# Patient Record
Sex: MALE | URBAN - METROPOLITAN AREA
[De-identification: names, ages, dates, MRNs, and addresses within clinical notes are randomized per-mention and may not be internally consistent; named-entity substitution may affect disease eponyms.]

---

## 2017-09-14 ENCOUNTER — HOSPITAL ENCOUNTER (OUTPATIENT)
Dept: SURGERY | Age: 72
Discharge: HOME | End: 2017-09-14
Attending: OPHTHALMOLOGY | Admitting: OPHTHALMOLOGY

## 2017-09-14 VITALS
WEIGHT: 225 LBS | RESPIRATION RATE: 12 BRPM | DIASTOLIC BLOOD PRESSURE: 69 MMHG | SYSTOLIC BLOOD PRESSURE: 147 MMHG | TEMPERATURE: 97.4 F | HEIGHT: 73 IN | HEART RATE: 63 BPM | OXYGEN SATURATION: 94 % | BODY MASS INDEX: 29.82 KG/M2

## 2017-09-14 RX ORDER — SODIUM CHLORIDE 0.9 % (FLUSH) 0.9 %
10 SYRINGE (ML) INJECTION EVERY 12 HOURS SCHEDULED
Status: DISCONTINUED | OUTPATIENT
Start: 2017-09-14 | End: 2017-09-15 | Stop reason: HOSPADM

## 2017-09-14 RX ORDER — SODIUM CHLORIDE 0.9 % (FLUSH) 0.9 %
10 SYRINGE (ML) INJECTION PRN
Status: DISCONTINUED | OUTPATIENT
Start: 2017-09-14 | End: 2017-09-14 | Stop reason: SDUPTHER

## 2017-09-14 RX ORDER — SODIUM CHLORIDE 0.9 % (FLUSH) 0.9 %
10 SYRINGE (ML) INJECTION PRN
Status: DISCONTINUED | OUTPATIENT
Start: 2017-09-14 | End: 2017-09-15 | Stop reason: HOSPADM

## 2017-09-14 RX ORDER — FENTANYL CITRATE 50 UG/ML
25 INJECTION, SOLUTION INTRAMUSCULAR; INTRAVENOUS EVERY 5 MIN PRN
Status: DISCONTINUED | OUTPATIENT
Start: 2017-09-14 | End: 2017-09-15 | Stop reason: HOSPADM

## 2017-09-14 RX ORDER — SODIUM CHLORIDE 9 MG/ML
INJECTION, SOLUTION INTRAVENOUS CONTINUOUS
Status: DISCONTINUED | OUTPATIENT
Start: 2017-09-14 | End: 2017-09-15 | Stop reason: HOSPADM

## 2017-09-14 RX ORDER — ONDANSETRON 2 MG/ML
4 INJECTION INTRAMUSCULAR; INTRAVENOUS
Status: ACTIVE | OUTPATIENT
Start: 2017-09-14 | End: 2017-09-14

## 2017-09-14 RX ORDER — SODIUM CHLORIDE 0.9 % (FLUSH) 0.9 %
10 SYRINGE (ML) INJECTION EVERY 12 HOURS SCHEDULED
Status: DISCONTINUED | OUTPATIENT
Start: 2017-09-14 | End: 2017-09-14 | Stop reason: SDUPTHER

## 2017-09-14 RX ADMIN — SODIUM CHLORIDE: 9 INJECTION, SOLUTION INTRAVENOUS at 08:01

## 2017-09-14 ASSESSMENT — PAIN - FUNCTIONAL ASSESSMENT: PAIN_FUNCTIONAL_ASSESSMENT: 0-10

## 2017-09-14 ASSESSMENT — PAIN SCALES - GENERAL
PAINLEVEL_OUTOF10: 0
PAINLEVEL_OUTOF10: 0

## 2017-09-14 ASSESSMENT — ENCOUNTER SYMPTOMS: SHORTNESS OF BREATH: 0

## 2024-01-06 ENCOUNTER — APPOINTMENT (OUTPATIENT)
Dept: GENERAL RADIOLOGY | Age: 79
End: 2024-01-06
Payer: MEDICARE

## 2024-01-06 ENCOUNTER — APPOINTMENT (OUTPATIENT)
Dept: CT IMAGING | Age: 79
End: 2024-01-06
Payer: MEDICARE

## 2024-01-06 ENCOUNTER — HOSPITAL ENCOUNTER (EMERGENCY)
Age: 79
Discharge: ANOTHER HOSPITAL | End: 2024-01-06
Attending: EMERGENCY MEDICINE
Payer: MEDICARE

## 2024-01-06 VITALS
RESPIRATION RATE: 16 BRPM | TEMPERATURE: 97.8 F | HEART RATE: 71 BPM | OXYGEN SATURATION: 94 % | HEIGHT: 73 IN | SYSTOLIC BLOOD PRESSURE: 152 MMHG | WEIGHT: 192 LBS | DIASTOLIC BLOOD PRESSURE: 72 MMHG | BODY MASS INDEX: 25.45 KG/M2

## 2024-01-06 DIAGNOSIS — S06.5XAA SUBDURAL HEMATOMA (HCC): ICD-10-CM

## 2024-01-06 DIAGNOSIS — I60.9 SAH (SUBARACHNOID HEMORRHAGE) (HCC): Primary | ICD-10-CM

## 2024-01-06 LAB
ALBUMIN SERPL-MCNC: 3.7 G/DL (ref 3.4–5)
ALBUMIN/GLOB SERPL: 1.3 {RATIO} (ref 1.1–2.2)
ALP SERPL-CCNC: 175 U/L (ref 40–129)
ALT SERPL-CCNC: 25 U/L (ref 10–40)
ANION GAP SERPL CALCULATED.3IONS-SCNC: 11 MMOL/L (ref 3–16)
AST SERPL-CCNC: 26 U/L (ref 15–37)
BASOPHILS # BLD: 0.1 K/UL (ref 0–0.2)
BASOPHILS NFR BLD: 0.6 %
BILIRUB SERPL-MCNC: 0.4 MG/DL (ref 0–1)
BUN SERPL-MCNC: 23 MG/DL (ref 7–20)
CALCIUM SERPL-MCNC: 9 MG/DL (ref 8.3–10.6)
CHLORIDE SERPL-SCNC: 95 MMOL/L (ref 99–110)
CO2 SERPL-SCNC: 28 MMOL/L (ref 21–32)
CREAT SERPL-MCNC: 0.9 MG/DL (ref 0.8–1.3)
DEPRECATED RDW RBC AUTO: 13.7 % (ref 12.4–15.4)
EOSINOPHIL # BLD: 0.1 K/UL (ref 0–0.6)
EOSINOPHIL NFR BLD: 1.4 %
GFR SERPLBLD CREATININE-BSD FMLA CKD-EPI: >60 ML/MIN/{1.73_M2}
GLUCOSE SERPL-MCNC: 115 MG/DL (ref 70–99)
HCT VFR BLD AUTO: 37 % (ref 40.5–52.5)
HGB BLD-MCNC: 12.7 G/DL (ref 13.5–17.5)
INR PPP: 0.99 (ref 0.84–1.16)
LYMPHOCYTES # BLD: 1.3 K/UL (ref 1–5.1)
LYMPHOCYTES NFR BLD: 16.3 %
MCH RBC QN AUTO: 31.7 PG (ref 26–34)
MCHC RBC AUTO-ENTMCNC: 34.2 G/DL (ref 31–36)
MCV RBC AUTO: 92.6 FL (ref 80–100)
MONOCYTES # BLD: 0.7 K/UL (ref 0–1.3)
MONOCYTES NFR BLD: 8.4 %
NEUTROPHILS # BLD: 6 K/UL (ref 1.7–7.7)
NEUTROPHILS NFR BLD: 73.3 %
PLATELET # BLD AUTO: 197 K/UL (ref 135–450)
PMV BLD AUTO: 8.1 FL (ref 5–10.5)
POTASSIUM SERPL-SCNC: 3.7 MMOL/L (ref 3.5–5.1)
PROT SERPL-MCNC: 6.6 G/DL (ref 6.4–8.2)
PROTHROMBIN TIME: 13.1 SEC (ref 11.5–14.8)
RBC # BLD AUTO: 4 M/UL (ref 4.2–5.9)
SODIUM SERPL-SCNC: 134 MMOL/L (ref 136–145)
WBC # BLD AUTO: 8.1 K/UL (ref 4–11)

## 2024-01-06 PROCEDURE — 70450 CT HEAD/BRAIN W/O DYE: CPT

## 2024-01-06 PROCEDURE — 80053 COMPREHEN METABOLIC PANEL: CPT

## 2024-01-06 PROCEDURE — 73090 X-RAY EXAM OF FOREARM: CPT

## 2024-01-06 PROCEDURE — 85610 PROTHROMBIN TIME: CPT

## 2024-01-06 PROCEDURE — 73502 X-RAY EXAM HIP UNI 2-3 VIEWS: CPT

## 2024-01-06 PROCEDURE — 73060 X-RAY EXAM OF HUMERUS: CPT

## 2024-01-06 PROCEDURE — 85025 COMPLETE CBC W/AUTO DIFF WBC: CPT

## 2024-01-06 PROCEDURE — 72125 CT NECK SPINE W/O DYE: CPT

## 2024-01-06 PROCEDURE — 99285 EMERGENCY DEPT VISIT HI MDM: CPT

## 2024-01-06 RX ORDER — ACETAMINOPHEN 325 MG/1
650 TABLET ORAL ONCE
Status: DISCONTINUED | OUTPATIENT
Start: 2024-01-06 | End: 2024-01-07 | Stop reason: HOSPADM

## 2024-01-06 ASSESSMENT — PAIN SCALES - GENERAL: PAINLEVEL_OUTOF10: 3

## 2024-01-06 ASSESSMENT — PAIN - FUNCTIONAL ASSESSMENT
PAIN_FUNCTIONAL_ASSESSMENT: PREVENTS OR INTERFERES WITH ALL ACTIVE AND SOME PASSIVE ACTIVITIES
PAIN_FUNCTIONAL_ASSESSMENT: 0-10

## 2024-01-06 ASSESSMENT — PAIN DESCRIPTION - LOCATION: LOCATION: SHOULDER

## 2024-01-06 ASSESSMENT — LIFESTYLE VARIABLES
HOW MANY STANDARD DRINKS CONTAINING ALCOHOL DO YOU HAVE ON A TYPICAL DAY: PATIENT DOES NOT DRINK
HOW OFTEN DO YOU HAVE A DRINK CONTAINING ALCOHOL: NEVER

## 2024-01-06 ASSESSMENT — PAIN DESCRIPTION - ORIENTATION: ORIENTATION: RIGHT;LEFT

## 2024-01-06 ASSESSMENT — PAIN DESCRIPTION - PAIN TYPE: TYPE: ACUTE PAIN
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
## 2024-01-07 NOTE — ED NOTES
ED SBAR report called to [facility] for ED to ED transport for neurosurgery. All questions answered at this time.

## 2024-01-07 NOTE — ED PROVIDER NOTES
rebound.  Pelvis stable and nontender.  Musculoskeletal: Flaccid right upper extremity hemiparesis.  No tenderness in the right upper extremity.  He does have full passive range of motion.  There is mild discomfort with range of motion in both shoulders but states that he does have some chronic pain in the shoulders.  No associated chest pain.  All other extremities non-tender with full range of motion.  Radial and dorsalis pedis pulses were equal laterally.  No calf tenderness erythema or edema.  Neurological: Alert and oriented x 3.  GCS 15.  No dysarthria.  No aphasia.  Left arm reveals no drift.  Able lift both lower extremities off the bed without difficulty.  Flaccid right upper extremity hemiparesis.  Patient speech clear. No acute focal motor or sensory deficits.  Skin: Skin is warm and dry. No rash.   Psychiatric: Normal mood and affect. Behavior is normal.     DIAGNOSTIC RESULTS     EKG: All EKG's are interpreted by the Emergency Department Physician who either signs or Co-signs this chart in the absence of a cardiologist.        RADIOLOGY:   Non-plain film images such as CT, Ultrasound and MRI are read by the radiologist. Plain radiographic images are visualized and preliminarily interpreted by the emergency physician with the below findings:        Interpretation per the Radiologist below, if available at the time of this note:    XR RADIUS ULNA RIGHT (2 VIEWS)   Final Result   1. No acute fracture or dislocation of the bilateral humeri or forearms are   identified.   2. Chronic fracture deformity of the proximal right humeral diaphysis.   3. 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         XR RADIUS ULNA LEFT (2 VIEWS)   Final Result   1. No acute fracture or dislocation of the bilateral humeri or forearms are   identified.   2. Chronic fracture deformity of the proximal right humeral diaphysis.   3. 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         XR HUMERUS RIGHT (MIN 2 VIEWS)   Final

## 2024-01-07 NOTE — ED NOTES
Patient has had trending SaO2 around 90% on RA.  Patient placed on O2 via NC at 2 lpm at this time.

## 2024-01-07 NOTE — ED NOTES
Patient transferred to  for Neurosurgery services not provided at this hospital.  Report to EMS crew who accepts care of patient for transport.  All questions answered and no concerns at this time.

## 2024-01-07 NOTE — ED NOTES
Shift handoff report given to  Adrian HERNANDEZ . VSS and call light within reach. All questions answered at this time.

## 2024-01-26 ENCOUNTER — HOSPITAL ENCOUNTER (INPATIENT)
Age: 79
DRG: 949 | End: 2024-01-26
Attending: PHYSICAL MEDICINE & REHABILITATION | Admitting: PHYSICAL MEDICINE & REHABILITATION
Payer: MEDICARE

## 2024-01-26 PROBLEM — I60.9 SUBARACHNOID HEMORRHAGE (HCC): Status: ACTIVE | Noted: 2024-01-26

## 2024-01-26 PROCEDURE — 94760 N-INVAS EAR/PLS OXIMETRY 1: CPT

## 2024-01-26 PROCEDURE — 51798 US URINE CAPACITY MEASURE: CPT

## 2024-01-26 PROCEDURE — 6370000000 HC RX 637 (ALT 250 FOR IP): Performed by: PHYSICAL MEDICINE & REHABILITATION

## 2024-01-26 PROCEDURE — 6360000002 HC RX W HCPCS: Performed by: PHYSICAL MEDICINE & REHABILITATION

## 2024-01-26 PROCEDURE — 1280000000 HC REHAB R&B

## 2024-01-26 PROCEDURE — 51701 INSERT BLADDER CATHETER: CPT

## 2024-01-26 RX ORDER — ATORVASTATIN CALCIUM 10 MG/1
10 TABLET, FILM COATED ORAL NIGHTLY
Status: DISPENSED | OUTPATIENT
Start: 2024-01-27

## 2024-01-26 RX ORDER — PHENYTOIN SODIUM 100 MG/1
100 CAPSULE, EXTENDED RELEASE ORAL 3 TIMES DAILY
Status: DISPENSED | OUTPATIENT
Start: 2024-01-26

## 2024-01-26 RX ORDER — ENOXAPARIN SODIUM 100 MG/ML
80 INJECTION SUBCUTANEOUS 2 TIMES DAILY
Status: DISPENSED | OUTPATIENT
Start: 2024-01-26

## 2024-01-26 RX ORDER — CARVEDILOL 6.25 MG/1
6.25 TABLET ORAL 2 TIMES DAILY WITH MEALS
Status: DISCONTINUED | OUTPATIENT
Start: 2024-01-26 | End: 2024-01-31

## 2024-01-26 RX ORDER — PANTOPRAZOLE SODIUM 40 MG/1
40 TABLET, DELAYED RELEASE ORAL
Status: DISCONTINUED | OUTPATIENT
Start: 2024-01-26 | End: 2024-02-01

## 2024-01-26 RX ORDER — OXYCODONE HCL 10 MG/1
10 TABLET, FILM COATED, EXTENDED RELEASE ORAL EVERY 12 HOURS SCHEDULED
Status: DISPENSED | OUTPATIENT
Start: 2024-01-26

## 2024-01-26 RX ORDER — BACLOFEN 10 MG/1
10 TABLET ORAL 3 TIMES DAILY
Status: DISCONTINUED | OUTPATIENT
Start: 2024-01-26 | End: 2024-01-28

## 2024-01-26 RX ORDER — OXYCODONE HYDROCHLORIDE 5 MG/1
5 TABLET ORAL EVERY 8 HOURS PRN
Status: DISCONTINUED | OUTPATIENT
Start: 2024-01-26 | End: 2024-01-30

## 2024-01-26 RX ORDER — BISACODYL 10 MG
10 SUPPOSITORY, RECTAL RECTAL DAILY PRN
Status: DISPENSED | OUTPATIENT
Start: 2024-01-26

## 2024-01-26 RX ORDER — ONDANSETRON 4 MG/1
4 TABLET, FILM COATED ORAL EVERY 8 HOURS PRN
Status: DISPENSED | OUTPATIENT
Start: 2024-01-26

## 2024-01-26 RX ORDER — LAMOTRIGINE 100 MG/1
200 TABLET ORAL 2 TIMES DAILY
Status: DISPENSED | OUTPATIENT
Start: 2024-01-26

## 2024-01-26 RX ORDER — HYDRALAZINE HYDROCHLORIDE 10 MG/1
10 TABLET, FILM COATED ORAL EVERY 6 HOURS PRN
Status: ACTIVE | OUTPATIENT
Start: 2024-01-26

## 2024-01-26 RX ORDER — LANOLIN ALCOHOL/MO/W.PET/CERES
3 CREAM (GRAM) TOPICAL NIGHTLY PRN
Status: DISPENSED | OUTPATIENT
Start: 2024-01-26

## 2024-01-26 RX ORDER — ACETAMINOPHEN 325 MG/1
650 TABLET ORAL EVERY 6 HOURS PRN
Status: DISPENSED | OUTPATIENT
Start: 2024-01-26

## 2024-01-26 RX ORDER — TAMSULOSIN HYDROCHLORIDE 0.4 MG/1
0.4 CAPSULE ORAL NIGHTLY
Status: DISPENSED | OUTPATIENT
Start: 2024-01-26

## 2024-01-26 RX ORDER — SENNA AND DOCUSATE SODIUM 50; 8.6 MG/1; MG/1
1 TABLET, FILM COATED ORAL 2 TIMES DAILY
Status: DISPENSED | OUTPATIENT
Start: 2024-01-26

## 2024-01-26 RX ORDER — POLYETHYLENE GLYCOL 3350 17 G/17G
17 POWDER, FOR SOLUTION ORAL DAILY PRN
Status: DISPENSED | OUTPATIENT
Start: 2024-01-26

## 2024-01-26 RX ORDER — CARVEDILOL 6.25 MG/1
6.25 TABLET ORAL 2 TIMES DAILY WITH MEALS
Status: ON HOLD | COMMUNITY

## 2024-01-26 RX ADMIN — PHENYTOIN SODIUM 100 MG: 100 CAPSULE ORAL at 21:12

## 2024-01-26 RX ADMIN — CARVEDILOL 6.25 MG: 6.25 TABLET, FILM COATED ORAL at 17:04

## 2024-01-26 RX ADMIN — LAMOTRIGINE 200 MG: 100 TABLET ORAL at 21:11

## 2024-01-26 RX ADMIN — BACLOFEN 10 MG: 10 TABLET ORAL at 15:17

## 2024-01-26 RX ADMIN — SENNOSIDES AND DOCUSATE SODIUM 1 TABLET: 8.6; 5 TABLET ORAL at 21:17

## 2024-01-26 RX ADMIN — SENNOSIDES AND DOCUSATE SODIUM 1 TABLET: 8.6; 5 TABLET ORAL at 17:04

## 2024-01-26 RX ADMIN — OXYCODONE HYDROCHLORIDE 10 MG: 10 TABLET, FILM COATED, EXTENDED RELEASE ORAL at 21:13

## 2024-01-26 RX ADMIN — TAMSULOSIN HYDROCHLORIDE 0.4 MG: 0.4 CAPSULE ORAL at 21:16

## 2024-01-26 RX ADMIN — BACLOFEN 10 MG: 10 TABLET ORAL at 21:11

## 2024-01-26 RX ADMIN — PANTOPRAZOLE SODIUM 40 MG: 40 TABLET, DELAYED RELEASE ORAL at 15:18

## 2024-01-26 RX ADMIN — ENOXAPARIN SODIUM 80 MG: 100 INJECTION SUBCUTANEOUS at 21:12

## 2024-01-26 RX ADMIN — PHENYTOIN SODIUM 100 MG: 100 CAPSULE ORAL at 15:18

## 2024-01-26 ASSESSMENT — PAIN DESCRIPTION - DESCRIPTORS
DESCRIPTORS: DISCOMFORT
DESCRIPTORS: DISCOMFORT
DESCRIPTORS: ACHING

## 2024-01-26 ASSESSMENT — PAIN DESCRIPTION - LOCATION
LOCATION: GENERALIZED

## 2024-01-26 ASSESSMENT — PAIN DESCRIPTION - FREQUENCY
FREQUENCY: CONTINUOUS
FREQUENCY: CONTINUOUS

## 2024-01-26 ASSESSMENT — PAIN DESCRIPTION - ONSET
ONSET: ON-GOING
ONSET: ON-GOING

## 2024-01-26 ASSESSMENT — PAIN DESCRIPTION - ORIENTATION
ORIENTATION: RIGHT;LEFT
ORIENTATION: RIGHT;LEFT

## 2024-01-26 ASSESSMENT — PAIN SCALES - GENERAL
PAINLEVEL_OUTOF10: 3
PAINLEVEL_OUTOF10: 4
PAINLEVEL_OUTOF10: 3
PAINLEVEL_OUTOF10: 0
PAINLEVEL_OUTOF10: 3

## 2024-01-26 ASSESSMENT — PAIN DESCRIPTION - PAIN TYPE
TYPE: CHRONIC PAIN

## 2024-01-26 ASSESSMENT — PAIN - FUNCTIONAL ASSESSMENT
PAIN_FUNCTIONAL_ASSESSMENT: ACTIVITIES ARE NOT PREVENTED
PAIN_FUNCTIONAL_ASSESSMENT: ACTIVITIES ARE NOT PREVENTED

## 2024-01-26 NOTE — PROGRESS NOTES
A complete drug regimen review was completed for this patient.     [x]  No clinically significant medication issue was identified    []   Yes, a clinically significant medication issue was identified     []  Adverse Drug Event:      []  Allergy:      []  Side Effect:      []  Ineffective Therapy:      []  Drug Interaction:     []  Duplicated Therapy:     []  Untreated Indication:      []  Non-adherence:     []  Other:          Electronically signed by Linda Bird RN on 1/26/24 at 3:26 PM EST

## 2024-01-26 NOTE — CONSULTS
Comprehensive Nutrition Assessment    Type and Reason for Visit:  Consult    Nutrition Recommendations/Plan:   Continue current diet.  Continue ONS.     Malnutrition Assessment:  Malnutrition Status:  Severe malnutrition (01/26/24 1506)    Context:  Chronic Illness       Nutrition Assessment:    RD consult for IP rehab. Pt. admitted for subarachnoid hemorrhage. Currently receiving a regular diet. Meal intakes TBD. Ensure offered BID. Pt. reports not caring for ensure but is agreeable to try magic cup. Pt. was concerned with CBW as he last remembers comfortably weighing 195#. Today he presents at 176#. Discussed the importance of good nutrition including protein and water intake for success in therapy. All orders updated. Will track weight progression and diet acceptance.    Nutrition Related Findings:    Nutrition related labs reviewed. LBM PTA. Wound Type: None       Current Nutrition Intake & Therapies:    Average Meal Intake: Unable to assess  Average Supplements Intake: Unable to assess  ADULT DIET; Regular  ADULT ORAL NUTRITION SUPPLEMENT; Dinner, Lunch; Frozen Oral Supplement    Anthropometric Measures:  Height:    Ideal Body Weight (IBW):   ( )       Current Body Weight: 80.1 kg (176 lb 9.4 oz),   IBW.    Current BMI (kg/m2):                            BMI Categories: Overweight (BMI 25.0-29.9)    Estimated Daily Nutrient Needs:  Energy Requirements Based On: Kcal/kg  Weight Used for Energy Requirements: Current  Energy (kcal/day): 2000-2400kcal (25-30kcal/kg)  Weight Used for Protein Requirements: Current  Protein (g/day): 80-96g (1-1.2g/kg)  Method Used for Fluid Requirements: 1 ml/kcal  Fluid (ml/day): TBD    Nutrition Diagnosis:   Unintended weight loss related to inadequate protein-energy intake as evidenced by poor intake prior to admission, weight loss greater than or equal to 5% in 1 month    Nutrition Interventions:   Food and/or Nutrient Delivery: Continue Current Diet, Start Oral Nutrition  Supplement  Nutrition Education/Counseling:  (Monitor need)  Coordination of Nutrition Care: Continue to monitor while inpatient       Goals:     Goals: Meet at least 75% of estimated needs       Nutrition Monitoring and Evaluation:   Behavioral-Environmental Outcomes: None Identified  Food/Nutrient Intake Outcomes: Food and Nutrient Intake  Physical Signs/Symptoms Outcomes: Biochemical Data, GI Status    Discharge Planning:    Too soon to determine     Odalis Agarwal RD  Contact: [phone]

## 2024-01-26 NOTE — PROGRESS NOTES
ADMISSION ORIENTATION    Eric Ovalles  MEDICAL RECORD NUMBER:  [id number]  AGE: 78 y.o.   GENDER: male  : 1945  TODAYS DATE:  2024      Room Orientation     Patient admitted to room 3263 per [] Wheel Chair  [] Bed  [] Recliner  [x] Stretcher  [] Other: .     Transferred to:  [x] Bed  [] Recliner  [] Other: .     Patient Required moderate assistance with moving over to bed    Patient was oriented to:  [x] Call Light  [x] Room Phone  [x] TV  [x] Thermostat  [x] Bathroom  [x] Bed and Chair Alarms per Rehab Policy  [x] Closet  [x] White Board and it's Use on Rehab  [] Other:    Patient Verbalized Understanding: Yes  Family Present: Yes      Rehab Orientation     [x] 3 Hours of Therapy  through   [x] Focus and Specialty of Physical, Occupational, and Speech and Language Pathology  [x] Weekly Team Conference and Discharge Planning  [x] Roles of the Rehab Doctor, Consulting Doctors, Nursing, Dietary, and Social Work  [x] Everyday Clothing, including proper footwear, for Therapy  [x] Visiting Hours, Visitor Ages, and Visitors Sleeping Overnight in the Hospital  [x] Visitor Participation in Therapy  [x]  and Sundays on Rehab  [x] Introduction of Welcome Folder, including Rehab Expectations, Nurse Manager's Welcome Letter, Visitor's Guide, and Menu Service  [x] Planning Ahead and Ordering Meals  [x] Falls Contract [x] Signed, [] Copied, and [] Taped to Closet Door  [] Other:    Patient Verbalized Understanding: Yes  Family Present: Yes    Electronically signed by Linda Bird RN on 2024 at 3:24 PM

## 2024-01-26 NOTE — PLAN OF CARE
Problem: Discharge Planning  Goal: Discharge to home or other facility with appropriate resources  Outcome: Progressing  Flowsheets  Taken 1/26/2024 1718  Discharge to home or other facility with appropriate resources: Identify barriers to discharge with patient and caregiver  Taken 1/26/2024 1300  Discharge to home or other facility with appropriate resources:   Identify barriers to discharge with patient and caregiver   Identify discharge learning needs (meds, wound care, etc)     Problem: Safety - Adult  Goal: Free from fall injury  Outcome: Progressing  Flowsheets (Taken 1/26/2024 1718)  Free From Fall Injury: Instruct family/caregiver on patient safety     Problem: ABCDS Injury Assessment  Goal: Absence of physical injury  Outcome: Progressing  Flowsheets (Taken 1/26/2024 1718)  Absence of Physical Injury: Implement safety measures based on patient assessment     Problem: Pain  Goal: Verbalizes/displays adequate comfort level or baseline comfort level  Outcome: Progressing  Flowsheets (Taken 1/26/2024 1718)  Verbalizes/displays adequate comfort level or baseline comfort level:   Encourage patient to monitor pain and request assistance   Assess pain using appropriate pain scale   Administer analgesics based on type and severity of pain and evaluate response   Implement non-pharmacological measures as appropriate and evaluate response   Consider cultural and social influences on pain and pain management   Notify Licensed Independent Practitioner if interventions unsuccessful or patient reports new pain     Problem: Nutrition Deficit:  Goal: Optimize nutritional status  Outcome: Progressing  Flowsheets (Taken 1/26/2024 1718)  Nutrient intake appropriate for improving, restoring, or maintaining nutritional needs: Monitor oral intake, labs, and treatment plans
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
## 2024-01-26 NOTE — PROGRESS NOTES
Ethnicity  \"Are you of , /a, or Tanzanian origin?\"  Check all that apply:  [x] A.  No, not of , /a, or [place]anian Origin  [] B.  Yes, Comoran, Comoran American, Chicano/a  [] C.  Yes, Yemeni  [] D.  Yes, Twin  [] E.  Yes, another , , or Tanzanian origin  [] X.  Patient unable to respond    Race  \"What is your race?\"  Check all that apply:  [x] A.  White  [] B.  Black or   [] C.   or   [] D.     [] E.  Chinese  [] F.  Stateless  [] G. Algerian  [] H.  Ukrainian  [] I.  Togolese  [] J.  Other   [] K.    [] L.  Welsh or Shai  [] M.  Serbian  [] N.  Other   [] X.  Patient unable to respond    High Risk Drug Classes:  Use and Indication    Is taking: Check if the pt is taking any medications by pharmacological classification, not how it is used, in the following classes  Indication noted: If column 1 is checked, check if there is an indication noted for all meds in the drug class Is taking  (check all that apply) Indication noted (check all that apply)   Antipsychotic [] []   Anticoagulant [x] [x]   Antibiotic [] []   Opioid [x] [x]   Antiplatelet [] []   Hypoglycemic (including insulin) [] []   None of the above []     Special Treatments, Procedures, and Programs    Check all of the following treatments, procedures, and programs that apply on admission. On admission (check all that apply)   Cancer Treatments   A1. Chemotherapy []           A2. IV []           A3. Oral []           A10. Other []   B1. Radiation []   Respiratory Therapies   C1. Oxygen Therapy []           C2. Continuous [x]           C3. Intermittent []           C4. High-concentration []   D1. Suctioning []           D2. Scheduled []           D3. As needed []   E1. Tracheostomy Care []   F1. Invasive Mechanical Ventilator (ventilator or respirator) []   G1. Non-invasive Mechanical Ventilator []           G2. BiPAP []

## 2024-01-26 NOTE — PROGRESS NOTES
4 Eyes Skin Assessment     NAME:  Eric Ovalles  YOB: 1945  MEDICAL RECORD NUMBER:  [id number]    The patient is being assessed for  Admission    I agree that at least one RN has performed a thorough Head to Toe Skin Assessment on the patient. ALL assessment sites listed below have been assessed.      Areas assessed by both nurses:    Head, Face, Ears, Shoulders, Back, Chest, Arms, Elbows, Hands, Sacrum. Buttock, Coccyx, Ischium, Legs. Feet and Heels, and Under Medical Devices         Does the Patient have a Wound? No noted wound(s)       Jeremi Prevention initiated by RN: Yes  Wound Care Orders initiated by RN: No    Pressure Injury (Stage 3,4, Unstageable, DTI, NWPT, and Complex wounds) if present, place Wound referral order by RN under : No    New Ostomies, if present place, Ostomy referral order under : No     Nurse 1 eSignature: Electronically signed by Linda Bird RN on 1/26/24 at 5:56 PM EST    **SHARE this note so that the co-signing nurse can place an eSignature**    Nurse 2 eSignature: Electronically signed by Lidia Pederson RN on 1/26/24 at 6:21 PM EST

## 2024-01-27 LAB
ANION GAP SERPL CALCULATED.3IONS-SCNC: 8 MMOL/L (ref 3–16)
BASOPHILS # BLD: 0.1 K/UL (ref 0–0.2)
BASOPHILS NFR BLD: 1.3 %
BUN SERPL-MCNC: 8 MG/DL (ref 7–20)
CALCIUM SERPL-MCNC: 8 MG/DL (ref 8.3–10.6)
CHLORIDE SERPL-SCNC: 102 MMOL/L (ref 99–110)
CO2 SERPL-SCNC: 31 MMOL/L (ref 21–32)
CREAT SERPL-MCNC: 0.7 MG/DL (ref 0.8–1.3)
DEPRECATED RDW RBC AUTO: 13 % (ref 12.4–15.4)
EOSINOPHIL # BLD: 0.3 K/UL (ref 0–0.6)
EOSINOPHIL NFR BLD: 3.9 %
GFR SERPLBLD CREATININE-BSD FMLA CKD-EPI: >60 ML/MIN/{1.73_M2}
GLUCOSE SERPL-MCNC: 111 MG/DL (ref 70–99)
HCT VFR BLD AUTO: 28.2 % (ref 40.5–52.5)
HGB BLD-MCNC: 9.9 G/DL (ref 13.5–17.5)
LYMPHOCYTES # BLD: 1.3 K/UL (ref 1–5.1)
LYMPHOCYTES NFR BLD: 18.3 %
MAGNESIUM SERPL-MCNC: 1.7 MG/DL (ref 1.8–2.4)
MCH RBC QN AUTO: 31.8 PG (ref 26–34)
MCHC RBC AUTO-ENTMCNC: 35 G/DL (ref 31–36)
MCV RBC AUTO: 90.8 FL (ref 80–100)
MONOCYTES # BLD: 0.6 K/UL (ref 0–1.3)
MONOCYTES NFR BLD: 7.7 %
NEUTROPHILS # BLD: 5 K/UL (ref 1.7–7.7)
NEUTROPHILS NFR BLD: 68.8 %
PLATELET # BLD AUTO: 194 K/UL (ref 135–450)
PMV BLD AUTO: 7.4 FL (ref 5–10.5)
POTASSIUM SERPL-SCNC: 3.3 MMOL/L (ref 3.5–5.1)
PREALB SERPL-MCNC: 9.1 MG/DL (ref 20–40)
RBC # BLD AUTO: 3.11 M/UL (ref 4.2–5.9)
SODIUM SERPL-SCNC: 141 MMOL/L (ref 136–145)
WBC # BLD AUTO: 7.2 K/UL (ref 4–11)

## 2024-01-27 PROCEDURE — 2700000000 HC OXYGEN THERAPY PER DAY

## 2024-01-27 PROCEDURE — 97163 PT EVAL HIGH COMPLEX 45 MIN: CPT | Performed by: PHYSICAL THERAPIST

## 2024-01-27 PROCEDURE — 92523 SPEECH SOUND LANG COMPREHEN: CPT

## 2024-01-27 PROCEDURE — 97535 SELF CARE MNGMENT TRAINING: CPT

## 2024-01-27 PROCEDURE — 85025 COMPLETE CBC W/AUTO DIFF WBC: CPT

## 2024-01-27 PROCEDURE — 83735 ASSAY OF MAGNESIUM: CPT

## 2024-01-27 PROCEDURE — 51701 INSERT BLADDER CATHETER: CPT

## 2024-01-27 PROCEDURE — 97116 GAIT TRAINING THERAPY: CPT | Performed by: PHYSICAL THERAPIST

## 2024-01-27 PROCEDURE — 6360000002 HC RX W HCPCS: Performed by: PHYSICAL MEDICINE & REHABILITATION

## 2024-01-27 PROCEDURE — 80048 BASIC METABOLIC PNL TOTAL CA: CPT

## 2024-01-27 PROCEDURE — 51798 US URINE CAPACITY MEASURE: CPT

## 2024-01-27 PROCEDURE — 97530 THERAPEUTIC ACTIVITIES: CPT

## 2024-01-27 PROCEDURE — 84134 ASSAY OF PREALBUMIN: CPT

## 2024-01-27 PROCEDURE — 1280000000 HC REHAB R&B

## 2024-01-27 PROCEDURE — 94760 N-INVAS EAR/PLS OXIMETRY 1: CPT

## 2024-01-27 PROCEDURE — 97530 THERAPEUTIC ACTIVITIES: CPT | Performed by: PHYSICAL THERAPIST

## 2024-01-27 PROCEDURE — 6370000000 HC RX 637 (ALT 250 FOR IP): Performed by: PHYSICAL MEDICINE & REHABILITATION

## 2024-01-27 PROCEDURE — 97167 OT EVAL HIGH COMPLEX 60 MIN: CPT

## 2024-01-27 PROCEDURE — 36415 COLL VENOUS BLD VENIPUNCTURE: CPT

## 2024-01-27 PROCEDURE — 6370000000 HC RX 637 (ALT 250 FOR IP): Performed by: STUDENT IN AN ORGANIZED HEALTH CARE EDUCATION/TRAINING PROGRAM

## 2024-01-27 RX ORDER — LANOLIN ALCOHOL/MO/W.PET/CERES
400 CREAM (GRAM) TOPICAL
Status: DISPENSED | OUTPATIENT
Start: 2024-01-27

## 2024-01-27 RX ORDER — POTASSIUM CHLORIDE 20 MEQ/1
20 TABLET, EXTENDED RELEASE ORAL 2 TIMES DAILY
Status: DISCONTINUED | OUTPATIENT
Start: 2024-01-27 | End: 2024-01-29

## 2024-01-27 RX ADMIN — SENNOSIDES AND DOCUSATE SODIUM 1 TABLET: 8.6; 5 TABLET ORAL at 21:18

## 2024-01-27 RX ADMIN — OXYCODONE HYDROCHLORIDE 10 MG: 10 TABLET, FILM COATED, EXTENDED RELEASE ORAL at 21:17

## 2024-01-27 RX ADMIN — PANTOPRAZOLE SODIUM 40 MG: 40 TABLET, DELAYED RELEASE ORAL at 05:16

## 2024-01-27 RX ADMIN — TAMSULOSIN HYDROCHLORIDE 0.4 MG: 0.4 CAPSULE ORAL at 21:19

## 2024-01-27 RX ADMIN — ENOXAPARIN SODIUM 80 MG: 100 INJECTION SUBCUTANEOUS at 08:08

## 2024-01-27 RX ADMIN — POTASSIUM CHLORIDE 20 MEQ: 1500 TABLET, EXTENDED RELEASE ORAL at 21:18

## 2024-01-27 RX ADMIN — BACLOFEN 10 MG: 10 TABLET ORAL at 21:18

## 2024-01-27 RX ADMIN — PHENYTOIN SODIUM 100 MG: 100 CAPSULE ORAL at 08:07

## 2024-01-27 RX ADMIN — Medication 400 MG: at 15:50

## 2024-01-27 RX ADMIN — PANTOPRAZOLE SODIUM 40 MG: 40 TABLET, DELAYED RELEASE ORAL at 15:50

## 2024-01-27 RX ADMIN — LAMOTRIGINE 200 MG: 100 TABLET ORAL at 08:07

## 2024-01-27 RX ADMIN — BACLOFEN 10 MG: 10 TABLET ORAL at 13:32

## 2024-01-27 RX ADMIN — LAMOTRIGINE 200 MG: 100 TABLET ORAL at 21:18

## 2024-01-27 RX ADMIN — ATORVASTATIN CALCIUM 10 MG: 10 TABLET, FILM COATED ORAL at 21:18

## 2024-01-27 RX ADMIN — OXYCODONE HYDROCHLORIDE 5 MG: 5 TABLET ORAL at 13:09

## 2024-01-27 RX ADMIN — BACLOFEN 10 MG: 10 TABLET ORAL at 08:08

## 2024-01-27 RX ADMIN — CARVEDILOL 6.25 MG: 6.25 TABLET, FILM COATED ORAL at 08:07

## 2024-01-27 RX ADMIN — PHENYTOIN SODIUM 100 MG: 100 CAPSULE ORAL at 13:32

## 2024-01-27 RX ADMIN — SENNOSIDES AND DOCUSATE SODIUM 1 TABLET: 8.6; 5 TABLET ORAL at 08:08

## 2024-01-27 RX ADMIN — CARVEDILOL 6.25 MG: 6.25 TABLET, FILM COATED ORAL at 17:14

## 2024-01-27 RX ADMIN — POTASSIUM CHLORIDE 20 MEQ: 1500 TABLET, EXTENDED RELEASE ORAL at 15:50

## 2024-01-27 RX ADMIN — PHENYTOIN SODIUM 100 MG: 100 CAPSULE ORAL at 21:18

## 2024-01-27 RX ADMIN — OXYCODONE HYDROCHLORIDE 10 MG: 10 TABLET, FILM COATED, EXTENDED RELEASE ORAL at 08:07

## 2024-01-27 RX ADMIN — ENOXAPARIN SODIUM 80 MG: 100 INJECTION SUBCUTANEOUS at 21:19

## 2024-01-27 ASSESSMENT — PAIN SCALES - GENERAL
PAINLEVEL_OUTOF10: 5
PAINLEVEL_OUTOF10: 7
PAINLEVEL_OUTOF10: 8
PAINLEVEL_OUTOF10: 5

## 2024-01-27 ASSESSMENT — PAIN DESCRIPTION - LOCATION
LOCATION: HIP;GROIN
LOCATION: HIP
LOCATION: HIP;GROIN

## 2024-01-27 ASSESSMENT — PAIN DESCRIPTION - DESCRIPTORS
DESCRIPTORS: ACHING;SHARP

## 2024-01-27 ASSESSMENT — PAIN DESCRIPTION - FREQUENCY
FREQUENCY: INTERMITTENT
FREQUENCY: INTERMITTENT

## 2024-01-27 ASSESSMENT — PAIN DESCRIPTION - PAIN TYPE
TYPE: CHRONIC PAIN

## 2024-01-27 ASSESSMENT — PAIN DESCRIPTION - ORIENTATION
ORIENTATION: RIGHT

## 2024-01-27 ASSESSMENT — PAIN - FUNCTIONAL ASSESSMENT
PAIN_FUNCTIONAL_ASSESSMENT: PREVENTS OR INTERFERES SOME ACTIVE ACTIVITIES AND ADLS
PAIN_FUNCTIONAL_ASSESSMENT: ACTIVITIES ARE NOT PREVENTED
PAIN_FUNCTIONAL_ASSESSMENT: PREVENTS OR INTERFERES SOME ACTIVE ACTIVITIES AND ADLS

## 2024-01-27 ASSESSMENT — PAIN DESCRIPTION - ONSET
ONSET: ON-GOING
ONSET: ON-GOING
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
## 2024-01-27 NOTE — ACP (ADVANCE CARE PLANNING)
Advance Care Planning     Advance Care Planning Inpatient Note  [facility]    Today's Date: 1/27/2024  Unit: [facility]    Received request from IDT Member.  Upon review of chart and communication with care team, request Health Care Provider's clarification of patient's decision making capacity.. Patient was/were present in the room during visit.    Goals of ACP Conversation:  Discuss advance care planning documents    Health Care Decision Makers:       Primary Decision Maker: Juliana Ovalles - Spouse - [phone]    Secondary Decision Maker: JosrAdrian - Child - [phone]  Summary:  Documented Next of Kin, per patient report    Advance Care Planning Documents (Patient Wishes):  None     Assessment:  Patient awake, lying in bed.  Patient shows some confusion and memory issues but is clear and consistent about his choices of his wife Juliana as primary healthcare decision maker and his son Tom as the alternate. Patient has only one child, so that decision fits with next of kin.    Interventions:  Provided education on documents for clarity and greater understanding  Discussed and provided education on state decision maker hierarchy  Reviewed but did not complete ACP document    Care Preferences Communicated:   No    Outcomes/Plan:  ACP Discussion: Completed    Electronically signed by CLAY Griffith on 1/27/2024 at 5:49 PM

## 2024-01-27 NOTE — PROGRESS NOTES
78 y.o. male patient admitted to rehab with subarachnoid hemorrhage.  A/Ox4. Transfers with stedy x1. Mobility restrictions: R side flaccid, stedy x1 until eval. On reg select diet, tolerating well. Medications taken whole w/ thins. On lovenox for DVT prophylaxis.  Skin: reddened blanchable coccyx. Oxygen: 2 L NC. LDA: none. Has been cont of bowel and intermittent straight cath Q 6hr. LBM 1/25. Chair/bed alarms in use and call light in reach.

## 2024-01-27 NOTE — PLAN OF CARE
Problem: Discharge Planning  Goal: Discharge to home or other facility with appropriate resources  1/27/2024 1106 by Madeline Smallwood RN  Outcome: Progressing  Flowsheets (Taken 1/27/2024 0820)  Discharge to home or other facility with appropriate resources: Identify barriers to discharge with patient and caregiver  1/27/2024 0154 by Shalini Mae RN  Outcome: Progressing  Flowsheets (Taken 1/26/2024 1900)  Discharge to home or other facility with appropriate resources: Identify barriers to discharge with patient and caregiver     Problem: Safety - Adult  Goal: Free from fall injury  1/27/2024 1106 by Madeline Smallwood RN  Outcome: Progressing  1/27/2024 0154 by Shalini Mae RN  Outcome: Progressing  Note: Fall risk band on patient. Orange light on outside of room. Non skid footwear in place. Alarms used appropriately. Patient instructed to call and wait for staff before getting up. Rounding done to anticipate needs. Appropriate safety devices used for transfers.     Problem: ABCDS Injury Assessment  Goal: Absence of physical injury  1/27/2024 1106 by Madeline Smallwood RN  Outcome: Progressing  Flowsheets (Taken 1/27/2024 1104)  Absence of Physical Injury: Implement safety measures based on patient assessment  1/27/2024 0154 by Shalini Mae RN  Outcome: Progressing     Problem: Pain  Goal: Verbalizes/displays adequate comfort level or baseline comfort level  1/27/2024 1106 by Madeline Smallwood RN  Outcome: Progressing  1/27/2024 0154 by Shalini Mae RN  Outcome: Progressing     Problem: Nutrition Deficit:  Goal: Optimize nutritional status  1/27/2024 1106 by Madeline Smallwood RN  Outcome: Progressing  1/27/2024 0154 by Shalini Mae RN  Outcome: Progressing     Problem: Skin/Tissue Integrity  Goal: Absence of new skin breakdown  Description: 1.  Monitor for areas of redness and/or skin breakdown  2.  Assess vascular access sites hourly  3.  Every 4-6 hours minimum:  Change oxygen saturation probe site  4.  Every 4-6

## 2024-01-27 NOTE — PLAN OF CARE
Problem: Safety - Adult  Goal: Free from fall injury  1/27/2024 0154 by Shalini Mae RN  Outcome: Progressing  Note: Fall risk band on patient. Orange light on outside of room. Non skid footwear in place. Alarms used appropriately. Patient instructed to call and wait for staff before getting up. Rounding done to anticipate needs. Appropriate safety devices used for transfers.

## 2024-01-27 NOTE — PROGRESS NOTES
Bladder scanned pt at 1815. Scan showed 106 ml. Verified w/ second bladder scanner which showed 109 ml. Pt states that he does not want to be straight cathed at this time d/t low volume of urine noted in bladder. Agrees to be scanned again in 6 hours.

## 2024-01-28 ENCOUNTER — APPOINTMENT (OUTPATIENT)
Dept: GENERAL RADIOLOGY | Age: 79
DRG: 949 | End: 2024-01-28
Attending: PHYSICAL MEDICINE & REHABILITATION
Payer: MEDICARE

## 2024-01-28 PROCEDURE — 1280000000 HC REHAB R&B

## 2024-01-28 PROCEDURE — 94760 N-INVAS EAR/PLS OXIMETRY 1: CPT

## 2024-01-28 PROCEDURE — 51798 US URINE CAPACITY MEASURE: CPT

## 2024-01-28 PROCEDURE — 6370000000 HC RX 637 (ALT 250 FOR IP): Performed by: PHYSICAL MEDICINE & REHABILITATION

## 2024-01-28 PROCEDURE — 51701 INSERT BLADDER CATHETER: CPT

## 2024-01-28 PROCEDURE — 2700000000 HC OXYGEN THERAPY PER DAY

## 2024-01-28 PROCEDURE — 6360000002 HC RX W HCPCS: Performed by: PHYSICAL MEDICINE & REHABILITATION

## 2024-01-28 PROCEDURE — 6370000000 HC RX 637 (ALT 250 FOR IP): Performed by: STUDENT IN AN ORGANIZED HEALTH CARE EDUCATION/TRAINING PROGRAM

## 2024-01-28 PROCEDURE — 73502 X-RAY EXAM HIP UNI 2-3 VIEWS: CPT

## 2024-01-28 RX ORDER — BACLOFEN 10 MG/1
15 TABLET ORAL 3 TIMES DAILY
Status: DISCONTINUED | OUTPATIENT
Start: 2024-01-28 | End: 2024-01-31

## 2024-01-28 RX ADMIN — ATORVASTATIN CALCIUM 10 MG: 10 TABLET, FILM COATED ORAL at 21:42

## 2024-01-28 RX ADMIN — BACLOFEN 15 MG: 10 TABLET ORAL at 13:22

## 2024-01-28 RX ADMIN — BACLOFEN 15 MG: 10 TABLET ORAL at 21:41

## 2024-01-28 RX ADMIN — LAMOTRIGINE 200 MG: 100 TABLET ORAL at 08:16

## 2024-01-28 RX ADMIN — POTASSIUM CHLORIDE 20 MEQ: 1500 TABLET, EXTENDED RELEASE ORAL at 08:15

## 2024-01-28 RX ADMIN — POTASSIUM CHLORIDE 20 MEQ: 1500 TABLET, EXTENDED RELEASE ORAL at 21:45

## 2024-01-28 RX ADMIN — LAMOTRIGINE 200 MG: 100 TABLET ORAL at 21:45

## 2024-01-28 RX ADMIN — OXYCODONE HYDROCHLORIDE 5 MG: 5 TABLET ORAL at 16:19

## 2024-01-28 RX ADMIN — CARVEDILOL 6.25 MG: 6.25 TABLET, FILM COATED ORAL at 16:19

## 2024-01-28 RX ADMIN — PHENYTOIN SODIUM 100 MG: 100 CAPSULE ORAL at 21:45

## 2024-01-28 RX ADMIN — ACETAMINOPHEN 325MG 650 MG: 325 TABLET ORAL at 04:43

## 2024-01-28 RX ADMIN — ENOXAPARIN SODIUM 80 MG: 100 INJECTION SUBCUTANEOUS at 08:15

## 2024-01-28 RX ADMIN — PHENYTOIN SODIUM 100 MG: 100 CAPSULE ORAL at 08:15

## 2024-01-28 RX ADMIN — SENNOSIDES AND DOCUSATE SODIUM 1 TABLET: 8.6; 5 TABLET ORAL at 08:15

## 2024-01-28 RX ADMIN — OXYCODONE HYDROCHLORIDE 10 MG: 10 TABLET, FILM COATED, EXTENDED RELEASE ORAL at 21:43

## 2024-01-28 RX ADMIN — PHENYTOIN SODIUM 100 MG: 100 CAPSULE ORAL at 13:22

## 2024-01-28 RX ADMIN — OXYCODONE HYDROCHLORIDE 5 MG: 5 TABLET ORAL at 08:38

## 2024-01-28 RX ADMIN — TAMSULOSIN HYDROCHLORIDE 0.4 MG: 0.4 CAPSULE ORAL at 21:45

## 2024-01-28 RX ADMIN — PANTOPRAZOLE SODIUM 40 MG: 40 TABLET, DELAYED RELEASE ORAL at 16:19

## 2024-01-28 RX ADMIN — Medication 400 MG: at 08:16

## 2024-01-28 RX ADMIN — SENNOSIDES AND DOCUSATE SODIUM 1 TABLET: 8.6; 5 TABLET ORAL at 21:41

## 2024-01-28 RX ADMIN — BACLOFEN 10 MG: 10 TABLET ORAL at 08:16

## 2024-01-28 RX ADMIN — CARVEDILOL 6.25 MG: 6.25 TABLET, FILM COATED ORAL at 08:15

## 2024-01-28 RX ADMIN — ENOXAPARIN SODIUM 80 MG: 100 INJECTION SUBCUTANEOUS at 21:46

## 2024-01-28 RX ADMIN — OXYCODONE HYDROCHLORIDE 5 MG: 5 TABLET ORAL at 00:13

## 2024-01-28 RX ADMIN — PANTOPRAZOLE SODIUM 40 MG: 40 TABLET, DELAYED RELEASE ORAL at 05:21

## 2024-01-28 RX ADMIN — OXYCODONE HYDROCHLORIDE 10 MG: 10 TABLET, FILM COATED, EXTENDED RELEASE ORAL at 08:15

## 2024-01-28 ASSESSMENT — PAIN - FUNCTIONAL ASSESSMENT
PAIN_FUNCTIONAL_ASSESSMENT: PREVENTS OR INTERFERES WITH MANY ACTIVE NOT PASSIVE ACTIVITIES
PAIN_FUNCTIONAL_ASSESSMENT: PREVENTS OR INTERFERES SOME ACTIVE ACTIVITIES AND ADLS
PAIN_FUNCTIONAL_ASSESSMENT: PREVENTS OR INTERFERES SOME ACTIVE ACTIVITIES AND ADLS

## 2024-01-28 ASSESSMENT — PAIN DESCRIPTION - LOCATION
LOCATION: HIP
LOCATION: HIP;GROIN
LOCATION: HIP;GROIN

## 2024-01-28 ASSESSMENT — PAIN DESCRIPTION - PAIN TYPE
TYPE: CHRONIC PAIN

## 2024-01-28 ASSESSMENT — PAIN SCALES - GENERAL
PAINLEVEL_OUTOF10: 9
PAINLEVEL_OUTOF10: 3
PAINLEVEL_OUTOF10: 2
PAINLEVEL_OUTOF10: 6
PAINLEVEL_OUTOF10: 7
PAINLEVEL_OUTOF10: 9
PAINLEVEL_OUTOF10: 9
PAINLEVEL_OUTOF10: 7
PAINLEVEL_OUTOF10: 7

## 2024-01-28 ASSESSMENT — PAIN DESCRIPTION - DESCRIPTORS
DESCRIPTORS: SHARP
DESCRIPTORS: DULL;ACHING
DESCRIPTORS: SHARP

## 2024-01-28 ASSESSMENT — PAIN DESCRIPTION - ONSET
ONSET: ON-GOING
ONSET: ON-GOING

## 2024-01-28 ASSESSMENT — PAIN DESCRIPTION - FREQUENCY
FREQUENCY: INTERMITTENT
FREQUENCY: INTERMITTENT

## 2024-01-28 ASSESSMENT — PAIN DESCRIPTION - ORIENTATION
ORIENTATION: RIGHT

## 2024-01-28 NOTE — PLAN OF CARE
Problem: Safety - Adult  Goal: Free from fall injury  Outcome: Progressing  Note: Fall risk band on patient. Orange light on outside of room. Non skid footwear in place. Alarms used appropriately. Patient instructed to call and wait for staff before getting up. Rounding done to anticipate needs. Appropriate safety devices used for transfers.
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
## 2024-01-28 NOTE — PROGRESS NOTES
Bladder scanned at 1100. Showed 245 ml. Pt does not want to be straight cathed at this time. Is requesting to move to chair. Will rescan  pt in 2-3 hours.

## 2024-01-28 NOTE — PROGRESS NOTES
78 y.o. male patient admitted to rehab with subarachnoid hemorrhage.  A/Ox4. Transfers with stedy x1. Mobility restrictions: R side flaccid, stedy x1. Severe pain in R hip/ groin. Dr. Alvarado aware. X-ray ordered. On reg select diet, tolerating well. Medications taken whole w/ thins. On lovenox for DVT prophylaxis.  Skin: reddened blanchable coccyx. Oxygen: 1 L NC. LDA: none. Has been cont of bowel and intermittent straight cath Q 6hr. LBM 1/25. Chair/bed alarms in use and call light in reach.

## 2024-01-28 NOTE — PLAN OF CARE
Problem: Discharge Planning  Goal: Discharge to home or other facility with appropriate resources  1/28/2024 1021 by Madeline Smallwood RN  Outcome: Progressing  Flowsheets (Taken 1/28/2024 0845)  Discharge to home or other facility with appropriate resources: Identify barriers to discharge with patient and caregiver  1/28/2024 0319 by Shalini Mae RN  Outcome: Progressing     Problem: Safety - Adult  Goal: Free from fall injury  1/28/2024 1021 by Madeline Smallwood RN  Outcome: Progressing  Flowsheets (Taken 1/28/2024 1020)  Free From Fall Injury: Instruct family/caregiver on patient safety  1/28/2024 0319 by Shalini Mae RN  Outcome: Progressing  Note: Fall risk band on patient. Orange light on outside of room. Non skid footwear in place. Alarms used appropriately. Patient instructed to call and wait for staff before getting up. Rounding done to anticipate needs. Appropriate safety devices used for transfers.     Problem: ABCDS Injury Assessment  Goal: Absence of physical injury  1/28/2024 1021 by Madeline Smallwood RN  Outcome: Progressing  Flowsheets (Taken 1/28/2024 1020)  Absence of Physical Injury: Implement safety measures based on patient assessment  1/28/2024 0319 by Shalini Mae RN  Outcome: Progressing     Problem: Pain  Goal: Verbalizes/displays adequate comfort level or baseline comfort level  1/28/2024 1021 by Madeline Smallwood RN  Outcome: Progressing  1/28/2024 0319 by Shalini Mae RN  Outcome: Progressing     Problem: Nutrition Deficit:  Goal: Optimize nutritional status  1/28/2024 1021 by Madeline Smallwood RN  Outcome: Progressing  1/28/2024 0319 by Shalini Mae RN  Outcome: Progressing     Problem: Skin/Tissue Integrity  Goal: Absence of new skin breakdown  Description: 1.  Monitor for areas of redness and/or skin breakdown  2.  Assess vascular access sites hourly  3.  Every 4-6 hours minimum:  Change oxygen saturation probe site  4.  Every 4-6 hours:  If on nasal continuous positive airway pressure,

## 2024-01-29 LAB
ANION GAP SERPL CALCULATED.3IONS-SCNC: 7 MMOL/L (ref 3–16)
BASOPHILS # BLD: 0.1 K/UL (ref 0–0.2)
BASOPHILS NFR BLD: 1.1 %
BUN SERPL-MCNC: 10 MG/DL (ref 7–20)
CALCIUM SERPL-MCNC: 8.4 MG/DL (ref 8.3–10.6)
CHLORIDE SERPL-SCNC: 97 MMOL/L (ref 99–110)
CO2 SERPL-SCNC: 30 MMOL/L (ref 21–32)
CREAT SERPL-MCNC: 0.7 MG/DL (ref 0.8–1.3)
DEPRECATED RDW RBC AUTO: 13.4 % (ref 12.4–15.4)
EOSINOPHIL # BLD: 0.2 K/UL (ref 0–0.6)
EOSINOPHIL NFR BLD: 1.7 %
GFR SERPLBLD CREATININE-BSD FMLA CKD-EPI: >60 ML/MIN/{1.73_M2}
GLUCOSE SERPL-MCNC: 100 MG/DL (ref 70–99)
HCT VFR BLD AUTO: 24.6 % (ref 40.5–52.5)
HGB BLD-MCNC: 8.7 G/DL (ref 13.5–17.5)
LYMPHOCYTES # BLD: 1.4 K/UL (ref 1–5.1)
LYMPHOCYTES NFR BLD: 14.9 %
MCH RBC QN AUTO: 31.9 PG (ref 26–34)
MCHC RBC AUTO-ENTMCNC: 35.1 G/DL (ref 31–36)
MCV RBC AUTO: 90.9 FL (ref 80–100)
MONOCYTES # BLD: 0.8 K/UL (ref 0–1.3)
MONOCYTES NFR BLD: 8.5 %
NEUTROPHILS # BLD: 6.7 K/UL (ref 1.7–7.7)
NEUTROPHILS NFR BLD: 73.8 %
PLATELET # BLD AUTO: 227 K/UL (ref 135–450)
PMV BLD AUTO: 8.1 FL (ref 5–10.5)
POTASSIUM SERPL-SCNC: 4.2 MMOL/L (ref 3.5–5.1)
RBC # BLD AUTO: 2.71 M/UL (ref 4.2–5.9)
SODIUM SERPL-SCNC: 134 MMOL/L (ref 136–145)
WBC # BLD AUTO: 9.1 K/UL (ref 4–11)

## 2024-01-29 PROCEDURE — 97129 THER IVNTJ 1ST 15 MIN: CPT

## 2024-01-29 PROCEDURE — 1280000000 HC REHAB R&B

## 2024-01-29 PROCEDURE — 80048 BASIC METABOLIC PNL TOTAL CA: CPT

## 2024-01-29 PROCEDURE — 6370000000 HC RX 637 (ALT 250 FOR IP): Performed by: STUDENT IN AN ORGANIZED HEALTH CARE EDUCATION/TRAINING PROGRAM

## 2024-01-29 PROCEDURE — 97535 SELF CARE MNGMENT TRAINING: CPT

## 2024-01-29 PROCEDURE — 36415 COLL VENOUS BLD VENIPUNCTURE: CPT

## 2024-01-29 PROCEDURE — 6360000002 HC RX W HCPCS: Performed by: PHYSICAL MEDICINE & REHABILITATION

## 2024-01-29 PROCEDURE — 94760 N-INVAS EAR/PLS OXIMETRY 1: CPT

## 2024-01-29 PROCEDURE — 2700000000 HC OXYGEN THERAPY PER DAY

## 2024-01-29 PROCEDURE — 97542 WHEELCHAIR MNGMENT TRAINING: CPT

## 2024-01-29 PROCEDURE — 97130 THER IVNTJ EA ADDL 15 MIN: CPT

## 2024-01-29 PROCEDURE — 97530 THERAPEUTIC ACTIVITIES: CPT

## 2024-01-29 PROCEDURE — 85025 COMPLETE CBC W/AUTO DIFF WBC: CPT

## 2024-01-29 PROCEDURE — 51701 INSERT BLADDER CATHETER: CPT

## 2024-01-29 PROCEDURE — 51798 US URINE CAPACITY MEASURE: CPT

## 2024-01-29 PROCEDURE — 97110 THERAPEUTIC EXERCISES: CPT

## 2024-01-29 PROCEDURE — 6370000000 HC RX 637 (ALT 250 FOR IP): Performed by: PHYSICAL MEDICINE & REHABILITATION

## 2024-01-29 RX ORDER — POTASSIUM CHLORIDE 20 MEQ/1
20 TABLET, EXTENDED RELEASE ORAL DAILY
Status: DISCONTINUED | OUTPATIENT
Start: 2024-01-30 | End: 2024-02-01

## 2024-01-29 RX ADMIN — OXYCODONE HYDROCHLORIDE 10 MG: 10 TABLET, FILM COATED, EXTENDED RELEASE ORAL at 08:43

## 2024-01-29 RX ADMIN — OXYCODONE HYDROCHLORIDE 5 MG: 5 TABLET ORAL at 05:54

## 2024-01-29 RX ADMIN — LAMOTRIGINE 200 MG: 100 TABLET ORAL at 08:47

## 2024-01-29 RX ADMIN — SENNOSIDES AND DOCUSATE SODIUM 1 TABLET: 8.6; 5 TABLET ORAL at 08:47

## 2024-01-29 RX ADMIN — ENOXAPARIN SODIUM 80 MG: 100 INJECTION SUBCUTANEOUS at 21:30

## 2024-01-29 RX ADMIN — ENOXAPARIN SODIUM 80 MG: 100 INJECTION SUBCUTANEOUS at 08:47

## 2024-01-29 RX ADMIN — BACLOFEN 15 MG: 10 TABLET ORAL at 13:12

## 2024-01-29 RX ADMIN — POTASSIUM CHLORIDE 20 MEQ: 1500 TABLET, EXTENDED RELEASE ORAL at 08:47

## 2024-01-29 RX ADMIN — TAMSULOSIN HYDROCHLORIDE 0.4 MG: 0.4 CAPSULE ORAL at 21:31

## 2024-01-29 RX ADMIN — CARVEDILOL 6.25 MG: 6.25 TABLET, FILM COATED ORAL at 16:21

## 2024-01-29 RX ADMIN — ATORVASTATIN CALCIUM 10 MG: 10 TABLET, FILM COATED ORAL at 21:31

## 2024-01-29 RX ADMIN — PANTOPRAZOLE SODIUM 40 MG: 40 TABLET, DELAYED RELEASE ORAL at 16:16

## 2024-01-29 RX ADMIN — PHENYTOIN SODIUM 100 MG: 100 CAPSULE ORAL at 21:30

## 2024-01-29 RX ADMIN — PHENYTOIN SODIUM 100 MG: 100 CAPSULE ORAL at 13:12

## 2024-01-29 RX ADMIN — BACLOFEN 15 MG: 10 TABLET ORAL at 08:45

## 2024-01-29 RX ADMIN — Medication 400 MG: at 08:47

## 2024-01-29 RX ADMIN — ACETAMINOPHEN 325MG 650 MG: 325 TABLET ORAL at 00:49

## 2024-01-29 RX ADMIN — PHENYTOIN SODIUM 100 MG: 100 CAPSULE ORAL at 08:47

## 2024-01-29 RX ADMIN — ACETAMINOPHEN 325MG 650 MG: 325 TABLET ORAL at 16:16

## 2024-01-29 RX ADMIN — OXYCODONE HYDROCHLORIDE 5 MG: 5 TABLET ORAL at 22:32

## 2024-01-29 RX ADMIN — LAMOTRIGINE 200 MG: 100 TABLET ORAL at 21:31

## 2024-01-29 RX ADMIN — SENNOSIDES AND DOCUSATE SODIUM 1 TABLET: 8.6; 5 TABLET ORAL at 21:31

## 2024-01-29 RX ADMIN — DICLOFENAC SODIUM 4 G: 10 GEL TOPICAL at 21:31

## 2024-01-29 RX ADMIN — PANTOPRAZOLE SODIUM 40 MG: 40 TABLET, DELAYED RELEASE ORAL at 05:54

## 2024-01-29 RX ADMIN — OXYCODONE HYDROCHLORIDE 5 MG: 5 TABLET ORAL at 13:11

## 2024-01-29 RX ADMIN — BACLOFEN 15 MG: 10 TABLET ORAL at 21:30

## 2024-01-29 RX ADMIN — OXYCODONE HYDROCHLORIDE 10 MG: 10 TABLET, FILM COATED, EXTENDED RELEASE ORAL at 21:31

## 2024-01-29 ASSESSMENT — PAIN DESCRIPTION - ONSET
ONSET: ON-GOING

## 2024-01-29 ASSESSMENT — PAIN DESCRIPTION - DESCRIPTORS
DESCRIPTORS: ACHING
DESCRIPTORS: SHARP;DULL;ACHING
DESCRIPTORS: ACHING;DISCOMFORT;DULL;NAGGING
DESCRIPTORS: ACHING
DESCRIPTORS: ACHING;DISCOMFORT

## 2024-01-29 ASSESSMENT — PAIN DESCRIPTION - LOCATION
LOCATION: HIP
LOCATION: HIP
LOCATION: GROIN
LOCATION: GROIN
LOCATION: HIP
LOCATION: HIP
LOCATION: HIP;BACK

## 2024-01-29 ASSESSMENT — PAIN DESCRIPTION - ORIENTATION
ORIENTATION: RIGHT;LOWER
ORIENTATION: RIGHT

## 2024-01-29 ASSESSMENT — PAIN SCALES - GENERAL
PAINLEVEL_OUTOF10: 3
PAINLEVEL_OUTOF10: 8
PAINLEVEL_OUTOF10: 7
PAINLEVEL_OUTOF10: 8
PAINLEVEL_OUTOF10: 9
PAINLEVEL_OUTOF10: 3
PAINLEVEL_OUTOF10: 8
PAINLEVEL_OUTOF10: 8
PAINLEVEL_OUTOF10: 9
PAINLEVEL_OUTOF10: 5
PAINLEVEL_OUTOF10: 8
PAINLEVEL_OUTOF10: 5
PAINLEVEL_OUTOF10: 6

## 2024-01-29 ASSESSMENT — PAIN DESCRIPTION - PAIN TYPE
TYPE: CHRONIC PAIN

## 2024-01-29 ASSESSMENT — PAIN DESCRIPTION - FREQUENCY
FREQUENCY: INTERMITTENT

## 2024-01-29 ASSESSMENT — PAIN - FUNCTIONAL ASSESSMENT
PAIN_FUNCTIONAL_ASSESSMENT: ACTIVITIES ARE NOT PREVENTED
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
## 2024-01-29 NOTE — PLAN OF CARE
Problem: Discharge Planning  Goal: Discharge to home or other facility with appropriate resources  1/29/2024 1403 by Juliana Montano RN  Outcome: Progressing     Problem: Safety - Adult  Goal: Free from fall injury  1/29/2024 1403 by Juliana Montano RN  Outcome: Progressing     Problem: ABCDS Injury Assessment  Goal: Absence of physical injury  1/29/2024 1403 by Juliana Montano RN  Outcome: Progressing     Problem: Pain  Goal: Verbalizes/displays adequate comfort level or baseline comfort level  1/29/2024 1403 by Juliana Montano RN  Outcome: Progressing     Problem: Nutrition Deficit:  Goal: Optimize nutritional status  1/29/2024 1403 by Juliana Montano RN  Outcome: Progressing     Problem: Skin/Tissue Integrity  Goal: Absence of new skin breakdown  Description: 1.  Monitor for areas of redness and/or skin breakdown  2.  Assess vascular access sites hourly  3.  Every 4-6 hours minimum:  Change oxygen saturation probe site  4.  Every 4-6 hours:  If on nasal continuous positive airway pressure, respiratory therapy assess nares and determine need for appliance change or resting period.  1/29/2024 1403 by Juliana Montano RN  Outcome: Progressing

## 2024-01-29 NOTE — PLAN OF CARE
Problem: Safety - Adult  Goal: Free from fall injury  1/29/2024 0135 by Shalini Mae RN  Outcome: Progressing  Note: Fall risk band on patient. Orange light on outside of room. Non skid footwear in place. Alarms used appropriately. Patient instructed to call and wait for staff before getting up. Rounding done to anticipate needs. Appropriate safety devices used for transfers.
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
## 2024-01-29 NOTE — PROGRESS NOTES
Pt nauseated after pills were administered, emesis was yellow in color, thin liquid, one capsule was found intact, Coreg was held, pt nausea was resolved, and wentr to therapy. Will continue to monitor. Electronically signed by Juliana Montano RN on 1/29/2024 at 9:14 AM
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
## 2024-01-29 NOTE — PROGRESS NOTES
Department of Physical Medicine & Rehabilitation  Progress Note    Patient Identification:  Eric Ovalles  [id number]  : 1945  Admit date: 2024    Chief Complaint: Subarachnoid hemorrhage (HCC)    Subjective:   No acute events overnight.   Patient seen this afternoon sitting up in the gym. Reports ongoing right hip/groin pain. Worse with weight bearing. Increased baclofen mildly helpful. Wife updated.   Labs reviewed.     ROS: No f/c, n/v, cp     Objective:  Patient Vitals for the past 24 hrs:   BP Temp Temp src Pulse Resp SpO2   24 0859 111/66 -- -- 94 18 95 %   24 0845 (!) 90/59 97.9 °F (36.6 °C) Oral 92 18 94 %   24 0814 -- -- -- 84 16 94 %   24 2141 130/64 98.8 °F (37.1 °C) Oral 87 16 94 %   24 1649 -- -- -- -- 16 --   24 1619 132/64 -- -- 86 -- --   24 1615 132/64 98.1 °F (36.7 °C) Oral 86 16 --   24 1330 -- -- -- -- -- 97 %   24 1045 -- -- -- -- -- 98 %   24 0908 -- -- -- -- 16 --     Const: Alert. No distress, pleasant.   HEENT: Normocephalic, atraumatic. Normal sclera/conjunctiva. MMM.   CV: Regular rate and rhythm.   Resp: No respiratory distress. Lungs distant/diminished at bases   Abd: Soft, nontender, nondistended, NABS+   Ext: +RUE/RLE edema  MSK: right hip ROM limited by pain, weakness, and spasticity. Pain elicited with Passive hip flexion but not IR or ER. +TTP hip flexors.   Neuro: Alert, oriented, appropriately interactive. Mildly impaired recall.   Psych: Cooperative, appropriate mood and affect    Laboratory data: Available via EMR.   Last 24 hour lab  Recent Results (from the past 24 hour(s))   CBC with Auto Differential    Collection Time: 24  6:30 AM   Result Value Ref Range    WBC 9.1 4.0 - 11.0 K/uL    RBC 2.71 (L) 4.20 - 5.90 M/uL    Hemoglobin 8.7 (L) 13.5 - 17.5 g/dL    Hematocrit 24.6 (L) 40.5 - 52.5 %    MCV 90.9 80.0 - 100.0 fL    MCH 31.9 26.0 - 34.0 pg    MCHC 35.1 31.0 - 36.0 g/dL    RDW 13.4 12.4 -  and Lamictal     Bilateral lobar/segmental PEs, RLE DVT  -Continue therapeutic lovenox per Nsgy     Acute hypoxic respiratory failure  -As evidenced by O2 saturation <90% on room air  -Supplemental O2, wean as tolerated  -- down to 1L  -Treating PEs as above     BOB  -Due to urinary retention, improved with catheterization  -Avoid nephrotoxins, renally dose meds  -Monitor renal function  -continue ISC program as below     Esophagitis  -s/p EGD at  (1/23): LA Grade D esophagitis, hematin in gastric fundus, duodenitis  -s/p manometry study: ineffective esophageal motility  -f/u GI for biopsy results  -Plan for repeat EGD 8 weeks  -continue ppi BID     CAD, HTN  -s/p stents in 1990s  -continue atorvastatin, carvedilol  -ASA on hold until cleared by Nsgy    Severe protein calorie malnutrition  -Dietitian consult  -Supplements and education    Hypokalemia  -Improved with replacement, decrease supplement    Hypomagnesemia  -continue supplement, repeat level pending    R groin pain  -Suspect hip flexor strain based on exam.   -Xray R hip negative for fracture/dislocation.  -continue pain control as below, diclofenc gel, ice  -PT/OT     Chronic Pain   -continue Oxycontin, prn oxycodone  -continue baclofen (dose increased)  -started diclofenac gel     Neurogenic Bladder: chronic urinary retention on ISC program at baseline  -Intermittent straight cath scheduled Q6 hours  -continue Flomax  -consider resuming home oxybutynin if having symptoms     Neurogenic Bowel: constipation  -senna+colace BID, PRN miralax, MoM, and bisacodyl supp.     Safety   -fall and aspiration precautions     PPx  -DVT: therapeutic lovenox for PEs as above  -GI: pantoprazole    Rehab Progress: Making progress. Overall min-modA for mobility. Up to maxA for ADLs. Barriers include: pain, baseline right hemiparesis and sensory deficit, endurance, cognition.   Anticipated Dispo: home with spouse  Services: TBD  DME: TBD  ELOS: 2 weeks      Padmini GARCIA  MD Cynthia 1/29/2024, 9:07 AM

## 2024-01-30 PROCEDURE — 6370000000 HC RX 637 (ALT 250 FOR IP): Performed by: STUDENT IN AN ORGANIZED HEALTH CARE EDUCATION/TRAINING PROGRAM

## 2024-01-30 PROCEDURE — 6360000002 HC RX W HCPCS: Performed by: PHYSICAL MEDICINE & REHABILITATION

## 2024-01-30 PROCEDURE — 1280000000 HC REHAB R&B

## 2024-01-30 PROCEDURE — 97110 THERAPEUTIC EXERCISES: CPT

## 2024-01-30 PROCEDURE — 97129 THER IVNTJ 1ST 15 MIN: CPT

## 2024-01-30 PROCEDURE — 97130 THER IVNTJ EA ADDL 15 MIN: CPT

## 2024-01-30 PROCEDURE — 6370000000 HC RX 637 (ALT 250 FOR IP): Performed by: PHYSICAL MEDICINE & REHABILITATION

## 2024-01-30 PROCEDURE — 97530 THERAPEUTIC ACTIVITIES: CPT

## 2024-01-30 PROCEDURE — 97535 SELF CARE MNGMENT TRAINING: CPT

## 2024-01-30 PROCEDURE — 97116 GAIT TRAINING THERAPY: CPT

## 2024-01-30 PROCEDURE — 94760 N-INVAS EAR/PLS OXIMETRY 1: CPT

## 2024-01-30 PROCEDURE — 51701 INSERT BLADDER CATHETER: CPT

## 2024-01-30 RX ORDER — OXYCODONE HYDROCHLORIDE 5 MG/1
5 TABLET ORAL EVERY 6 HOURS PRN
Status: DISCONTINUED | OUTPATIENT
Start: 2024-01-30 | End: 2024-01-30 | Stop reason: SDUPTHER

## 2024-01-30 RX ORDER — OXYCODONE HYDROCHLORIDE 5 MG/1
5 TABLET ORAL ONCE
Status: COMPLETED | OUTPATIENT
Start: 2024-01-30 | End: 2024-01-30

## 2024-01-30 RX ORDER — OXYCODONE HYDROCHLORIDE 10 MG/1
10 TABLET ORAL EVERY 6 HOURS PRN
Status: DISCONTINUED | OUTPATIENT
Start: 2024-01-30 | End: 2024-01-30 | Stop reason: SDUPTHER

## 2024-01-30 RX ORDER — OXYCODONE HYDROCHLORIDE 10 MG/1
10 TABLET ORAL EVERY 6 HOURS PRN
Status: DISPENSED | OUTPATIENT
Start: 2024-01-30

## 2024-01-30 RX ORDER — OXYCODONE HYDROCHLORIDE 5 MG/1
5 TABLET ORAL EVERY 6 HOURS PRN
Status: DISPENSED | OUTPATIENT
Start: 2024-01-30

## 2024-01-30 RX ORDER — ACETAMINOPHEN 500 MG
1000 TABLET ORAL EVERY 8 HOURS SCHEDULED
Status: DISPENSED | OUTPATIENT
Start: 2024-01-30

## 2024-01-30 RX ADMIN — TAMSULOSIN HYDROCHLORIDE 0.4 MG: 0.4 CAPSULE ORAL at 22:26

## 2024-01-30 RX ADMIN — OXYCODONE HYDROCHLORIDE 10 MG: 10 TABLET, FILM COATED, EXTENDED RELEASE ORAL at 08:13

## 2024-01-30 RX ADMIN — BACLOFEN 15 MG: 10 TABLET ORAL at 14:47

## 2024-01-30 RX ADMIN — ACETAMINOPHEN 1000 MG: 500 TABLET ORAL at 22:26

## 2024-01-30 RX ADMIN — OXYCODONE HYDROCHLORIDE 10 MG: 10 TABLET ORAL at 12:02

## 2024-01-30 RX ADMIN — BACLOFEN 15 MG: 10 TABLET ORAL at 08:12

## 2024-01-30 RX ADMIN — PHENYTOIN SODIUM 100 MG: 100 CAPSULE ORAL at 14:47

## 2024-01-30 RX ADMIN — POLYETHYLENE GLYCOL 3350 17 G: 17 POWDER, FOR SOLUTION ORAL at 12:02

## 2024-01-30 RX ADMIN — ACETAMINOPHEN 1000 MG: 500 TABLET ORAL at 14:47

## 2024-01-30 RX ADMIN — PANTOPRAZOLE SODIUM 40 MG: 40 TABLET, DELAYED RELEASE ORAL at 08:12

## 2024-01-30 RX ADMIN — OXYCODONE HYDROCHLORIDE 5 MG: 5 TABLET ORAL at 01:24

## 2024-01-30 RX ADMIN — CARVEDILOL 6.25 MG: 6.25 TABLET, FILM COATED ORAL at 08:13

## 2024-01-30 RX ADMIN — PHENYTOIN SODIUM 100 MG: 100 CAPSULE ORAL at 22:26

## 2024-01-30 RX ADMIN — ENOXAPARIN SODIUM 80 MG: 100 INJECTION SUBCUTANEOUS at 08:12

## 2024-01-30 RX ADMIN — LAMOTRIGINE 200 MG: 100 TABLET ORAL at 22:26

## 2024-01-30 RX ADMIN — ATORVASTATIN CALCIUM 10 MG: 10 TABLET, FILM COATED ORAL at 22:26

## 2024-01-30 RX ADMIN — Medication 400 MG: at 08:12

## 2024-01-30 RX ADMIN — DICLOFENAC SODIUM 4 G: 10 GEL TOPICAL at 22:31

## 2024-01-30 RX ADMIN — PHENYTOIN SODIUM 100 MG: 100 CAPSULE ORAL at 08:13

## 2024-01-30 RX ADMIN — CARVEDILOL 6.25 MG: 6.25 TABLET, FILM COATED ORAL at 17:24

## 2024-01-30 RX ADMIN — POTASSIUM CHLORIDE 20 MEQ: 20 TABLET, EXTENDED RELEASE ORAL at 08:18

## 2024-01-30 RX ADMIN — BACLOFEN 15 MG: 10 TABLET ORAL at 22:26

## 2024-01-30 RX ADMIN — PANTOPRAZOLE SODIUM 40 MG: 40 TABLET, DELAYED RELEASE ORAL at 17:25

## 2024-01-30 RX ADMIN — Medication 3 MG: at 00:32

## 2024-01-30 RX ADMIN — OXYCODONE HYDROCHLORIDE 10 MG: 10 TABLET ORAL at 19:15

## 2024-01-30 RX ADMIN — OXYCODONE HYDROCHLORIDE 10 MG: 10 TABLET, FILM COATED, EXTENDED RELEASE ORAL at 22:34

## 2024-01-30 RX ADMIN — DICLOFENAC SODIUM 4 G: 10 GEL TOPICAL at 08:19

## 2024-01-30 RX ADMIN — DICLOFENAC SODIUM 4 G: 10 GEL TOPICAL at 12:04

## 2024-01-30 RX ADMIN — LAMOTRIGINE 200 MG: 100 TABLET ORAL at 08:12

## 2024-01-30 RX ADMIN — DICLOFENAC SODIUM 4 G: 10 GEL TOPICAL at 17:25

## 2024-01-30 RX ADMIN — SENNOSIDES AND DOCUSATE SODIUM 1 TABLET: 8.6; 5 TABLET ORAL at 22:26

## 2024-01-30 RX ADMIN — SENNOSIDES AND DOCUSATE SODIUM 1 TABLET: 8.6; 5 TABLET ORAL at 08:12

## 2024-01-30 RX ADMIN — ENOXAPARIN SODIUM 80 MG: 100 INJECTION SUBCUTANEOUS at 22:26

## 2024-01-30 ASSESSMENT — PAIN DESCRIPTION - LOCATION
LOCATION: GROIN
LOCATION: GROIN;LEG
LOCATION: GROIN

## 2024-01-30 ASSESSMENT — PAIN - FUNCTIONAL ASSESSMENT
PAIN_FUNCTIONAL_ASSESSMENT: ACTIVITIES ARE NOT PREVENTED

## 2024-01-30 ASSESSMENT — PAIN SCALES - GENERAL
PAINLEVEL_OUTOF10: 7
PAINLEVEL_OUTOF10: 0
PAINLEVEL_OUTOF10: 7
PAINLEVEL_OUTOF10: 9
PAINLEVEL_OUTOF10: 4
PAINLEVEL_OUTOF10: 6
PAINLEVEL_OUTOF10: 0
PAINLEVEL_OUTOF10: 6
PAINLEVEL_OUTOF10: 0
PAINLEVEL_OUTOF10: 6
PAINLEVEL_OUTOF10: 8

## 2024-01-30 ASSESSMENT — PAIN DESCRIPTION - DESCRIPTORS
DESCRIPTORS: ACHING
DESCRIPTORS: ACHING;SHARP
DESCRIPTORS: ACHING;DISCOMFORT

## 2024-01-30 ASSESSMENT — PAIN DESCRIPTION - ORIENTATION
ORIENTATION: RIGHT

## 2024-01-30 NOTE — PROGRESS NOTES
Pt continues to moan and cry out in pain. Attempted to reposition to L side and pt could not tolerate that position. Pt back onto R side. Ice applied to R groin.
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
## 2024-01-30 NOTE — PROGRESS NOTES
Pt still awake. Medicated with Melatonin per prn orders. Str cathed for 250 cc nikko urine at this time. Pt tolerated fairly well.
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
## 2024-01-30 NOTE — PROGRESS NOTES
Pt continues to have uncontrolled R groin pain, 8/9 out of 10 on pain scale. Placed call to Dr. Marie regarding.  New order received for Roxicodone 5mg x1 dose. Pt updated.
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
## 2024-01-30 NOTE — PLAN OF CARE
Problem: Safety - Adult  Goal: Free from fall injury  Outcome: Progressing  Flowsheets (Taken 1/28/2024 1020 by Madeline Smallwood, RN)  Free From Fall Injury: Instruct family/caregiver on patient safety     Problem: ABCDS Injury Assessment  Goal: Absence of physical injury  Outcome: Progressing  Flowsheets (Taken 1/28/2024 1020 by Madeline Smallwood, RN)  Absence of Physical Injury: Implement safety measures based on patient assessment

## 2024-01-30 NOTE — PROGRESS NOTES
Pt returned to bed. Still c/o R groin pain. Denies having a hernia. No redness, warmth or swelling noted. Positive femoral and pedal pulses noted in RLE. Skin color and temp WDL on RLE.
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
## 2024-01-30 NOTE — PROGRESS NOTES
Pt requesting to sit up in chair. Pt up from lying to sitting with mod A. R arm is flaccid. Up from sit to stand with GB and stedy x2. Pt leans hunched over stedy. Pt to chair. Refused to have legs elevated. Medicated with Debbie 5 mg per prn orders.

## 2024-01-30 NOTE — PROGRESS NOTES
Patient admitted to rehab with subarachnoid hemorrhage.  A/Ox 4. Transfers with stedy x 1 - 2. Mobility restrictions: none. On regular diet, tolerating well. Medications taken whole. On Lovenox for DVT prophylaxis.  Skin: scattered bruising; blanchable redness to coccyx. Oxygen: none. LDA: none. Has been continent of bowel and straight catheter every six hours for bladder. LBM 1/26. Chair/bed alarms in use and call light in reach. Will monitor for safety.
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
## 2024-01-30 NOTE — PROGRESS NOTES
Patient admitted to rehab with Subarachnoid Hemorrhage.  A/Ox4. Transfers with stedy and gait belt x1. Mobility restrictions: right hip pain, right side flaccid. On Regular diet, tolerating well. Medications taken whole with thin liquids. On Lovenox for DVT prophylaxis.  Skin: Reddened coccyx, blanchable . Oxygen: RA, weaned off Oxygen during the day. LDA: none. Has been continent of bowel and bladder. LBM 1/25/2024, and refusing Miralax, taking senokot. Chair/bed alarms in use and call light in reach. Will monitor for safety. Electronically signed by Juliana Montano RN on 1/29/2024 at 7:26 PM

## 2024-01-31 ENCOUNTER — APPOINTMENT (OUTPATIENT)
Dept: GENERAL RADIOLOGY | Age: 79
DRG: 949 | End: 2024-01-31
Attending: PHYSICAL MEDICINE & REHABILITATION
Payer: MEDICARE

## 2024-01-31 PROCEDURE — 1280000000 HC REHAB R&B

## 2024-01-31 PROCEDURE — 74018 RADEX ABDOMEN 1 VIEW: CPT

## 2024-01-31 PROCEDURE — 6360000002 HC RX W HCPCS: Performed by: PHYSICAL MEDICINE & REHABILITATION

## 2024-01-31 PROCEDURE — 97129 THER IVNTJ 1ST 15 MIN: CPT

## 2024-01-31 PROCEDURE — 6370000000 HC RX 637 (ALT 250 FOR IP): Performed by: STUDENT IN AN ORGANIZED HEALTH CARE EDUCATION/TRAINING PROGRAM

## 2024-01-31 PROCEDURE — 51701 INSERT BLADDER CATHETER: CPT

## 2024-01-31 PROCEDURE — 94760 N-INVAS EAR/PLS OXIMETRY 1: CPT

## 2024-01-31 PROCEDURE — 97130 THER IVNTJ EA ADDL 15 MIN: CPT

## 2024-01-31 PROCEDURE — 6370000000 HC RX 637 (ALT 250 FOR IP): Performed by: PHYSICAL MEDICINE & REHABILITATION

## 2024-01-31 RX ORDER — BACLOFEN 10 MG/1
10 TABLET ORAL 3 TIMES DAILY
Status: DISPENSED | OUTPATIENT
Start: 2024-01-31

## 2024-01-31 RX ORDER — CARVEDILOL 3.12 MG/1
3.12 TABLET ORAL 2 TIMES DAILY WITH MEALS
Status: DISPENSED | OUTPATIENT
Start: 2024-01-31

## 2024-01-31 RX ADMIN — ENOXAPARIN SODIUM 80 MG: 100 INJECTION SUBCUTANEOUS at 09:31

## 2024-01-31 RX ADMIN — PANTOPRAZOLE SODIUM 40 MG: 40 TABLET, DELAYED RELEASE ORAL at 05:18

## 2024-01-31 RX ADMIN — OXYCODONE HYDROCHLORIDE 10 MG: 10 TABLET ORAL at 13:34

## 2024-01-31 RX ADMIN — ATORVASTATIN CALCIUM 10 MG: 10 TABLET, FILM COATED ORAL at 21:51

## 2024-01-31 RX ADMIN — DICLOFENAC SODIUM 4 G: 10 GEL TOPICAL at 07:53

## 2024-01-31 RX ADMIN — SENNOSIDES AND DOCUSATE SODIUM 1 TABLET: 8.6; 5 TABLET ORAL at 07:39

## 2024-01-31 RX ADMIN — ACETAMINOPHEN 1000 MG: 500 TABLET ORAL at 21:53

## 2024-01-31 RX ADMIN — TAMSULOSIN HYDROCHLORIDE 0.4 MG: 0.4 CAPSULE ORAL at 21:59

## 2024-01-31 RX ADMIN — OXYCODONE HYDROCHLORIDE 10 MG: 10 TABLET, FILM COATED, EXTENDED RELEASE ORAL at 07:41

## 2024-01-31 RX ADMIN — BACLOFEN 15 MG: 10 TABLET ORAL at 07:39

## 2024-01-31 RX ADMIN — LAMOTRIGINE 200 MG: 100 TABLET ORAL at 07:41

## 2024-01-31 RX ADMIN — DICLOFENAC SODIUM 4 G: 10 GEL TOPICAL at 21:55

## 2024-01-31 RX ADMIN — PHENYTOIN SODIUM 100 MG: 100 CAPSULE ORAL at 21:52

## 2024-01-31 RX ADMIN — OXYCODONE HYDROCHLORIDE 10 MG: 10 TABLET, FILM COATED, EXTENDED RELEASE ORAL at 21:52

## 2024-01-31 RX ADMIN — PHENYTOIN SODIUM 100 MG: 100 CAPSULE ORAL at 07:38

## 2024-01-31 RX ADMIN — DICLOFENAC SODIUM 4 G: 10 GEL TOPICAL at 13:36

## 2024-01-31 RX ADMIN — SENNOSIDES AND DOCUSATE SODIUM 1 TABLET: 8.6; 5 TABLET ORAL at 21:52

## 2024-01-31 RX ADMIN — BACLOFEN 10 MG: 10 TABLET ORAL at 21:51

## 2024-01-31 RX ADMIN — ENOXAPARIN SODIUM 80 MG: 100 INJECTION SUBCUTANEOUS at 21:52

## 2024-01-31 RX ADMIN — PHENYTOIN SODIUM 100 MG: 100 CAPSULE ORAL at 15:55

## 2024-01-31 RX ADMIN — LAMOTRIGINE 200 MG: 100 TABLET ORAL at 21:52

## 2024-01-31 RX ADMIN — ACETAMINOPHEN 1000 MG: 500 TABLET ORAL at 05:18

## 2024-01-31 RX ADMIN — PANTOPRAZOLE SODIUM 40 MG: 40 TABLET, DELAYED RELEASE ORAL at 15:53

## 2024-01-31 RX ADMIN — POLYETHYLENE GLYCOL 3350 17 G: 17 POWDER, FOR SOLUTION ORAL at 14:12

## 2024-01-31 RX ADMIN — BACLOFEN 10 MG: 10 TABLET ORAL at 15:53

## 2024-01-31 RX ADMIN — ACETAMINOPHEN 1000 MG: 500 TABLET ORAL at 15:55

## 2024-01-31 RX ADMIN — MAGNESIUM HYDROXIDE 30 ML: 400 SUSPENSION ORAL at 14:12

## 2024-01-31 ASSESSMENT — PAIN DESCRIPTION - ONSET
ONSET: ON-GOING

## 2024-01-31 ASSESSMENT — PAIN SCALES - GENERAL
PAINLEVEL_OUTOF10: 8
PAINLEVEL_OUTOF10: 6
PAINLEVEL_OUTOF10: 6
PAINLEVEL_OUTOF10: 0
PAINLEVEL_OUTOF10: 5
PAINLEVEL_OUTOF10: 5
PAINLEVEL_OUTOF10: 6
PAINLEVEL_OUTOF10: 7
PAINLEVEL_OUTOF10: 7
PAINLEVEL_OUTOF10: 5

## 2024-01-31 ASSESSMENT — PAIN DESCRIPTION - ORIENTATION
ORIENTATION: RIGHT

## 2024-01-31 ASSESSMENT — PAIN DESCRIPTION - DESCRIPTORS
DESCRIPTORS: ACHING;SHARP
DESCRIPTORS: ACHING
DESCRIPTORS: ACHING;DISCOMFORT;SHARP
DESCRIPTORS: ACHING;DISCOMFORT
DESCRIPTORS: ACHING

## 2024-01-31 ASSESSMENT — PAIN - FUNCTIONAL ASSESSMENT
PAIN_FUNCTIONAL_ASSESSMENT: ACTIVITIES ARE NOT PREVENTED

## 2024-01-31 ASSESSMENT — PAIN DESCRIPTION - FREQUENCY
FREQUENCY: INTERMITTENT

## 2024-01-31 ASSESSMENT — PAIN DESCRIPTION - LOCATION
LOCATION: ABDOMEN;HIP
LOCATION: LEG;GROIN
LOCATION: ABDOMEN;HIP
LOCATION: GROIN
LOCATION: RIB CAGE

## 2024-01-31 ASSESSMENT — PAIN DESCRIPTION - PAIN TYPE
TYPE: CHRONIC PAIN

## 2024-01-31 NOTE — PROGRESS NOTES
Pt awake and sitting up in recliner. Caitlyn x1-2. Pt appeared to sleep well last night, however this morning pt stated he didn't sleep at all. Pt rated pain this morning a 6 of 10, scheduled Tylenol given. Pt refused prn pain med as he wants to take it closer to therapy time. Chair alarm on and call light in reach
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
## 2024-01-31 NOTE — PROGRESS NOTES
Occupational Therapy  Eric Ovalles  Pt met at bedside, in bed with nursing providing medication.  Pt on hold today due to need for bowel medication, vomiting earlier.  Will cont tomorrow as medically able  Pamela Schwab, OTR/L  #[id number] 1/31/24 2:32 PM  Variance 45 min

## 2024-01-31 NOTE — PROGRESS NOTES
Patient admitted to rehab with Subarachnoid Hemorrhage.  A/Ox4. Transfers with stedy and gait belt x1. Mobility restrictions: right hip pain, right side flaccid. On Regular diet, barely tolerating. Medications taken whole with thin liquids. On Lovenox for DVT prophylaxis.  Skin: Reddened coccyx, blanchable . Oxygen: RA, weaned off Oxygen during the day. LDA: none. Has been continent of bowel and bladder. LBM 1/25/2024, and refusing Miralax, taking senokot. Chair/bed alarms in use and call light in reach. Will monitor for safety. Electronically signed by Juliana Montano RN on 1/31/2024 at 5:37 PM

## 2024-01-31 NOTE — PROGRESS NOTES
Physical Therapy  Eric Ovalles  PT notified that patient is actively vomiting. Will defer PT at this time. Will check in PM.     Update @ 1337 : Patient continues vomiting at this time. Due to patient's medical status, PT will remain on HOLD this date. Will re-attempt as patient is medically stable to participate with PT.     Thank you.  Harmony Evans PT, DPT, [id number]

## 2024-01-31 NOTE — ACP (ADVANCE CARE PLANNING)
Advance Care Planning     Advance Care Planning Inpatient Note  Spiritual Care Department    Today's Date: 1/31/2024  Unit: [facility]    Received request from patient and family.  Upon review of chart and communication with care team, patient's decision making abilities are not in question.. Patient was/were present in the room during visit.    Goals of ACP Conversation:  Discuss advance care planning documents  Facilitate a discussion related to patient's goals of care as they align with the patient's values and beliefs.    Health Care Decision Makers:       Primary Decision Maker: JosrJuliana - Spouse - [phone]    Secondary Decision Maker: Adrian Ovalles - Child - [phone]  Summary:  Completed New Documents    Advance Care Planning Documents (Patient Wishes):  Healthcare Power of /Advance Directive Appointment of Health Care Agent  Living Will/Advance Directive     Assessment:  Patient is alert & oriented. Patient expressed desire to complete HCPOA & LW documents.  facilitated completion of HCPOA & LW documents per patient wishes. Pt's wife also completed her HCPOA & LW documents. Verified that ACP/Healthcare POA documents are present, complete, & witnessed as form requires. Verified accuracy of Healthcare Decision Maker(s) contact information. Ensured Decision Makers are designated Primary, Secondary or Supplemental.  All patient's questioned answered. Patient encouraged to call Spiritual Care Services at [facility] if questions arise.     Interventions:  Provided education on documents for clarity and greater understanding  Discussed and provided education on state decision maker hierarchy  Assisted in the completion of documents according to patient's wishes at this time  Encouraged ongoing ACP conversation with future decision makers and loved ones    Care Preferences Communicated:   No    Outcomes/Plan:  New advance directive completed.  Returned original document(s) to  patient, as well as copies for distribution to appointed agents  Copy of advance directive given to staff to scan into medical record.  Teach Back Method used to verify the patient's and/or Healthcare Decision Maker's understanding of key information in the advance directive documents    Electronically signed by Chaplain Alfa on 1/31/2024 at 3:10 PM

## 2024-01-31 NOTE — PROGRESS NOTES
Pt vomited breakfast, pills not yet taken, will allow time to recover. Will continue to monitor. Electronically signed by Juliana Montano RN on 1/31/2024 at 7:57 AM

## 2024-01-31 NOTE — PLAN OF CARE
Problem: Discharge Planning  Goal: Discharge to home or other facility with appropriate resources  Outcome: Progressing     Problem: Safety - Adult  Goal: Free from fall injury  Outcome: Progressing     Problem: ABCDS Injury Assessment  Goal: Absence of physical injury  Outcome: Progressing     Problem: Pain  Goal: Verbalizes/displays adequate comfort level or baseline comfort level  Outcome: Progressing     Problem: Nutrition Deficit:  Goal: Optimize nutritional status  Outcome: Progressing     Problem: Skin/Tissue Integrity  Goal: Absence of new skin breakdown  Description: 1.  Monitor for areas of redness and/or skin breakdown  2.  Assess vascular access sites hourly  3.  Every 4-6 hours minimum:  Change oxygen saturation probe site  4.  Every 4-6 hours:  If on nasal continuous positive airway pressure, respiratory therapy assess nares and determine need for appliance change or resting period.  Outcome: Progressing

## 2024-01-31 NOTE — PROGRESS NOTES
Perfect Serve in to Dr. Marie:    pt vomiting x3 this morning, posibly in need of a KUB?  LBM 1/25/2024, taking senokot but vomiting after breakfast and after  pill administration. therapy on hold d/t actively vomiting now.    Awaiting reply. Electronically signed by Juliana Montano RN on 1/31/2024 at 9:42 AM

## 2024-01-31 NOTE — PROGRESS NOTES
Occupational Therapy      Eric IRVIN Josr  [id number]  [id number]    RN noting that patient is actively vomiting. Will defer OT at this time. Will check in pm.     Electronically signed by FRANCESCA De La Vega on 1/31/2024 at 9:51 AM    OTR was consulted with this patients treatment/intervention plan.     VARIANCE: 45 minutes

## 2024-02-01 LAB
ALBUMIN SERPL-MCNC: 2.7 G/DL (ref 3.4–5)
ALP SERPL-CCNC: 146 U/L (ref 40–129)
ALT SERPL-CCNC: 23 U/L (ref 10–40)
ANION GAP SERPL CALCULATED.3IONS-SCNC: 10 MMOL/L (ref 3–16)
AST SERPL-CCNC: 34 U/L (ref 15–37)
BASOPHILS # BLD: 0.1 K/UL (ref 0–0.2)
BASOPHILS NFR BLD: 0.9 %
BILIRUB DIRECT SERPL-MCNC: 0.3 MG/DL (ref 0–0.3)
BILIRUB INDIRECT SERPL-MCNC: 0.3 MG/DL (ref 0–1)
BILIRUB SERPL-MCNC: 0.6 MG/DL (ref 0–1)
BUN SERPL-MCNC: 20 MG/DL (ref 7–20)
CALCIUM SERPL-MCNC: 8.4 MG/DL (ref 8.3–10.6)
CHLORIDE SERPL-SCNC: 96 MMOL/L (ref 99–110)
CO2 SERPL-SCNC: 28 MMOL/L (ref 21–32)
CREAT SERPL-MCNC: 0.8 MG/DL (ref 0.8–1.3)
DEPRECATED RDW RBC AUTO: 13.7 % (ref 12.4–15.4)
EOSINOPHIL # BLD: 0.2 K/UL (ref 0–0.6)
EOSINOPHIL NFR BLD: 2.5 %
GFR SERPLBLD CREATININE-BSD FMLA CKD-EPI: >60 ML/MIN/{1.73_M2}
GLUCOSE BLD-MCNC: 99 MG/DL (ref 70–99)
GLUCOSE SERPL-MCNC: 92 MG/DL (ref 70–99)
HCT VFR BLD AUTO: 23.1 % (ref 40.5–52.5)
HGB BLD-MCNC: 8 G/DL (ref 13.5–17.5)
LIPASE SERPL-CCNC: 17 U/L (ref 13–60)
LYMPHOCYTES # BLD: 1.4 K/UL (ref 1–5.1)
LYMPHOCYTES NFR BLD: 15.6 %
MCH RBC QN AUTO: 31.6 PG (ref 26–34)
MCHC RBC AUTO-ENTMCNC: 34.8 G/DL (ref 31–36)
MCV RBC AUTO: 91 FL (ref 80–100)
MONOCYTES # BLD: 0.8 K/UL (ref 0–1.3)
MONOCYTES NFR BLD: 8.5 %
NEUTROPHILS # BLD: 6.7 K/UL (ref 1.7–7.7)
NEUTROPHILS NFR BLD: 72.5 %
PERFORMED ON: NORMAL
PLATELET # BLD AUTO: 312 K/UL (ref 135–450)
PMV BLD AUTO: 7.6 FL (ref 5–10.5)
POTASSIUM SERPL-SCNC: 4.7 MMOL/L (ref 3.5–5.1)
PROT SERPL-MCNC: 6.2 G/DL (ref 6.4–8.2)
RBC # BLD AUTO: 2.54 M/UL (ref 4.2–5.9)
SODIUM SERPL-SCNC: 134 MMOL/L (ref 136–145)
WBC # BLD AUTO: 9.2 K/UL (ref 4–11)

## 2024-02-01 PROCEDURE — 6370000000 HC RX 637 (ALT 250 FOR IP): Performed by: STUDENT IN AN ORGANIZED HEALTH CARE EDUCATION/TRAINING PROGRAM

## 2024-02-01 PROCEDURE — 97542 WHEELCHAIR MNGMENT TRAINING: CPT

## 2024-02-01 PROCEDURE — 6360000002 HC RX W HCPCS: Performed by: PHYSICAL MEDICINE & REHABILITATION

## 2024-02-01 PROCEDURE — 97110 THERAPEUTIC EXERCISES: CPT

## 2024-02-01 PROCEDURE — 97130 THER IVNTJ EA ADDL 15 MIN: CPT

## 2024-02-01 PROCEDURE — 80076 HEPATIC FUNCTION PANEL: CPT

## 2024-02-01 PROCEDURE — 80048 BASIC METABOLIC PNL TOTAL CA: CPT

## 2024-02-01 PROCEDURE — 97535 SELF CARE MNGMENT TRAINING: CPT

## 2024-02-01 PROCEDURE — 6370000000 HC RX 637 (ALT 250 FOR IP): Performed by: PHYSICAL MEDICINE & REHABILITATION

## 2024-02-01 PROCEDURE — 97530 THERAPEUTIC ACTIVITIES: CPT

## 2024-02-01 PROCEDURE — 51701 INSERT BLADDER CATHETER: CPT

## 2024-02-01 PROCEDURE — 85025 COMPLETE CBC W/AUTO DIFF WBC: CPT

## 2024-02-01 PROCEDURE — 94760 N-INVAS EAR/PLS OXIMETRY 1: CPT

## 2024-02-01 PROCEDURE — 51798 US URINE CAPACITY MEASURE: CPT

## 2024-02-01 PROCEDURE — 1280000000 HC REHAB R&B

## 2024-02-01 PROCEDURE — 97129 THER IVNTJ 1ST 15 MIN: CPT

## 2024-02-01 PROCEDURE — 83690 ASSAY OF LIPASE: CPT

## 2024-02-01 PROCEDURE — 97162 PT EVAL MOD COMPLEX 30 MIN: CPT

## 2024-02-01 RX ORDER — PANTOPRAZOLE SODIUM 40 MG/1
40 TABLET, DELAYED RELEASE ORAL
Status: DISPENSED | OUTPATIENT
Start: 2024-02-01

## 2024-02-01 RX ORDER — BISACODYL 5 MG/1
5 TABLET, DELAYED RELEASE ORAL ONCE
Status: COMPLETED | OUTPATIENT
Start: 2024-02-01 | End: 2024-02-01

## 2024-02-01 RX ADMIN — DICLOFENAC SODIUM 4 G: 10 GEL TOPICAL at 13:20

## 2024-02-01 RX ADMIN — ENOXAPARIN SODIUM 80 MG: 100 INJECTION SUBCUTANEOUS at 08:38

## 2024-02-01 RX ADMIN — SENNOSIDES AND DOCUSATE SODIUM 1 TABLET: 8.6; 5 TABLET ORAL at 20:48

## 2024-02-01 RX ADMIN — PHENYTOIN SODIUM 100 MG: 100 CAPSULE ORAL at 14:53

## 2024-02-01 RX ADMIN — BISACODYL 5 MG: 5 TABLET, COATED ORAL at 13:20

## 2024-02-01 RX ADMIN — PANTOPRAZOLE SODIUM 40 MG: 40 TABLET, DELAYED RELEASE ORAL at 17:58

## 2024-02-01 RX ADMIN — Medication 3 MG: at 23:50

## 2024-02-01 RX ADMIN — OXYCODONE 5 MG: 5 TABLET ORAL at 01:54

## 2024-02-01 RX ADMIN — PHENYTOIN SODIUM 100 MG: 100 CAPSULE ORAL at 08:36

## 2024-02-01 RX ADMIN — SENNOSIDES AND DOCUSATE SODIUM 1 TABLET: 8.6; 5 TABLET ORAL at 08:47

## 2024-02-01 RX ADMIN — ENOXAPARIN SODIUM 80 MG: 100 INJECTION SUBCUTANEOUS at 20:50

## 2024-02-01 RX ADMIN — DICLOFENAC SODIUM 4 G: 10 GEL TOPICAL at 08:38

## 2024-02-01 RX ADMIN — POLYETHYLENE GLYCOL 3350 17 G: 17 POWDER, FOR SOLUTION ORAL at 14:12

## 2024-02-01 RX ADMIN — ACETAMINOPHEN 1000 MG: 500 TABLET ORAL at 20:51

## 2024-02-01 RX ADMIN — LAMOTRIGINE 200 MG: 100 TABLET ORAL at 08:36

## 2024-02-01 RX ADMIN — BACLOFEN 10 MG: 10 TABLET ORAL at 14:52

## 2024-02-01 RX ADMIN — ACETAMINOPHEN 1000 MG: 500 TABLET ORAL at 14:52

## 2024-02-01 RX ADMIN — DICLOFENAC SODIUM 4 G: 10 GEL TOPICAL at 17:59

## 2024-02-01 RX ADMIN — ACETAMINOPHEN 1000 MG: 500 TABLET ORAL at 05:04

## 2024-02-01 RX ADMIN — OXYCODONE HYDROCHLORIDE 10 MG: 10 TABLET, FILM COATED, EXTENDED RELEASE ORAL at 21:24

## 2024-02-01 RX ADMIN — ONDANSETRON HYDROCHLORIDE 4 MG: 4 TABLET, FILM COATED ORAL at 07:56

## 2024-02-01 RX ADMIN — ATORVASTATIN CALCIUM 10 MG: 10 TABLET, FILM COATED ORAL at 20:47

## 2024-02-01 RX ADMIN — PHENYTOIN SODIUM 100 MG: 100 CAPSULE ORAL at 20:48

## 2024-02-01 RX ADMIN — OXYCODONE HYDROCHLORIDE 10 MG: 10 TABLET, FILM COATED, EXTENDED RELEASE ORAL at 08:36

## 2024-02-01 RX ADMIN — TAMSULOSIN HYDROCHLORIDE 0.4 MG: 0.4 CAPSULE ORAL at 20:47

## 2024-02-01 RX ADMIN — BACLOFEN 10 MG: 10 TABLET ORAL at 20:48

## 2024-02-01 RX ADMIN — PANTOPRAZOLE SODIUM 40 MG: 40 TABLET, DELAYED RELEASE ORAL at 05:04

## 2024-02-01 RX ADMIN — MAGNESIUM HYDROXIDE 30 ML: 400 SUSPENSION ORAL at 14:12

## 2024-02-01 RX ADMIN — Medication 3 MG: at 01:54

## 2024-02-01 RX ADMIN — DICLOFENAC SODIUM 4 G: 10 GEL TOPICAL at 20:40

## 2024-02-01 RX ADMIN — LAMOTRIGINE 200 MG: 100 TABLET ORAL at 20:46

## 2024-02-01 RX ADMIN — BACLOFEN 10 MG: 10 TABLET ORAL at 08:36

## 2024-02-01 RX ADMIN — Medication 400 MG: at 08:35

## 2024-02-01 ASSESSMENT — PAIN DESCRIPTION - DESCRIPTORS
DESCRIPTORS: ACHING;DISCOMFORT;SHARP
DESCRIPTORS: ACHING
DESCRIPTORS: ACHING;SHARP
DESCRIPTORS: ACHING
DESCRIPTORS: ACHING

## 2024-02-01 ASSESSMENT — PAIN DESCRIPTION - ORIENTATION
ORIENTATION: RIGHT
ORIENTATION: RIGHT;LOWER
ORIENTATION: RIGHT;LEFT
ORIENTATION: RIGHT

## 2024-02-01 ASSESSMENT — PAIN DESCRIPTION - LOCATION
LOCATION: HIP;ABDOMEN
LOCATION: LEG
LOCATION: LEG
LOCATION: HIP
LOCATION: HIP
LOCATION: HIP;ABDOMEN

## 2024-02-01 ASSESSMENT — PAIN - FUNCTIONAL ASSESSMENT
PAIN_FUNCTIONAL_ASSESSMENT: PREVENTS OR INTERFERES SOME ACTIVE ACTIVITIES AND ADLS
PAIN_FUNCTIONAL_ASSESSMENT: PREVENTS OR INTERFERES WITH MANY ACTIVE NOT PASSIVE ACTIVITIES
PAIN_FUNCTIONAL_ASSESSMENT: ACTIVITIES ARE NOT PREVENTED

## 2024-02-01 ASSESSMENT — PAIN DESCRIPTION - PAIN TYPE
TYPE: CHRONIC PAIN

## 2024-02-01 ASSESSMENT — PAIN SCALES - WONG BAKER
WONGBAKER_NUMERICALRESPONSE: 2
WONGBAKER_NUMERICALRESPONSE: 0

## 2024-02-01 ASSESSMENT — PAIN DESCRIPTION - FREQUENCY
FREQUENCY: INTERMITTENT

## 2024-02-01 ASSESSMENT — PAIN SCALES - GENERAL
PAINLEVEL_OUTOF10: 7
PAINLEVEL_OUTOF10: 8
PAINLEVEL_OUTOF10: 6
PAINLEVEL_OUTOF10: 4
PAINLEVEL_OUTOF10: 4
PAINLEVEL_OUTOF10: 9
PAINLEVEL_OUTOF10: 6

## 2024-02-01 ASSESSMENT — PAIN DESCRIPTION - ONSET
ONSET: ON-GOING

## 2024-02-01 NOTE — PLAN OF CARE
Problem: Discharge Planning  Goal: Discharge to home or other facility with appropriate resources  2/1/2024 1042 by Juliana Montano RN  Outcome: Progressing     Problem: Safety - Adult  Goal: Free from fall injury  2/1/2024 1042 by Juliana Montano RN  Outcome: Progressing     Problem: ABCDS Injury Assessment  Goal: Absence of physical injury  2/1/2024 1042 by Juliana Montano RN  Outcome: Progressing     Problem: Pain  Goal: Verbalizes/displays adequate comfort level or baseline comfort level  2/1/2024 1042 by Juliana Montano RN  Outcome: Progressing     Problem: Nutrition Deficit:  Goal: Optimize nutritional status  2/1/2024 1042 by uJliana Montano RN  Outcome: Progressing     Problem: Skin/Tissue Integrity  Goal: Absence of new skin breakdown  Description: 1.  Monitor for areas of redness and/or skin breakdown  2.  Assess vascular access sites hourly  3.  Every 4-6 hours minimum:  Change oxygen saturation probe site  4.  Every 4-6 hours:  If on nasal continuous positive airway pressure, respiratory therapy assess nares and determine need for appliance change or resting period.  2/1/2024 1042 by Juliana Montano RN  Outcome: Progressing

## 2024-02-01 NOTE — PROGRESS NOTES
Comprehensive Nutrition Assessment    Type and Reason for Visit:  Reassess    Nutrition Recommendations/Plan:   Continue current diet.      Malnutrition Assessment:  Malnutrition Status:  Severe malnutrition (02/01/24 6345)    Context:  Chronic Illness     Findings of the 6 clinical characteristics of malnutrition:  Energy Intake:  75% or less estimated energy requirements for 1 month or longer  Weight Loss:  Greater than 5% over 1 month (10%)     Body Fat Loss:  No significant body fat loss     Muscle Mass Loss:  Mild muscle mass loss Temples (temporalis)    Nutrition Assessment:    FU - Pt. continues on a regular diet. Meal intakes appear inadequate. Supplement modified at last assessment to Magic cup BID. Acceptance TBD. CBW is down 7# since admission. Still healthy weight per BMI. Will follow up tomorrow for diet/supplement modifications.    Nutrition Related Findings:    Na 134. LBM 1/25. Wound Type: None       Current Nutrition Intake & Therapies:    Average Meal Intake: 0%, 1-25%, 26-50%  Average Supplements Intake: Unable to assess  ADULT DIET; Regular  ADULT ORAL NUTRITION SUPPLEMENT; Dinner, Lunch; Frozen Oral Supplement    Anthropometric Measures:  Height: 185.9 cm (6' 1.2\")  Ideal Body Weight (IBW): 185 lbs (84 kg)       Current Body Weight: 75.8 kg (167 lb 1.7 oz),   IBW.    Current BMI (kg/m2): 21.9                          BMI Categories: Normal Weight (BMI 22.0 to 24.9) age over 65    Estimated Daily Nutrient Needs:  Energy Requirements Based On: Kcal/kg  Weight Used for Energy Requirements: Current  Energy (kcal/day): 2000-2400kcal (25-30kcal/kg)  Weight Used for Protein Requirements: Current  Protein (g/day): 80-96g (1-1.2g/kg)  Method Used for Fluid Requirements: 1 ml/kcal  Fluid (ml/day): 240-780mL    Nutrition Diagnosis:   Unintended weight loss related to inadequate protein-energy intake as evidenced by poor intake prior to admission, weight loss greater than or equal to 5% in 1  month    Nutrition Interventions:   Food and/or Nutrient Delivery: Continue Current Diet, Continue Oral Nutrition Supplement  Nutrition Education/Counseling:  (Monitor need)  Coordination of Nutrition Care: Continue to monitor while inpatient       Goals:  Previous Goal Met: Progress towards Goal(s) Declining  Goals: Meet at least 75% of estimated needs       Nutrition Monitoring and Evaluation:   Behavioral-Environmental Outcomes: None Identified  Food/Nutrient Intake Outcomes: Food and Nutrient Intake  Physical Signs/Symptoms Outcomes: Biochemical Data, GI Status    Discharge Planning:    Too soon to determine     Odalis Agarwal RD  Contact: [phone]
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
## 2024-02-01 NOTE — PROGRESS NOTES
Pt. Complains of pain to rt. Leg. Pt. Has been medicated with [id number] mg and Roxicodone 5 mg. Pt. Continues to complain of pain10/10. Pt. States it feels better when he sits up in chair. Pt. Assisted to chair with steady x's 2. Pt. States he felt immediate relief. Pt. Up in chair at this time. Chair alarm on. Pt. Educated on fall precautions and verbalized understanding.
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
## 2024-02-01 NOTE — PROGRESS NOTES
Department of Physical Medicine & Rehabilitation  Progress Note    Patient Identification:  Eric Ovalles  [id number]  : 1945  Admit date: 2024    Chief Complaint: Subarachnoid hemorrhage (HCC)    Subjective:   No acute events overnight.   Patient seen this afternoon resting in bed. Thus far constipation not resolved. He is willing to trial supp tonight. Has small emesis earlier today but states he is feeling better compared to yesterday. Right hip pain overall stable. I updated wife at the bedside. We discussed medical plan as well as therapy progress and dc planning.    Labs reviewed.     ROS: No f/c, n/v, cp     Objective:  Patient Vitals for the past 24 hrs:   BP Temp Temp src Pulse Resp SpO2 Weight   24 0952 116/61 -- -- 70 -- -- --   24 0906 -- -- -- -- 16 -- --   24 0848 -- -- -- -- -- 96 % --   24 0835 -- -- -- -- 18 -- --   24 0805 (!) 110/56 97.3 °F (36.3 °C) Oral 72 18 97 % --   24 0313 -- -- -- -- -- -- 75.8 kg (167 lb 1.7 oz)   24 0224 -- -- -- -- 18 -- --   24 2222 -- -- -- -- 18 -- --   24 1940 131/65 98.6 °F (37 °C) Oral 87 17 94 % --   24 1838 (!) 125/43 -- -- 89 -- -- --     Const: Alert. No distress, pleasant.   HEENT: Normocephalic, atraumatic. Normal sclera/conjunctiva. MMM.   CV: Regular rate and rhythm.   Resp: No respiratory distress. Lungs distant/diminished at bases   Abd: Soft, nontender, nondistended, NABS+   Ext: +RUE/RLE edema  MSK: right hip ROM limited   Neuro: Alert, oriented, appropriately interactive. Mildly impaired recall.   Psych: Cooperative, appropriate mood and affect    Laboratory data: Available via EMR.   Last 24 hour lab  Recent Results (from the past 24 hour(s))   CBC with Auto Differential    Collection Time: 24  5:03 AM   Result Value Ref Range    WBC 9.2 4.0 - 11.0 K/uL    RBC 2.54 (L) 4.20 - 5.90 M/uL    Hemoglobin 8.0 (L) 13.5 - 17.5 g/dL    Hematocrit 23.1 (L) 40.5 - 52.5 %    MCV  91.0 80.0 - 100.0 fL    MCH 31.6 26.0 - 34.0 pg    MCHC 34.8 31.0 - 36.0 g/dL    RDW 13.7 12.4 - 15.4 %    Platelets 312 135 - 450 K/uL    MPV 7.6 5.0 - 10.5 fL    Neutrophils % 72.5 %    Lymphocytes % 15.6 %    Monocytes % 8.5 %    Eosinophils % 2.5 %    Basophils % 0.9 %    Neutrophils Absolute 6.7 1.7 - 7.7 K/uL    Lymphocytes Absolute 1.4 1.0 - 5.1 K/uL    Monocytes Absolute 0.8 0.0 - 1.3 K/uL    Eosinophils Absolute 0.2 0.0 - 0.6 K/uL    Basophils Absolute 0.1 0.0 - 0.2 K/uL   Basic Metabolic Panel w/ Reflex to MG    Collection Time: 02/01/24  5:03 AM   Result Value Ref Range    Sodium 134 (L) 136 - 145 mmol/L    Potassium reflex Magnesium 4.7 3.5 - 5.1 mmol/L    Chloride 96 (L) 99 - 110 mmol/L    CO2 28 21 - 32 mmol/L    Anion Gap 10 3 - 16    Glucose 92 70 - 99 mg/dL    BUN 20 7 - 20 mg/dL    Creatinine 0.8 0.8 - 1.3 mg/dL    Est, Glom Filt Rate >60 >60    Calcium 8.4 8.3 - 10.6 mg/dL   Hepatic Function Panel    Collection Time: 02/01/24  5:03 AM   Result Value Ref Range    Total Protein 6.2 (L) 6.4 - 8.2 g/dL    Albumin 2.7 (L) 3.4 - 5.0 g/dL    Alkaline Phosphatase 146 (H) 40 - 129 U/L    ALT 23 10 - 40 U/L    AST 34 15 - 37 U/L    Total Bilirubin 0.6 0.0 - 1.0 mg/dL    Bilirubin, Direct 0.3 0.0 - 0.3 mg/dL    Bilirubin, Indirect 0.3 0.0 - 1.0 mg/dL   Lipase    Collection Time: 02/01/24  5:03 AM   Result Value Ref Range    Lipase 17.0 13.0 - 60.0 U/L   POCT Glucose    Collection Time: 02/01/24  7:13 AM   Result Value Ref Range    POC Glucose 99 70 - 99 mg/dl    Performed on ACCU-CHEK          Therapy progress:  Physical therapy:  Bed Mobility:  Overall Assistance Level: Moderate Assistance (with R LE and trunk)  Sit>supine:  Assistance Level: Moderate assistance  Skilled Clinical Factors: with R LE and trunk  Supine>sit:  Skilled Clinical Factors: unable to assess, left in bed for straight cath  Transfers:     Sit>stand:  Assistance Level: Moderate assistance  Skilled Clinical Factors: moderate assist in //  retention on ISC program at baseline  -Intermittent straight cath scheduled Q6 hours  -continue Flomax  -consider resuming home oxybutynin if having symptoms     Neurogenic Bowel: constipation  -senna+colace BID, PRN miralax, MoM, and bisacodyl supp.  -Nausea/vomiting (1/31).   ---Possible due to constipation. KUB + large amount of stool. Recommended supp to RN as last BM was 1/25.   ---Also potentially related to ineffective esophageal motility.   --LFTs, lipase wnl     Safety   -fall and aspiration precautions     PPx  -DVT: therapeutic lovenox for PEs as above  -GI: pantoprazole    Rehab Progress: Making gradual progress. Overall min-modA for mobility. Up to maxA for ADLs. Barriers include: pain, baseline right hemiparesis and sensory deficit, endurance, cognition.    Anticipated Dispo: home with spouse  Services:  PT, OT, RN, ? SLP  DME: [facility]  ELOS: 2 -3 weeks      Padmini Marie MD 2/1/2024, 5:26 PM

## 2024-02-01 NOTE — PROGRESS NOTES
Patient admitted to rehab with Subarachnoid Hemorrhage.  A/Ox4. Transfers with stedy and gait belt x1. Mobility restrictions: right hip pain, right side flaccid. On Regular diet, barely tolerating d/t episodes of vomiting. Medications taken whole with thin liquids. On Lovenox for DVT prophylaxis.  Skin: Reddened coccyx, blanchable . Oxygen: RA. LDA: none. Has been continent of bowel and bladder. LBM 1/25/2024, and took Miralax & MOM today and yesterday, taking senokot. Chair/bed alarms in use and call light in reach. Will monitor for safety. Electronically signed by Juliana Montano RN on 2/1/2024 at 5:02 PM

## 2024-02-01 NOTE — PROGRESS NOTES
Call in to [facility], confirmation that the appointment on 2/6/2024 has been cancelled and rescheduled for 3/19/2024 at 11:00 am. Wife aware. Electronically signed by Juliana Montano RN on 2/1/2024 at 3:48 PM

## 2024-02-01 NOTE — PLAN OF CARE
Problem: Discharge Planning  Goal: Discharge to home or other facility with appropriate resources  1/31/2024 2254 by Megna Johnson RN  Outcome: Progressing  Flowsheets (Taken 1/31/2024 2130)  Discharge to home or other facility with appropriate resources: Identify barriers to discharge with patient and caregiver  1/31/2024 1724 by Juliana Montano RN  Outcome: Progressing     Problem: Safety - Adult  Goal: Free from fall injury  1/31/2024 2254 by Megan Johnson RN  Outcome: Progressing  1/31/2024 1724 by Juliana Montano RN  Outcome: Progressing     Problem: ABCDS Injury Assessment  Goal: Absence of physical injury  1/31/2024 2254 by Megan Johnson RN  Outcome: Progressing  1/31/2024 1724 by Juliana Montano RN  Outcome: Progressing     Problem: Pain  Goal: Verbalizes/displays adequate comfort level or baseline comfort level  1/31/2024 2254 by Megan Johnson RN  Outcome: Progressing  1/31/2024 1724 by Juliana Montano RN  Outcome: Progressing     Problem: Nutrition Deficit:  Goal: Optimize nutritional status  1/31/2024 2254 by Megan Johnson RN  Outcome: Progressing  1/31/2024 1724 by Juliana Montano RN  Outcome: Progressing     Problem: Skin/Tissue Integrity  Goal: Absence of new skin breakdown  Description: 1.  Monitor for areas of redness and/or skin breakdown  2.  Assess vascular access sites hourly  3.  Every 4-6 hours minimum:  Change oxygen saturation probe site  4.  Every 4-6 hours:  If on nasal continuous positive airway pressure, respiratory therapy assess nares and determine need for appliance change or resting period.  1/31/2024 2254 by Megan Johnson RN  Outcome: Progressing  1/31/2024 1724 by Juliana Montano RN  Outcome: Progressing
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
## 2024-02-01 NOTE — PROGRESS NOTES
Perfect Serve in to Dr. Marie:    Pt states BP usually in the 120's to 130's, my BP is too low.  has not taken coreg today or yesterday, BP at 0750: 110/56  at 180:0 107/57.  Question: can we change the perimeter to hold for systolic <110  instead of <100?  Awaiting reply. Electronically signed by Juliana Montano RN on 2/1/2024 at 6:07 PM    Immediate reply: OK Electronically signed by Juliana Montano RN on 2/1/2024 at 6:10 PM

## 2024-02-02 PROCEDURE — 51701 INSERT BLADDER CATHETER: CPT

## 2024-02-02 PROCEDURE — 97116 GAIT TRAINING THERAPY: CPT

## 2024-02-02 PROCEDURE — 97130 THER IVNTJ EA ADDL 15 MIN: CPT

## 2024-02-02 PROCEDURE — 6370000000 HC RX 637 (ALT 250 FOR IP): Performed by: PHYSICAL MEDICINE & REHABILITATION

## 2024-02-02 PROCEDURE — 97110 THERAPEUTIC EXERCISES: CPT

## 2024-02-02 PROCEDURE — 51798 US URINE CAPACITY MEASURE: CPT

## 2024-02-02 PROCEDURE — 1280000000 HC REHAB R&B

## 2024-02-02 PROCEDURE — 6370000000 HC RX 637 (ALT 250 FOR IP): Performed by: STUDENT IN AN ORGANIZED HEALTH CARE EDUCATION/TRAINING PROGRAM

## 2024-02-02 PROCEDURE — 6360000002 HC RX W HCPCS: Performed by: PHYSICAL MEDICINE & REHABILITATION

## 2024-02-02 PROCEDURE — 94760 N-INVAS EAR/PLS OXIMETRY 1: CPT

## 2024-02-02 PROCEDURE — 97530 THERAPEUTIC ACTIVITIES: CPT

## 2024-02-02 PROCEDURE — 97542 WHEELCHAIR MNGMENT TRAINING: CPT

## 2024-02-02 PROCEDURE — 97129 THER IVNTJ 1ST 15 MIN: CPT

## 2024-02-02 RX ADMIN — ONDANSETRON HYDROCHLORIDE 4 MG: 4 TABLET, FILM COATED ORAL at 11:36

## 2024-02-02 RX ADMIN — ENOXAPARIN SODIUM 80 MG: 100 INJECTION SUBCUTANEOUS at 09:01

## 2024-02-02 RX ADMIN — ENOXAPARIN SODIUM 80 MG: 100 INJECTION SUBCUTANEOUS at 20:31

## 2024-02-02 RX ADMIN — BACLOFEN 10 MG: 10 TABLET ORAL at 09:01

## 2024-02-02 RX ADMIN — Medication 3 MG: at 22:32

## 2024-02-02 RX ADMIN — OXYCODONE HYDROCHLORIDE 10 MG: 10 TABLET, FILM COATED, EXTENDED RELEASE ORAL at 09:01

## 2024-02-02 RX ADMIN — PANTOPRAZOLE SODIUM 40 MG: 40 TABLET, DELAYED RELEASE ORAL at 05:02

## 2024-02-02 RX ADMIN — Medication 400 MG: at 09:01

## 2024-02-02 RX ADMIN — CARVEDILOL 3.12 MG: 3.12 TABLET, FILM COATED ORAL at 16:59

## 2024-02-02 RX ADMIN — SENNOSIDES AND DOCUSATE SODIUM 1 TABLET: 8.6; 5 TABLET ORAL at 20:30

## 2024-02-02 RX ADMIN — ACETAMINOPHEN 1000 MG: 500 TABLET ORAL at 20:28

## 2024-02-02 RX ADMIN — PHENYTOIN SODIUM 100 MG: 100 CAPSULE ORAL at 13:40

## 2024-02-02 RX ADMIN — DICLOFENAC SODIUM 4 G: 10 GEL TOPICAL at 08:59

## 2024-02-02 RX ADMIN — PANTOPRAZOLE SODIUM 40 MG: 40 TABLET, DELAYED RELEASE ORAL at 16:57

## 2024-02-02 RX ADMIN — PHENYTOIN SODIUM 100 MG: 100 CAPSULE ORAL at 09:01

## 2024-02-02 RX ADMIN — BACLOFEN 10 MG: 10 TABLET ORAL at 13:40

## 2024-02-02 RX ADMIN — ATORVASTATIN CALCIUM 10 MG: 10 TABLET, FILM COATED ORAL at 20:30

## 2024-02-02 RX ADMIN — OXYCODONE HYDROCHLORIDE 10 MG: 10 TABLET, FILM COATED, EXTENDED RELEASE ORAL at 20:31

## 2024-02-02 RX ADMIN — LAMOTRIGINE 200 MG: 100 TABLET ORAL at 09:01

## 2024-02-02 RX ADMIN — ACETAMINOPHEN 1000 MG: 500 TABLET ORAL at 04:26

## 2024-02-02 RX ADMIN — OXYCODONE HYDROCHLORIDE 10 MG: 10 TABLET ORAL at 10:21

## 2024-02-02 RX ADMIN — SENNOSIDES AND DOCUSATE SODIUM 1 TABLET: 8.6; 5 TABLET ORAL at 09:01

## 2024-02-02 RX ADMIN — OXYCODONE HYDROCHLORIDE 10 MG: 10 TABLET ORAL at 23:49

## 2024-02-02 RX ADMIN — LAMOTRIGINE 200 MG: 100 TABLET ORAL at 20:30

## 2024-02-02 RX ADMIN — BACLOFEN 10 MG: 10 TABLET ORAL at 20:29

## 2024-02-02 RX ADMIN — DICLOFENAC SODIUM 4 G: 10 GEL TOPICAL at 20:28

## 2024-02-02 RX ADMIN — PHENYTOIN SODIUM 100 MG: 100 CAPSULE ORAL at 20:30

## 2024-02-02 RX ADMIN — TAMSULOSIN HYDROCHLORIDE 0.4 MG: 0.4 CAPSULE ORAL at 20:30

## 2024-02-02 RX ADMIN — ACETAMINOPHEN 1000 MG: 500 TABLET ORAL at 13:39

## 2024-02-02 RX ADMIN — DICLOFENAC SODIUM 4 G: 10 GEL TOPICAL at 13:39

## 2024-02-02 RX ADMIN — DICLOFENAC SODIUM 4 G: 10 GEL TOPICAL at 16:57

## 2024-02-02 ASSESSMENT — PAIN SCALES - GENERAL
PAINLEVEL_OUTOF10: 0
PAINLEVEL_OUTOF10: 6
PAINLEVEL_OUTOF10: 5
PAINLEVEL_OUTOF10: 8
PAINLEVEL_OUTOF10: 10
PAINLEVEL_OUTOF10: 10
PAINLEVEL_OUTOF10: 8
PAINLEVEL_OUTOF10: 7

## 2024-02-02 ASSESSMENT — PAIN - FUNCTIONAL ASSESSMENT
PAIN_FUNCTIONAL_ASSESSMENT: PREVENTS OR INTERFERES WITH MANY ACTIVE NOT PASSIVE ACTIVITIES
PAIN_FUNCTIONAL_ASSESSMENT: ACTIVITIES ARE NOT PREVENTED
PAIN_FUNCTIONAL_ASSESSMENT: PREVENTS OR INTERFERES WITH MANY ACTIVE NOT PASSIVE ACTIVITIES
PAIN_FUNCTIONAL_ASSESSMENT: PREVENTS OR INTERFERES WITH ALL ACTIVE AND SOME PASSIVE ACTIVITIES

## 2024-02-02 ASSESSMENT — PAIN DESCRIPTION - DESCRIPTORS
DESCRIPTORS: ACHING
DESCRIPTORS: SHARP;ACHING;DISCOMFORT
DESCRIPTORS: ACHING
DESCRIPTORS: ACHING

## 2024-02-02 ASSESSMENT — PAIN DESCRIPTION - LOCATION
LOCATION: HIP
LOCATION: HIP;GROIN
LOCATION: ARM

## 2024-02-02 ASSESSMENT — PAIN SCALES - WONG BAKER
WONGBAKER_NUMERICALRESPONSE: 0
WONGBAKER_NUMERICALRESPONSE: 0
WONGBAKER_NUMERICALRESPONSE: 2
WONGBAKER_NUMERICALRESPONSE: 0
WONGBAKER_NUMERICALRESPONSE: 4
WONGBAKER_NUMERICALRESPONSE: 0

## 2024-02-02 ASSESSMENT — PAIN DESCRIPTION - ORIENTATION
ORIENTATION: RIGHT
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
## 2024-02-02 NOTE — PLAN OF CARE
Problem: Discharge Planning  Goal: Discharge to home or other facility with appropriate resources  2/2/2024 1035 by Martir Linares RN  Outcome: Progressing  Flowsheets (Taken 2/2/2024 0815)  Discharge to home or other facility with appropriate resources:   Identify barriers to discharge with patient and caregiver   Arrange for needed discharge resources and transportation as appropriate   Identify discharge learning needs (meds, wound care, etc)  2/1/2024 2357 by Silvana De Jesus RN  Outcome: Progressing     Problem: Safety - Adult  Goal: Free from fall injury  2/2/2024 1035 by Martir Linares RN  Outcome: Progressing  Flowsheets (Taken 2/2/2024 1032)  Free From Fall Injury:   Instruct family/caregiver on patient safety   Based on caregiver fall risk screen, instruct family/caregiver to ask for assistance with transferring infant if caregiver noted to have fall risk factors  2/1/2024 2357 by Silvana De Jesus RN  Outcome: Progressing     Problem: ABCDS Injury Assessment  Goal: Absence of physical injury  2/2/2024 1035 by Martir Linares RN  Outcome: Progressing  Flowsheets (Taken 2/2/2024 1032)  Absence of Physical Injury: Implement safety measures based on patient assessment  2/1/2024 2357 by Silvana De Jesus RN  Outcome: Progressing     Problem: Pain  Goal: Verbalizes/displays adequate comfort level or baseline comfort level  2/2/2024 1035 by Martir Linares RN  Outcome: Progressing  2/1/2024 2357 by Silvana De Jesus RN  Outcome: Progressing     Problem: Nutrition Deficit:  Goal: Optimize nutritional status  2/2/2024 1035 by Martir Linares RN  Outcome: Progressing  2/1/2024 2357 by Silvana De Jesus RN  Outcome: Progressing  Flowsheets (Taken 2/1/2024 1540 by Odalis Agarwal RD)  Nutrient intake appropriate for improving, restoring, or maintaining nutritional needs:   Assess nutritional status and recommend course of action   Monitor oral intake, labs, and treatment plans   Recommend appropriate diets, oral
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
## 2024-02-02 NOTE — PROGRESS NOTES
Comprehensive Nutrition Assessment    Type and Reason for Visit:  Reassess    Nutrition Recommendations/Plan:   Stop magic cup  Pt. To order ice cream with dinner.      Nutrition Assessment:    Pt. Endorses poor PO acceptance d/t temperature issues. He reports his food is not always as hot as he prefers it to be at meal times. He does have staff heat up items however some entrees reheat more easily than others. Pt. does not want to continue magic cup BID. We discussed other options and he agrees to order ice cream with at least dinner every day. Will closely monitor diet acceptance and nutritional adequacy.      Odalis Agarwal RD  Contact: [phone]

## 2024-02-02 NOTE — PROGRESS NOTES
Department of Physical Medicine & Rehabilitation  Progress Note    Patient Identification:  Eric Ovalles  [id number]  : 1945  Admit date: 2024    Chief Complaint: Subarachnoid hemorrhage (HCC)    Subjective:   No acute events overnight.   Patient seen this am sitting up in gym. Reports feeling much better today. He did move his bowels and denies any nausea/emesis. Right hip pain slightly improved.     Labs reviewed.     ROS: No f/c, n/v, cp     Objective:  Patient Vitals for the past 24 hrs:   BP Temp Temp src Pulse Resp SpO2 Weight   24 1659 131/68 -- -- 90 -- -- --   24 1051 -- -- -- -- 17 -- --   24 1021 -- -- -- -- 17 -- --   24 0931 -- -- -- -- 17 -- --   24 0930 -- -- -- -- -- 90 % --   24 0902 (!) 108/58 -- -- 76 -- -- --   24 0707 (!) 104/50 97.5 °F (36.4 °C) Oral 75 17 95 % --   24 0500 -- -- -- -- -- -- 76.8 kg (169 lb 5 oz)   24 2154 -- -- -- -- 16 -- --   24 2124 -- -- -- -- 16 -- --   24 2030 98/68 98.2 °F (36.8 °C) Oral 78 -- 96 % --   24 1756 (!) 107/57 -- -- 77 -- -- --     Const: Alert. No distress, pleasant.   HEENT: Normocephalic, atraumatic. Normal sclera/conjunctiva. MMM.   CV: Regular rate and rhythm.   Resp: No respiratory distress. Lungs distant/diminished at bases   Abd: Soft, nontender, nondistended, NABS+   Ext: +RUE/RLE edema  MSK: right hip ROM limited   Neuro: Alert, oriented, appropriately interactive. Mildly impaired recall.   Psych: Cooperative, appropriate mood and affect    Laboratory data: Available via EMR.   Last 24 hour lab  No results found for this or any previous visit (from the past 24 hour(s)).        Therapy progress:  Physical therapy:  Bed Mobility:  Overall Assistance Level: Moderate Assistance (with R LE and trunk)  Sit>supine:  Assistance Level: Moderate assistance  Skilled Clinical Factors: with R LE and trunk  Supine>sit:  Skilled Clinical Factors: unable to assess, left in  bed for straight cath  Transfers:     Sit>stand:  Assistance Level: Moderate assistance  Skilled Clinical Factors: moderate assist in // bars, more pina in R hip today and could leaned to the L, could not bear weight no R LE this morning, PT called RN who brought pain meds  Stand>sit:  Assistance Level: Moderate assistance  Bed<>chair  Skilled Clinical Factors: used tanisha stedy because pt threw up right before transfer and pt feeling weak  Stand Pivot:  Assistance Level: Moderate assistance  Skilled Clinical Factors: to his L  Lateral transfer:     Car transfer:     Ambulation:  Surface:  (unable, pt only able to stand at // bar)  Stairs:     Curb:     Wheelchair:  Surface: Level surface  Device: Standard wheelchair  Assistance Required to Manage Parts: Right leg rest, Left leg rest  Assistance Level for Propulsion: Stand by assist  Propulsion Method: Left upper extremity, Left lower extremity (encouraged pt to use L UE in combinatin with L LE but pt prefers to use mostly LE)  Propulsion Quality: Slow velocity, Short strokes  Propulsion Distance: 150' with 1 turn and 2 surface transitions, severel short rest breaks    Occupational therapy:   Feeding     Grooming/Oral Hygiene  Assistance Level: Modified independent  Skilled Clinical Factors: seated in wheelchair at sink to complete oral care, hair indep  UE Bathing  Assistance Level: Supervision  Skilled Clinical Factors: seated in w/c ,used long sponge with cues for axilla  LE Bathing  Assistance Level: Minimal assistance  Skilled Clinical Factors: seated in w/c long sponge for LE with cues, assisted wiht buttocks when stood for LB dressing  UE Dressing  Assistance Level: Moderate assistance  Skilled Clinical Factors: assist with RUE and down trunk, pt fatigued  LE Dressing  Assistance Level: Dependent, Maximum assistance  Skilled Clinical Factors: assisted to thread briefs and pants over feet, attempted stance at grab bar, but unable.  Requires assist of stedy to

## 2024-02-02 NOTE — PLAN OF CARE
Problem: Discharge Planning  Goal: Discharge to home or other facility with appropriate resources  2/1/2024 2357 by Silvana De Jesus RN  Outcome: Progressing  2/1/2024 1042 by Juliana Montano RN  Outcome: Progressing     Problem: Safety - Adult  Goal: Free from fall injury  2/1/2024 2357 by Silvana De Jesus RN  Outcome: Progressing  2/1/2024 1042 by Juliana Montano RN  Outcome: Progressing     Problem: ABCDS Injury Assessment  Goal: Absence of physical injury  2/1/2024 2357 by Silvana De Jesus RN  Outcome: Progressing  2/1/2024 1042 by Juliana Montano RN  Outcome: Progressing     Problem: Pain  Goal: Verbalizes/displays adequate comfort level or baseline comfort level  2/1/2024 2357 by Silvana De Jesus RN  Outcome: Progressing  2/1/2024 1042 by Juliana Montano RN  Outcome: Progressing     Problem: Nutrition Deficit:  Goal: Optimize nutritional status  2/1/2024 2357 by Silvana De Jesus RN  Outcome: Progressing  Flowsheets (Taken 2/1/2024 1540 by Odalis Agarwal RD)  Nutrient intake appropriate for improving, restoring, or maintaining nutritional needs:   Assess nutritional status and recommend course of action   Monitor oral intake, labs, and treatment plans   Recommend appropriate diets, oral nutritional supplements, and vitamin/mineral supplements   Order, calculate, and assess calorie counts as needed   Recommend, monitor, and adjust tube feedings and TPN/PPN based on assessed needs   Provide specific nutrition education to patient or family as appropriate  2/1/2024 1042 by Juliana Montano RN  Outcome: Progressing     Problem: Skin/Tissue Integrity  Goal: Absence of new skin breakdown  Description: 1.  Monitor for areas of redness and/or skin breakdown  2.  Assess vascular access sites hourly  3.  Every 4-6 hours minimum:  Change oxygen saturation probe site  4.  Every 4-6 hours:  If on nasal continuous positive airway pressure, respiratory therapy assess nares and determine  need for appliance change or resting period.  2/1/2024 2357 by Silvana De Jesus, RN  Outcome: Progressing  2/1/2024 1042 by Juliana Montano, RN  Outcome: Progressing

## 2024-02-02 NOTE — PROGRESS NOTES
Patient admitted to rehab with subarachnoid hemorrhage.  A/Ox4. Transfers with stedy X1-2. Mobility restrictions: right side flaccid. On regular diet, tolerating well. Medications taken whole with thin liquids. On Lovenox for DVT prophylaxis.  Skin: blanchable redness to coccyx, scattered Ecchymosis . Oxygen: RA. Has been incontinent of bowel and straight cath of bladder to void. LBM 2/2/24. No distress or discomfort noted. Pt able to voice needs. All needs met at this time. Chair/bed alarms in use and call light in reach.
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
## 2024-02-03 PROCEDURE — 6360000002 HC RX W HCPCS: Performed by: PHYSICAL MEDICINE & REHABILITATION

## 2024-02-03 PROCEDURE — 6370000000 HC RX 637 (ALT 250 FOR IP): Performed by: PHYSICAL MEDICINE & REHABILITATION

## 2024-02-03 PROCEDURE — 6370000000 HC RX 637 (ALT 250 FOR IP): Performed by: STUDENT IN AN ORGANIZED HEALTH CARE EDUCATION/TRAINING PROGRAM

## 2024-02-03 PROCEDURE — 51798 US URINE CAPACITY MEASURE: CPT

## 2024-02-03 PROCEDURE — 51701 INSERT BLADDER CATHETER: CPT

## 2024-02-03 PROCEDURE — 94760 N-INVAS EAR/PLS OXIMETRY 1: CPT

## 2024-02-03 PROCEDURE — 97535 SELF CARE MNGMENT TRAINING: CPT

## 2024-02-03 PROCEDURE — 97530 THERAPEUTIC ACTIVITIES: CPT

## 2024-02-03 PROCEDURE — 1280000000 HC REHAB R&B

## 2024-02-03 RX ORDER — ENOXAPARIN SODIUM 100 MG/ML
80 INJECTION SUBCUTANEOUS 2 TIMES DAILY
Status: ON HOLD | COMMUNITY

## 2024-02-03 RX ORDER — TAMSULOSIN HYDROCHLORIDE 0.4 MG/1
0.4 CAPSULE ORAL NIGHTLY
Status: ON HOLD | COMMUNITY

## 2024-02-03 RX ORDER — PANTOPRAZOLE SODIUM 40 MG/1
40 TABLET, DELAYED RELEASE ORAL 2 TIMES DAILY
Status: ON HOLD | COMMUNITY

## 2024-02-03 RX ORDER — OXYBUTYNIN CHLORIDE 5 MG/1
5 TABLET ORAL 4 TIMES DAILY
Status: ON HOLD | COMMUNITY

## 2024-02-03 RX ADMIN — ACETAMINOPHEN 1000 MG: 500 TABLET ORAL at 14:29

## 2024-02-03 RX ADMIN — BACLOFEN 10 MG: 10 TABLET ORAL at 14:30

## 2024-02-03 RX ADMIN — ENOXAPARIN SODIUM 80 MG: 100 INJECTION SUBCUTANEOUS at 07:34

## 2024-02-03 RX ADMIN — SENNOSIDES AND DOCUSATE SODIUM 1 TABLET: 8.6; 5 TABLET ORAL at 20:35

## 2024-02-03 RX ADMIN — BACLOFEN 10 MG: 10 TABLET ORAL at 07:37

## 2024-02-03 RX ADMIN — DICLOFENAC SODIUM 4 G: 10 GEL TOPICAL at 20:36

## 2024-02-03 RX ADMIN — OXYCODONE HYDROCHLORIDE 10 MG: 10 TABLET, FILM COATED, EXTENDED RELEASE ORAL at 07:36

## 2024-02-03 RX ADMIN — PHENYTOIN SODIUM 100 MG: 100 CAPSULE ORAL at 20:35

## 2024-02-03 RX ADMIN — DICLOFENAC SODIUM 4 G: 10 GEL TOPICAL at 07:37

## 2024-02-03 RX ADMIN — LAMOTRIGINE 200 MG: 100 TABLET ORAL at 20:36

## 2024-02-03 RX ADMIN — ACETAMINOPHEN 1000 MG: 500 TABLET ORAL at 04:57

## 2024-02-03 RX ADMIN — BACLOFEN 10 MG: 10 TABLET ORAL at 20:35

## 2024-02-03 RX ADMIN — OXYCODONE HYDROCHLORIDE 10 MG: 10 TABLET, FILM COATED, EXTENDED RELEASE ORAL at 20:36

## 2024-02-03 RX ADMIN — ATORVASTATIN CALCIUM 10 MG: 10 TABLET, FILM COATED ORAL at 20:35

## 2024-02-03 RX ADMIN — DICLOFENAC SODIUM 4 G: 10 GEL TOPICAL at 12:26

## 2024-02-03 RX ADMIN — CARVEDILOL 3.12 MG: 3.12 TABLET, FILM COATED ORAL at 16:45

## 2024-02-03 RX ADMIN — PHENYTOIN SODIUM 100 MG: 100 CAPSULE ORAL at 07:34

## 2024-02-03 RX ADMIN — DICLOFENAC SODIUM 4 G: 10 GEL TOPICAL at 17:53

## 2024-02-03 RX ADMIN — TAMSULOSIN HYDROCHLORIDE 0.4 MG: 0.4 CAPSULE ORAL at 20:36

## 2024-02-03 RX ADMIN — LAMOTRIGINE 200 MG: 100 TABLET ORAL at 07:34

## 2024-02-03 RX ADMIN — ACETAMINOPHEN 1000 MG: 500 TABLET ORAL at 20:35

## 2024-02-03 RX ADMIN — Medication 400 MG: at 07:37

## 2024-02-03 RX ADMIN — PANTOPRAZOLE SODIUM 40 MG: 40 TABLET, DELAYED RELEASE ORAL at 05:05

## 2024-02-03 RX ADMIN — OXYCODONE HYDROCHLORIDE 10 MG: 10 TABLET ORAL at 19:04

## 2024-02-03 RX ADMIN — OXYCODONE 5 MG: 5 TABLET ORAL at 12:25

## 2024-02-03 RX ADMIN — PHENYTOIN SODIUM 100 MG: 100 CAPSULE ORAL at 14:30

## 2024-02-03 RX ADMIN — ENOXAPARIN SODIUM 80 MG: 100 INJECTION SUBCUTANEOUS at 20:37

## 2024-02-03 RX ADMIN — SENNOSIDES AND DOCUSATE SODIUM 1 TABLET: 8.6; 5 TABLET ORAL at 07:35

## 2024-02-03 RX ADMIN — PANTOPRAZOLE SODIUM 40 MG: 40 TABLET, DELAYED RELEASE ORAL at 16:45

## 2024-02-03 ASSESSMENT — PAIN DESCRIPTION - LOCATION
LOCATION: GROIN
LOCATION: HIP
LOCATION: HIP
LOCATION: GROIN
LOCATION: HIP

## 2024-02-03 ASSESSMENT — PAIN DESCRIPTION - ORIENTATION
ORIENTATION: RIGHT

## 2024-02-03 ASSESSMENT — PAIN SCALES - WONG BAKER
WONGBAKER_NUMERICALRESPONSE: 0

## 2024-02-03 ASSESSMENT — PAIN SCALES - GENERAL
PAINLEVEL_OUTOF10: 0
PAINLEVEL_OUTOF10: 2
PAINLEVEL_OUTOF10: 5
PAINLEVEL_OUTOF10: 0
PAINLEVEL_OUTOF10: 0
PAINLEVEL_OUTOF10: 5
PAINLEVEL_OUTOF10: 7
PAINLEVEL_OUTOF10: 5
PAINLEVEL_OUTOF10: 7
PAINLEVEL_OUTOF10: 3
PAINLEVEL_OUTOF10: 3
PAINLEVEL_OUTOF10: 0
PAINLEVEL_OUTOF10: 6
PAINLEVEL_OUTOF10: 5

## 2024-02-03 ASSESSMENT — PAIN DESCRIPTION - DESCRIPTORS
DESCRIPTORS: DULL;ACHING
DESCRIPTORS: SORE;ACHING
DESCRIPTORS: ACHING
DESCRIPTORS: ACHING;DULL
DESCRIPTORS: ACHING;DISCOMFORT

## 2024-02-03 ASSESSMENT — PAIN - FUNCTIONAL ASSESSMENT
PAIN_FUNCTIONAL_ASSESSMENT: ACTIVITIES ARE NOT PREVENTED
PAIN_FUNCTIONAL_ASSESSMENT: PREVENTS OR INTERFERES WITH MANY ACTIVE NOT PASSIVE ACTIVITIES
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
## 2024-02-03 NOTE — PROGRESS NOTES
Patient admitted to rehab with subarachnoid hemmorrhage.  A/Ox4. Transfers with stedy x1. Mobility restrictions: WBATA R SIDE flaccid. On regular diet, tolerating well. Medications taken whole with thins. On lovenox for DVT prophylaxis.  Skin: redness to buttocks, scattered bruising. Oxygen: RA. LDA: NONE. Has been contient of bowel and q6 hrs straight cath of bladder. LBM 2/3. Chair/bed alarms in use and call light in reach. Will monitor for safety.

## 2024-02-03 NOTE — PLAN OF CARE
Problem: Discharge Planning  Goal: Discharge to home or other facility with appropriate resources  2/3/2024 1008 by Melvin Sandy RN  Outcome: Progressing  Flowsheets (Taken 2/3/2024 1008)  Discharge to home or other facility with appropriate resources:   Identify barriers to discharge with patient and caregiver   Identify discharge learning needs (meds, wound care, etc)   Arrange for needed discharge resources and transportation as appropriate  2/3/2024 0019 by Silvana De Jesus RN  Outcome: Progressing     Problem: Safety - Adult  Goal: Free from fall injury  2/3/2024 1008 by Melvin Sandy RN  Outcome: Progressing  Flowsheets (Taken 2/2/2024 1032 by Martir Linares, RN)  Free From Fall Injury:   Instruct family/caregiver on patient safety   Based on caregiver fall risk screen, instruct family/caregiver to ask for assistance with transferring infant if caregiver noted to have fall risk factors  2/3/2024 0019 by Silvana De Jesus RN  Outcome: Progressing     Problem: ABCDS Injury Assessment  Goal: Absence of physical injury  2/3/2024 1008 by Melvin Sandy RN  Outcome: Progressing  Flowsheets (Taken 2/2/2024 1032 by Martir Linares, RN)  Absence of Physical Injury: Implement safety measures based on patient assessment  2/3/2024 0019 by Silvana De Jesus RN  Outcome: Progressing     Problem: Pain  Goal: Verbalizes/displays adequate comfort level or baseline comfort level  2/3/2024 1008 by Melvin Sandy RN  Outcome: Progressing  Flowsheets (Taken 2/3/2024 1008)  Verbalizes/displays adequate comfort level or baseline comfort level:   Encourage patient to monitor pain and request assistance   Administer analgesics based on type and severity of pain and evaluate response   Assess pain using appropriate pain scale   Implement non-pharmacological measures as appropriate and evaluate response  2/3/2024 0019 by Silvana De Jesus RN  Outcome: Progressing     Problem: Nutrition Deficit:  Goal: Optimize nutritional  Administer medication as ordered   Assess activities of daily living deficits and provide assistive devices as needed   Assist and instruct patient to increase activity and self care as tolerated     Problem: Genitourinary - Adult  Goal: Absence of urinary retention  Outcome: Progressing  Flowsheets (Taken 2/3/2024 1009)  Absence of urinary retention: Monitor intake/output and perform bladder scan as needed

## 2024-02-03 NOTE — PLAN OF CARE
Problem: Discharge Planning  Goal: Discharge to home or other facility with appropriate resources  2/3/2024 0019 by Silvana De Jesus RN  Outcome: Progressing  2/2/2024 1035 by Martir Linares RN  Outcome: Progressing  Flowsheets (Taken 2/2/2024 0815)  Discharge to home or other facility with appropriate resources:   Identify barriers to discharge with patient and caregiver   Arrange for needed discharge resources and transportation as appropriate   Identify discharge learning needs (meds, wound care, etc)     Problem: Safety - Adult  Goal: Free from fall injury  2/3/2024 0019 by Silvana De Jesus RN  Outcome: Progressing  2/2/2024 1035 by Martir Linares RN  Outcome: Progressing  Flowsheets (Taken 2/2/2024 1032)  Free From Fall Injury:   Instruct family/caregiver on patient safety   Based on caregiver fall risk screen, instruct family/caregiver to ask for assistance with transferring infant if caregiver noted to have fall risk factors     Problem: ABCDS Injury Assessment  Goal: Absence of physical injury  2/3/2024 0019 by Silvana De Jesus RN  Outcome: Progressing  2/2/2024 1035 by Martir Linares RN  Outcome: Progressing  Flowsheets (Taken 2/2/2024 1032)  Absence of Physical Injury: Implement safety measures based on patient assessment     Problem: Pain  Goal: Verbalizes/displays adequate comfort level or baseline comfort level  2/3/2024 0019 by Silvana De Jesus RN  Outcome: Progressing  2/2/2024 1035 by Martir Linares RN  Outcome: Progressing     Problem: Nutrition Deficit:  Goal: Optimize nutritional status  2/3/2024 0019 by Silvana De Jesus RN  Outcome: Progressing  2/2/2024 1035 by Martir Linares RN  Outcome: Progressing     Problem: Skin/Tissue Integrity  Goal: Absence of new skin breakdown  Description: 1.  Monitor for areas of redness and/or skin breakdown  2.  Assess vascular access sites hourly  3.  Every 4-6 hours minimum:  Change oxygen saturation probe site  4.  Every 4-6 hours:  If on nasal continuous

## 2024-02-04 VITALS
BODY MASS INDEX: 22.79 KG/M2 | DIASTOLIC BLOOD PRESSURE: 63 MMHG | OXYGEN SATURATION: 94 % | SYSTOLIC BLOOD PRESSURE: 119 MMHG | RESPIRATION RATE: 18 BRPM | HEART RATE: 76 BPM | HEIGHT: 73 IN | TEMPERATURE: 98 F | WEIGHT: 171.96 LBS

## 2024-02-04 PROCEDURE — 51798 US URINE CAPACITY MEASURE: CPT

## 2024-02-04 PROCEDURE — 6370000000 HC RX 637 (ALT 250 FOR IP): Performed by: PHYSICAL MEDICINE & REHABILITATION

## 2024-02-04 PROCEDURE — 1280000000 HC REHAB R&B

## 2024-02-04 PROCEDURE — 6370000000 HC RX 637 (ALT 250 FOR IP): Performed by: STUDENT IN AN ORGANIZED HEALTH CARE EDUCATION/TRAINING PROGRAM

## 2024-02-04 PROCEDURE — 51701 INSERT BLADDER CATHETER: CPT

## 2024-02-04 PROCEDURE — 6360000002 HC RX W HCPCS: Performed by: PHYSICAL MEDICINE & REHABILITATION

## 2024-02-04 RX ADMIN — ATORVASTATIN CALCIUM 10 MG: 10 TABLET, FILM COATED ORAL at 21:39

## 2024-02-04 RX ADMIN — ACETAMINOPHEN 1000 MG: 500 TABLET ORAL at 13:39

## 2024-02-04 RX ADMIN — ENOXAPARIN SODIUM 80 MG: 100 INJECTION SUBCUTANEOUS at 21:38

## 2024-02-04 RX ADMIN — PHENYTOIN SODIUM 100 MG: 100 CAPSULE ORAL at 13:39

## 2024-02-04 RX ADMIN — LAMOTRIGINE 200 MG: 100 TABLET ORAL at 21:39

## 2024-02-04 RX ADMIN — DICLOFENAC SODIUM 4 G: 10 GEL TOPICAL at 07:43

## 2024-02-04 RX ADMIN — OXYCODONE HYDROCHLORIDE 10 MG: 10 TABLET, FILM COATED, EXTENDED RELEASE ORAL at 21:39

## 2024-02-04 RX ADMIN — DICLOFENAC SODIUM 4 G: 10 GEL TOPICAL at 21:39

## 2024-02-04 RX ADMIN — BACLOFEN 10 MG: 10 TABLET ORAL at 07:42

## 2024-02-04 RX ADMIN — OXYCODONE HYDROCHLORIDE 10 MG: 10 TABLET, FILM COATED, EXTENDED RELEASE ORAL at 07:41

## 2024-02-04 RX ADMIN — SENNOSIDES AND DOCUSATE SODIUM 1 TABLET: 8.6; 5 TABLET ORAL at 07:41

## 2024-02-04 RX ADMIN — OXYCODONE HYDROCHLORIDE 10 MG: 10 TABLET ORAL at 06:08

## 2024-02-04 RX ADMIN — CARVEDILOL 3.12 MG: 3.12 TABLET, FILM COATED ORAL at 16:18

## 2024-02-04 RX ADMIN — BACLOFEN 10 MG: 10 TABLET ORAL at 21:39

## 2024-02-04 RX ADMIN — ACETAMINOPHEN 1000 MG: 500 TABLET ORAL at 06:09

## 2024-02-04 RX ADMIN — Medication 400 MG: at 07:41

## 2024-02-04 RX ADMIN — CARVEDILOL 3.12 MG: 3.12 TABLET, FILM COATED ORAL at 07:41

## 2024-02-04 RX ADMIN — BACLOFEN 10 MG: 10 TABLET ORAL at 13:39

## 2024-02-04 RX ADMIN — DICLOFENAC SODIUM 4 G: 10 GEL TOPICAL at 13:40

## 2024-02-04 RX ADMIN — PANTOPRAZOLE SODIUM 40 MG: 40 TABLET, DELAYED RELEASE ORAL at 16:18

## 2024-02-04 RX ADMIN — PHENYTOIN SODIUM 100 MG: 100 CAPSULE ORAL at 07:40

## 2024-02-04 RX ADMIN — LAMOTRIGINE 200 MG: 100 TABLET ORAL at 07:40

## 2024-02-04 RX ADMIN — OXYCODONE 5 MG: 5 TABLET ORAL at 12:22

## 2024-02-04 RX ADMIN — PANTOPRAZOLE SODIUM 40 MG: 40 TABLET, DELAYED RELEASE ORAL at 06:09

## 2024-02-04 RX ADMIN — TAMSULOSIN HYDROCHLORIDE 0.4 MG: 0.4 CAPSULE ORAL at 21:39

## 2024-02-04 RX ADMIN — SENNOSIDES AND DOCUSATE SODIUM 1 TABLET: 8.6; 5 TABLET ORAL at 21:39

## 2024-02-04 RX ADMIN — PHENYTOIN SODIUM 100 MG: 100 CAPSULE ORAL at 21:39

## 2024-02-04 RX ADMIN — ACETAMINOPHEN 1000 MG: 500 TABLET ORAL at 21:38

## 2024-02-04 RX ADMIN — ENOXAPARIN SODIUM 80 MG: 100 INJECTION SUBCUTANEOUS at 07:40

## 2024-02-04 ASSESSMENT — PAIN SCALES - GENERAL
PAINLEVEL_OUTOF10: 0
PAINLEVEL_OUTOF10: 4
PAINLEVEL_OUTOF10: 7
PAINLEVEL_OUTOF10: 0
PAINLEVEL_OUTOF10: 6
PAINLEVEL_OUTOF10: 5
PAINLEVEL_OUTOF10: 6
PAINLEVEL_OUTOF10: 5
PAINLEVEL_OUTOF10: 7
PAINLEVEL_OUTOF10: 5
PAINLEVEL_OUTOF10: 5
PAINLEVEL_OUTOF10: 6
PAINLEVEL_OUTOF10: 5
PAINLEVEL_OUTOF10: 5

## 2024-02-04 ASSESSMENT — PAIN - FUNCTIONAL ASSESSMENT
PAIN_FUNCTIONAL_ASSESSMENT: ACTIVITIES ARE NOT PREVENTED

## 2024-02-04 ASSESSMENT — PAIN DESCRIPTION - DESCRIPTORS
DESCRIPTORS: ACHING;DISCOMFORT;SORE
DESCRIPTORS: ACHING;DULL

## 2024-02-04 ASSESSMENT — PAIN DESCRIPTION - ORIENTATION
ORIENTATION: RIGHT

## 2024-02-04 ASSESSMENT — PAIN DESCRIPTION - LOCATION
LOCATION: HIP
LOCATION: HIP
LOCATION: GROIN;HIP
LOCATION: HIP
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
## 2024-02-04 NOTE — PLAN OF CARE
Problem: Discharge Planning  Goal: Discharge to home or other facility with appropriate resources  Outcome: Progressing  Flowsheets (Taken 2/3/2024 1008)  Discharge to home or other facility with appropriate resources:   Identify barriers to discharge with patient and caregiver   Identify discharge learning needs (meds, wound care, etc)   Arrange for needed discharge resources and transportation as appropriate     Problem: Safety - Adult  Goal: Free from fall injury  Outcome: Progressing  Flowsheets (Taken 2/2/2024 1032 by Martir Linares, RN)  Free From Fall Injury:   Instruct family/caregiver on patient safety   Based on caregiver fall risk screen, instruct family/caregiver to ask for assistance with transferring infant if caregiver noted to have fall risk factors     Problem: ABCDS Injury Assessment  Goal: Absence of physical injury  Outcome: Progressing  Flowsheets (Taken 2/2/2024 1032 by Martir Linares, RN)  Absence of Physical Injury: Implement safety measures based on patient assessment     Problem: Pain  Goal: Verbalizes/displays adequate comfort level or baseline comfort level  Outcome: Progressing  Flowsheets (Taken 2/4/2024 0955)  Verbalizes/displays adequate comfort level or baseline comfort level:   Encourage patient to monitor pain and request assistance   Administer analgesics based on type and severity of pain and evaluate response   Assess pain using appropriate pain scale   Implement non-pharmacological measures as appropriate and evaluate response     Problem: Nutrition Deficit:  Goal: Optimize nutritional status  Outcome: Progressing  Flowsheets (Taken 2/4/2024 0955)  Nutrient intake appropriate for improving, restoring, or maintaining nutritional needs:   Assess nutritional status and recommend course of action   Monitor oral intake, labs, and treatment plans     Problem: Skin/Tissue Integrity  Goal: Absence of new skin breakdown  Description: 1.  Monitor for areas of redness and/or skin

## 2024-02-04 NOTE — PROGRESS NOTES
Patient admitted to rehab with subarachnoid hemorrhage .  A/Ox4. Transfers with stedy x1. Mobility restrictions: WBAT R SIDE flaccid. On regular diet, tolerating well. Medications taken whole with thins. On lovenox for DVT prophylaxis.  Skin: redness to butt, scattered bruising, swelling 3+ to R hand, 2+RLE, 1+ LLE. Oxygen: RA. LDA: NONE. Has been continent of bowel and SC q6hrs of bladder. LBM 2/3. Chair/bed alarms in use and call light in reach. Will monitor for safety.

## 2024-02-05 LAB
ANION GAP SERPL CALCULATED.3IONS-SCNC: 8 MMOL/L (ref 3–16)
BASOPHILS # BLD: 0.1 K/UL (ref 0–0.2)
BASOPHILS NFR BLD: 1.4 %
BUN SERPL-MCNC: 14 MG/DL (ref 7–20)
CALCIUM SERPL-MCNC: 8.3 MG/DL (ref 8.3–10.6)
CHLORIDE SERPL-SCNC: 100 MMOL/L (ref 99–110)
CO2 SERPL-SCNC: 28 MMOL/L (ref 21–32)
CREAT SERPL-MCNC: 0.7 MG/DL (ref 0.8–1.3)
DEPRECATED RDW RBC AUTO: 14.4 % (ref 12.4–15.4)
EOSINOPHIL # BLD: 0.3 K/UL (ref 0–0.6)
EOSINOPHIL NFR BLD: 3.5 %
GFR SERPLBLD CREATININE-BSD FMLA CKD-EPI: >60 ML/MIN/{1.73_M2}
GLUCOSE SERPL-MCNC: 98 MG/DL (ref 70–99)
HCT VFR BLD AUTO: 24.9 % (ref 40.5–52.5)
HGB BLD-MCNC: 8.5 G/DL (ref 13.5–17.5)
LYMPHOCYTES # BLD: 2.3 K/UL (ref 1–5.1)
LYMPHOCYTES NFR BLD: 27 %
MCH RBC QN AUTO: 32.3 PG (ref 26–34)
MCHC RBC AUTO-ENTMCNC: 34.3 G/DL (ref 31–36)
MCV RBC AUTO: 94.2 FL (ref 80–100)
MONOCYTES # BLD: 0.6 K/UL (ref 0–1.3)
MONOCYTES NFR BLD: 6.7 %
NEUTROPHILS # BLD: 5.3 K/UL (ref 1.7–7.7)
NEUTROPHILS NFR BLD: 61.4 %
PLATELET # BLD AUTO: 413 K/UL (ref 135–450)
PMV BLD AUTO: 7.5 FL (ref 5–10.5)
POTASSIUM SERPL-SCNC: 5.1 MMOL/L (ref 3.5–5.1)
RBC # BLD AUTO: 2.65 M/UL (ref 4.2–5.9)
SODIUM SERPL-SCNC: 136 MMOL/L (ref 136–145)
WBC # BLD AUTO: 8.6 K/UL (ref 4–11)

## 2024-02-05 PROCEDURE — 97129 THER IVNTJ 1ST 15 MIN: CPT

## 2024-02-05 PROCEDURE — 97110 THERAPEUTIC EXERCISES: CPT

## 2024-02-05 PROCEDURE — 97530 THERAPEUTIC ACTIVITIES: CPT | Performed by: PHYSICAL THERAPIST

## 2024-02-05 PROCEDURE — 36415 COLL VENOUS BLD VENIPUNCTURE: CPT

## 2024-02-05 PROCEDURE — 1280000000 HC REHAB R&B

## 2024-02-05 PROCEDURE — 85025 COMPLETE CBC W/AUTO DIFF WBC: CPT

## 2024-02-05 PROCEDURE — 94760 N-INVAS EAR/PLS OXIMETRY 1: CPT

## 2024-02-05 PROCEDURE — 51798 US URINE CAPACITY MEASURE: CPT

## 2024-02-05 PROCEDURE — 6370000000 HC RX 637 (ALT 250 FOR IP): Performed by: PHYSICAL MEDICINE & REHABILITATION

## 2024-02-05 PROCEDURE — 51701 INSERT BLADDER CATHETER: CPT

## 2024-02-05 PROCEDURE — 97530 THERAPEUTIC ACTIVITIES: CPT

## 2024-02-05 PROCEDURE — 97116 GAIT TRAINING THERAPY: CPT | Performed by: PHYSICAL THERAPIST

## 2024-02-05 PROCEDURE — 6360000002 HC RX W HCPCS: Performed by: PHYSICAL MEDICINE & REHABILITATION

## 2024-02-05 PROCEDURE — 97535 SELF CARE MNGMENT TRAINING: CPT

## 2024-02-05 PROCEDURE — 97116 GAIT TRAINING THERAPY: CPT

## 2024-02-05 PROCEDURE — 97130 THER IVNTJ EA ADDL 15 MIN: CPT

## 2024-02-05 PROCEDURE — 80048 BASIC METABOLIC PNL TOTAL CA: CPT

## 2024-02-05 PROCEDURE — 6370000000 HC RX 637 (ALT 250 FOR IP): Performed by: STUDENT IN AN ORGANIZED HEALTH CARE EDUCATION/TRAINING PROGRAM

## 2024-02-05 RX ORDER — TRIAMCINOLONE ACETONIDE 1 MG/G
1 CREAM TOPICAL DAILY PRN
COMMUNITY

## 2024-02-05 RX ORDER — NALOXONE HYDROCHLORIDE 4 MG/.1ML
1 SPRAY NASAL PRN
COMMUNITY

## 2024-02-05 RX ORDER — POLYVINYL ALCOHOL 14 MG/ML
1-2 SOLUTION/ DROPS OPHTHALMIC PRN
COMMUNITY

## 2024-02-05 RX ORDER — ATORVASTATIN CALCIUM 80 MG/1
80 TABLET, FILM COATED ORAL NIGHTLY
Status: DISCONTINUED | OUTPATIENT
Start: 2024-02-05 | End: 2024-02-23 | Stop reason: HOSPADM

## 2024-02-05 RX ORDER — UREA 200 MG/G
1 GEL TOPICAL DAILY PRN
COMMUNITY

## 2024-02-05 RX ADMIN — PANTOPRAZOLE SODIUM 40 MG: 40 TABLET, DELAYED RELEASE ORAL at 06:01

## 2024-02-05 RX ADMIN — OXYCODONE HYDROCHLORIDE 10 MG: 10 TABLET, FILM COATED, EXTENDED RELEASE ORAL at 08:25

## 2024-02-05 RX ADMIN — LAMOTRIGINE 200 MG: 100 TABLET ORAL at 21:19

## 2024-02-05 RX ADMIN — PHENYTOIN SODIUM 100 MG: 100 CAPSULE ORAL at 14:34

## 2024-02-05 RX ADMIN — LAMOTRIGINE 200 MG: 100 TABLET ORAL at 08:25

## 2024-02-05 RX ADMIN — ENOXAPARIN SODIUM 80 MG: 100 INJECTION SUBCUTANEOUS at 21:23

## 2024-02-05 RX ADMIN — ACETAMINOPHEN 1000 MG: 500 TABLET ORAL at 14:34

## 2024-02-05 RX ADMIN — ACETAMINOPHEN 1000 MG: 500 TABLET ORAL at 06:01

## 2024-02-05 RX ADMIN — ATORVASTATIN CALCIUM 80 MG: 80 TABLET, FILM COATED ORAL at 20:00

## 2024-02-05 RX ADMIN — ACETAMINOPHEN 1000 MG: 500 TABLET ORAL at 21:23

## 2024-02-05 RX ADMIN — BACLOFEN 10 MG: 10 TABLET ORAL at 20:00

## 2024-02-05 RX ADMIN — CARVEDILOL 3.12 MG: 3.12 TABLET, FILM COATED ORAL at 08:27

## 2024-02-05 RX ADMIN — OXYCODONE HYDROCHLORIDE 10 MG: 10 TABLET ORAL at 12:21

## 2024-02-05 RX ADMIN — SENNOSIDES AND DOCUSATE SODIUM 1 TABLET: 8.6; 5 TABLET ORAL at 08:25

## 2024-02-05 RX ADMIN — Medication 400 MG: at 08:24

## 2024-02-05 RX ADMIN — PANTOPRAZOLE SODIUM 40 MG: 40 TABLET, DELAYED RELEASE ORAL at 14:34

## 2024-02-05 RX ADMIN — ENOXAPARIN SODIUM 80 MG: 100 INJECTION SUBCUTANEOUS at 08:24

## 2024-02-05 RX ADMIN — SENNOSIDES AND DOCUSATE SODIUM 1 TABLET: 8.6; 5 TABLET ORAL at 20:00

## 2024-02-05 RX ADMIN — Medication 3 MG: at 21:23

## 2024-02-05 RX ADMIN — CARVEDILOL 3.12 MG: 3.12 TABLET, FILM COATED ORAL at 18:38

## 2024-02-05 RX ADMIN — OXYCODONE HYDROCHLORIDE 10 MG: 10 TABLET, FILM COATED, EXTENDED RELEASE ORAL at 21:22

## 2024-02-05 RX ADMIN — DICLOFENAC SODIUM 4 G: 10 GEL TOPICAL at 20:00

## 2024-02-05 RX ADMIN — PHENYTOIN SODIUM 100 MG: 100 CAPSULE ORAL at 08:25

## 2024-02-05 RX ADMIN — BACLOFEN 10 MG: 10 TABLET ORAL at 14:34

## 2024-02-05 RX ADMIN — TAMSULOSIN HYDROCHLORIDE 0.4 MG: 0.4 CAPSULE ORAL at 20:00

## 2024-02-05 RX ADMIN — BACLOFEN 10 MG: 10 TABLET ORAL at 08:25

## 2024-02-05 RX ADMIN — OXYCODONE HYDROCHLORIDE 10 MG: 10 TABLET ORAL at 06:02

## 2024-02-05 RX ADMIN — PHENYTOIN SODIUM 100 MG: 100 CAPSULE ORAL at 21:18

## 2024-02-05 ASSESSMENT — PAIN DESCRIPTION - DESCRIPTORS
DESCRIPTORS: ACHING
DESCRIPTORS: ACHING
DESCRIPTORS: ACHING;DISCOMFORT
DESCRIPTORS: ACHING;DISCOMFORT;SORE
DESCRIPTORS: ACHING;DISCOMFORT
DESCRIPTORS: ACHING
DESCRIPTORS: ACHING;DISCOMFORT;SORE

## 2024-02-05 ASSESSMENT — PAIN DESCRIPTION - ONSET
ONSET: ON-GOING

## 2024-02-05 ASSESSMENT — PAIN SCALES - GENERAL
PAINLEVEL_OUTOF10: 7
PAINLEVEL_OUTOF10: 7
PAINLEVEL_OUTOF10: 6
PAINLEVEL_OUTOF10: 0
PAINLEVEL_OUTOF10: 5
PAINLEVEL_OUTOF10: 6
PAINLEVEL_OUTOF10: 7
PAINLEVEL_OUTOF10: 4
PAINLEVEL_OUTOF10: 7
PAINLEVEL_OUTOF10: 6
PAINLEVEL_OUTOF10: 7
PAINLEVEL_OUTOF10: 0

## 2024-02-05 ASSESSMENT — PAIN SCALES - WONG BAKER
WONGBAKER_NUMERICALRESPONSE: 2
WONGBAKER_NUMERICALRESPONSE: 2

## 2024-02-05 ASSESSMENT — PAIN DESCRIPTION - ORIENTATION
ORIENTATION: RIGHT

## 2024-02-05 ASSESSMENT — PAIN - FUNCTIONAL ASSESSMENT
PAIN_FUNCTIONAL_ASSESSMENT: PREVENTS OR INTERFERES WITH ALL ACTIVE AND SOME PASSIVE ACTIVITIES
PAIN_FUNCTIONAL_ASSESSMENT: ACTIVITIES ARE NOT PREVENTED
PAIN_FUNCTIONAL_ASSESSMENT: ACTIVITIES ARE NOT PREVENTED
PAIN_FUNCTIONAL_ASSESSMENT: PREVENTS OR INTERFERES WITH MANY ACTIVE NOT PASSIVE ACTIVITIES
PAIN_FUNCTIONAL_ASSESSMENT: ACTIVITIES ARE NOT PREVENTED

## 2024-02-05 ASSESSMENT — PAIN DESCRIPTION - PAIN TYPE
TYPE: CHRONIC PAIN

## 2024-02-05 ASSESSMENT — PAIN DESCRIPTION - LOCATION
LOCATION: LEG;HIP;GROIN
LOCATION: HIP;GROIN
LOCATION: HIP
LOCATION: GROIN;HIP
LOCATION: GROIN;HIP
LOCATION: HIP;GROIN

## 2024-02-05 ASSESSMENT — PAIN DESCRIPTION - FREQUENCY
FREQUENCY: INTERMITTENT

## 2024-02-05 NOTE — PROGRESS NOTES
Physical Therapy  Facility/Department: [address] [facility]  Rehabilitation Physical Therapy Treatment Note    NAME: Eric Ovalles  : 1945 (78 y.o.)  MRN: [id number]  CODE STATUS: Full Code    Date of Service: 24       Restrictions:  Restrictions/Precautions: Aspiration Risk, Fall Risk  Position Activity Restriction  Other position/activity restrictions: intermittant catheterization as PTA     SUBJECTIVE  Subjective  Subjective: pt reports feeling well this morning  Pain: pt with P hip and groin pain 5/10 with standing this morning     OBJECTIVE  Cognition  Overall Cognitive Status: Exceptions  Following Commands: Follows multistep commands with increased time;Follows multistep commands with repitition  Attention Span: Appears intact  Safety Judgement: Decreased awareness of need for safety  Problem Solving: Assistance required to implement solutions;Assistance required to generate solutions  Cognition Comment: seems [place], slow processing at times  Orientation  Overall Orientation Status: Within Functional Limits    Functional Mobility  Bed Mobility  Overall Assistance Level: Moderate Assistance  Bridging  Assistance Level: Minimal assistance  Roll Left  Assistance Level:  (unable due to pain on R hip/groin)  Roll Right  Assistance Level: Stand by assist  Sit to Supine  Assistance Level: Minimal assistance  Skilled Clinical Factors: with R LE and trunk  Supine to Sit  Assistance Level: Moderate assistance  Skilled Clinical Factors: with trunk and R LE  Scooting  Assistance Level: Minimal assistance  Balance  Sitting Balance: Supervision  Standing Balance: Contact guard assistance  Sit to Stand  Assistance Level: Minimal assistance;Moderate assistance  Skilled Clinical Factors: stand to LBQC, initially min A, pt fatigued nad needed mod A with stand by the end of the session  Stand to Sit  Assistance Level: Minimal assistance  Stand Pivot  Assistance Level: Minimal assistance;Moderate assistance  Car   minutes of therapy at least 5 out of 7 days a week  Days Per Week: 5 Days  Hours Per Day: 1.5 hours  Therapy Duration: 2 Weeks  Current Treatment Recommendations: Strengthening;Balance training;Functional mobility training;Transfer training;Endurance training;Gait training;Stair training;Neuromuscular re-education;Home exercise program;Safety education & training;Patient/Caregiver education & training;Equipment evaluation, education, & procurement;Therapeutic activities  Safety Devices  Type of Devices: All fall risk precautions in place;Gait belt    EDUCATION           Therapy Time   Individual Concurrent Group Co-treatment   Time In 1000         Time Out 1045         Minutes 45           Timed Code Treatment Minutes: 45 Minutes       Angy Marquez PT, 02/05/24 at 12:04 PM
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
## 2024-02-05 NOTE — PROGRESS NOTES
Physical Therapy  Facility/Department: [address] [facility]  Rehabilitation Physical Therapy Treatment Note    NAME: Eric Ovalles  : 1945 (78 y.o.)  MRN: [id number]  CODE STATUS: Full Code    Date of Service: 24       Restrictions:  Restrictions/Precautions: Aspiration Risk, Fall Risk  Position Activity Restriction  Other position/activity restrictions: intermittant catheterization as PTA     SUBJECTIVE  Subjective  Subjective: Pt reports ready yo do stairs this PM but the curb was difficult for him.    OBJECTIVE  Cognition  Overall Cognitive Status: Exceptions  Following Commands: Follows multistep commands with increased time;Follows multistep commands with repitition  Attention Span: Appears intact  Safety Judgement: Decreased awareness of need for safety  Problem Solving: Assistance required to implement solutions;Assistance required to generate solutions  Cognition Comment: seems [place], slow processing at times  Orientation  Overall Orientation Status: Within Functional Limits    Functional Mobility  Balance  Standing Balance: Contact guard assistance  Transfers  Additional Factors: Increased time to complete  Device: Cane (Quad cane)  Sit to Stand  Assistance Level: Minimal assistance  Skilled Clinical Factors: stand to LBQC  Stand to Sit  Assistance Level: Minimal assistance  Skilled Clinical Factors: Uncontrolled descent      Environmental Mobility  Ambulation  Surface: Level surface (with one surface transition)  Device: Quad Cane (LBQC L)  Distance: 50'  Activity: Within Unit  Additional Factors: Right AFO;Set-up;Verbal cues;Increased time to complete  Assistance Level: Minimal assistance (w/c follow)  Gait Deviations: Slow butch;Decreased step length bilateral;Decreased weight shift right;Narrow base of support;Unsteady gait  Stairs  Stair Height: 6''  Device: Quad Cane  Number of Stairs: 4  Additional Factors: Set-up;Verbal cues;Right AFO;Non-reciprocal going up;Non-reciprocal going  up/down curb with min A  Long Term Goals  Time Frame for Long Term Goals : 2 weeks  Long Term Goal 1: Bed mobility with MI  Long Term Goal 2: Transfers with MI  Long Term Goal 3: Amb 100' with MI LBQC  Long Term Goal 4: Amb up/down 4 steps with CGA  Long Term Goal 5: Amb up/down curb with CGA    PLAN OF CARE/SAFETY  Physical Therapy Plan  General Plan:  minutes of therapy at least 5 out of 7 days a week  Days Per Week: 5 Days  Hours Per Day: 1.5 hours  Therapy Duration: 2 Weeks  Current Treatment Recommendations: Strengthening;Balance training;Functional mobility training;Transfer training;Endurance training;Gait training;Stair training;Neuromuscular re-education;Home exercise program;Safety education & training;Patient/Caregiver education & training;Equipment evaluation, education, & procurement;Therapeutic activities  Safety Devices  Type of Devices: All fall risk precautions in place;Gait belt;Patient at risk for falls;Left in chair (To room with wife)  Restraints  Restraints Initially in Place: No    EDUCATION  Education  Education Given To: Patient;Family  Education Provided: Plan of Care;Safety;Mobility Training;Transfer Training;Fall Prevention Strategies  Education Method: Demonstration;Verbal  Education Outcome: Verbalized understanding;Demonstrated understanding;Continued education needed        Therapy Time   Individual Concurrent Group Co-treatment   Time In 1530         Time Out 1600         Minutes 30           Timed Code Treatment Minutes: 30 Minutes       CELIA HARRISON PT, 02/05/24 at 4:24 PM     Electronically signed by CELIA HARRISON PT on 2/5/24 at 4:24 PM EST

## 2024-02-05 NOTE — PLAN OF CARE
Problem: Discharge Planning  Goal: Discharge to home or other facility with appropriate resources  Outcome: Progressing     Problem: Safety - Adult  Goal: Free from fall injury  Outcome: Progressing     Problem: ABCDS Injury Assessment  Goal: Absence of physical injury  Outcome: Progressing     Problem: Pain  Goal: Verbalizes/displays adequate comfort level or baseline comfort level  Outcome: Progressing     Problem: Nutrition Deficit:  Goal: Optimize nutritional status  Outcome: Progressing     Problem: Skin/Tissue Integrity  Goal: Absence of new skin breakdown  Description: 1.  Monitor for areas of redness and/or skin breakdown  2.  Assess vascular access sites hourly  3.  Every 4-6 hours minimum:  Change oxygen saturation probe site  4.  Every 4-6 hours:  If on nasal continuous positive airway pressure, respiratory therapy assess nares and determine need for appliance change or resting period.  Outcome: Progressing     Problem: Musculoskeletal - Adult  Goal: Return mobility to safest level of function  Outcome: Progressing  Goal: Maintain proper alignment of affected body part  Outcome: Progressing  Goal: Return ADL status to a safe level of function  Outcome: Progressing     Problem: Genitourinary - Adult  Goal: Absence of urinary retention  Outcome: Progressing
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
## 2024-02-05 NOTE — PATIENT CARE CONFERENCE
[facility]  Inpatient Rehabilitation  Weekly Team Conference Note      Date: 2024  Patient Name:  Eric Ovalles    MRN: [id number]  : 1945  Gender: Male  Physician: Dr. Marie   Diagnosis: Subarachnoid hemorrhage (HCC) [I60.9]    CASE MANAGEMENT  Assessment: Goal is home with wife, agreeable to home care services.       PHYSICAL THERAPY    Bed Mobility:  Overall Assistance Level: Moderate Assistance  Sit>supine:  Assistance Level: Minimal assistance  Skilled Clinical Factors: with R LE and trunk  Supine>sit:  Assistance Level: Moderate assistance  Skilled Clinical Factors: with trunk and R LE    Transfers:  Additional Factors: Increased time to complete  Device: Cane (Quad cane)  Sit>stand:  Assistance Level: Minimal assistance  Skilled Clinical Factors: stand to LBQC  Stand>sit:  Assistance Level: Minimal assistance  Skilled Clinical Factors: Uncontrolled descent  Bed<>chair  Skilled Clinical Factors: used tanisha stedy because pt threw up right before transfer and pt feeling weak  Stand Pivot:  Assistance Level: Minimal assistance, Moderate assistance  Skilled Clinical Factors: to his L  Lateral transfer:     Car transfer:  Assistance Level: Minimal assistance, Moderate assistance    Ambulation:  Surface: Level surface (with one surface transition)  Device: Quad Cane (LBQC L)  Distance: 50'  Activity: Within Unit  Additional Factors: Right AFO, Set-up, Verbal cues, Increased time to complete  Assistance Level: Minimal assistance (w/c follow)  Gait Deviations: Slow butch, Decreased step length bilateral, Decreased weight shift right, Narrow base of support, Unsteady gait    Stairs:  Stair Height: 6''  Device: Quad Cane  Number of Stairs: 4  Additional Factors: Set-up, Verbal cues, Right AFO, Non-reciprocal going up, Non-reciprocal going down, Increased time to complete  Assistance Level: Minimal assistance, Moderate assistance  Skilled Clinical Factors: Steps up with LLE and down

## 2024-02-05 NOTE — PROGRESS NOTES
Patient admitted to rehab with Subarachnoid Hemorrhage.  A/Ox4. Transfers with stedy and gait belt x1-2. Mobility restrictions: right hip pain, right side flaccid. On Regular diet, tolerating well. Medications taken whole with thin liquids. On Lovenox for DVT prophylaxis.  Skin: Reddened coccyx, resolving . Oxygen: RA. LDA: none. Has been incontinent of bowel and continent of bladder. LBM 2/5/2024, continuing to take senokot. Chair/bed alarms in use and call light in reach. Will monitor for safety. Electronically signed by Juliana Montano RN on 2/5/2024 at 1:48 PM
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
## 2024-02-05 NOTE — PROGRESS NOTES
Pharmacy Medication Reconciliation Note     List of medications patient is currently taking is complete.    Source of information:   1. Patient supplied med list  2. Discharge instructions from  Health    Notes regarding home medications:   1. Changed Atorvastatin to 80mg daily    2. UC started Carvedilol 6.25mg bid - looks like they did not know patient was on Metoprolol 100mg bid PTA  3. UC started Flomax 0.4mg QHS- looks like they did not know patient on Terazosin 5mg QHS PTA.   4. Removed Meloxicam & Diazepam from med list  5. Added Artificial Saliva & Tears, Voltaren gel,Naloxone nasal,Triamcinolone & Urea cream    Denies taking any other OTC or herbal medications    Edu Josue, BETY, RP 2/5/2024 2:01 PM
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
## 2024-02-06 PROCEDURE — 6370000000 HC RX 637 (ALT 250 FOR IP): Performed by: PHYSICAL MEDICINE & REHABILITATION

## 2024-02-06 PROCEDURE — 1280000000 HC REHAB R&B

## 2024-02-06 PROCEDURE — 6370000000 HC RX 637 (ALT 250 FOR IP): Performed by: STUDENT IN AN ORGANIZED HEALTH CARE EDUCATION/TRAINING PROGRAM

## 2024-02-06 PROCEDURE — 97110 THERAPEUTIC EXERCISES: CPT

## 2024-02-06 PROCEDURE — 97530 THERAPEUTIC ACTIVITIES: CPT

## 2024-02-06 PROCEDURE — 51701 INSERT BLADDER CATHETER: CPT

## 2024-02-06 PROCEDURE — 6360000002 HC RX W HCPCS: Performed by: PHYSICAL MEDICINE & REHABILITATION

## 2024-02-06 PROCEDURE — 51798 US URINE CAPACITY MEASURE: CPT

## 2024-02-06 PROCEDURE — 97116 GAIT TRAINING THERAPY: CPT

## 2024-02-06 PROCEDURE — 97542 WHEELCHAIR MNGMENT TRAINING: CPT

## 2024-02-06 PROCEDURE — 94760 N-INVAS EAR/PLS OXIMETRY 1: CPT

## 2024-02-06 PROCEDURE — 97535 SELF CARE MNGMENT TRAINING: CPT

## 2024-02-06 RX ADMIN — OXYCODONE HYDROCHLORIDE 10 MG: 10 TABLET ORAL at 10:42

## 2024-02-06 RX ADMIN — PHENYTOIN SODIUM 100 MG: 100 CAPSULE ORAL at 09:19

## 2024-02-06 RX ADMIN — DICLOFENAC SODIUM 4 G: 10 GEL TOPICAL at 19:17

## 2024-02-06 RX ADMIN — LAMOTRIGINE 200 MG: 100 TABLET ORAL at 21:07

## 2024-02-06 RX ADMIN — DICLOFENAC SODIUM 4 G: 10 GEL TOPICAL at 18:28

## 2024-02-06 RX ADMIN — PANTOPRAZOLE SODIUM 40 MG: 40 TABLET, DELAYED RELEASE ORAL at 18:28

## 2024-02-06 RX ADMIN — LAMOTRIGINE 200 MG: 100 TABLET ORAL at 09:19

## 2024-02-06 RX ADMIN — BACLOFEN 10 MG: 10 TABLET ORAL at 14:22

## 2024-02-06 RX ADMIN — OXYCODONE HYDROCHLORIDE 10 MG: 10 TABLET, FILM COATED, EXTENDED RELEASE ORAL at 20:00

## 2024-02-06 RX ADMIN — ACETAMINOPHEN 1000 MG: 500 TABLET ORAL at 14:22

## 2024-02-06 RX ADMIN — ATORVASTATIN CALCIUM 80 MG: 80 TABLET, FILM COATED ORAL at 19:58

## 2024-02-06 RX ADMIN — OXYCODONE HYDROCHLORIDE 10 MG: 10 TABLET, FILM COATED, EXTENDED RELEASE ORAL at 09:19

## 2024-02-06 RX ADMIN — TAMSULOSIN HYDROCHLORIDE 0.4 MG: 0.4 CAPSULE ORAL at 19:58

## 2024-02-06 RX ADMIN — ACETAMINOPHEN 1000 MG: 500 TABLET ORAL at 04:53

## 2024-02-06 RX ADMIN — PHENYTOIN SODIUM 100 MG: 100 CAPSULE ORAL at 14:22

## 2024-02-06 RX ADMIN — PHENYTOIN SODIUM 100 MG: 100 CAPSULE ORAL at 21:07

## 2024-02-06 RX ADMIN — CARVEDILOL 3.12 MG: 3.12 TABLET, FILM COATED ORAL at 18:28

## 2024-02-06 RX ADMIN — ENOXAPARIN SODIUM 80 MG: 100 INJECTION SUBCUTANEOUS at 21:06

## 2024-02-06 RX ADMIN — ACETAMINOPHEN 1000 MG: 500 TABLET ORAL at 21:06

## 2024-02-06 RX ADMIN — CARVEDILOL 3.12 MG: 3.12 TABLET, FILM COATED ORAL at 09:19

## 2024-02-06 RX ADMIN — SENNOSIDES AND DOCUSATE SODIUM 1 TABLET: 8.6; 5 TABLET ORAL at 09:19

## 2024-02-06 RX ADMIN — BACLOFEN 10 MG: 10 TABLET ORAL at 19:59

## 2024-02-06 RX ADMIN — ENOXAPARIN SODIUM 80 MG: 100 INJECTION SUBCUTANEOUS at 09:18

## 2024-02-06 RX ADMIN — PANTOPRAZOLE SODIUM 40 MG: 40 TABLET, DELAYED RELEASE ORAL at 05:00

## 2024-02-06 RX ADMIN — BACLOFEN 10 MG: 10 TABLET ORAL at 09:19

## 2024-02-06 RX ADMIN — Medication 400 MG: at 09:19

## 2024-02-06 RX ADMIN — SENNOSIDES AND DOCUSATE SODIUM 1 TABLET: 8.6; 5 TABLET ORAL at 19:58

## 2024-02-06 RX ADMIN — DICLOFENAC SODIUM 4 G: 10 GEL TOPICAL at 09:21

## 2024-02-06 RX ADMIN — DICLOFENAC SODIUM 4 G: 10 GEL TOPICAL at 14:08

## 2024-02-06 ASSESSMENT — PAIN - FUNCTIONAL ASSESSMENT
PAIN_FUNCTIONAL_ASSESSMENT: ACTIVITIES ARE NOT PREVENTED
PAIN_FUNCTIONAL_ASSESSMENT: ACTIVITIES ARE NOT PREVENTED
PAIN_FUNCTIONAL_ASSESSMENT: PREVENTS OR INTERFERES WITH MANY ACTIVE NOT PASSIVE ACTIVITIES
PAIN_FUNCTIONAL_ASSESSMENT: PREVENTS OR INTERFERES WITH ALL ACTIVE AND SOME PASSIVE ACTIVITIES
PAIN_FUNCTIONAL_ASSESSMENT: PREVENTS OR INTERFERES WITH MANY ACTIVE NOT PASSIVE ACTIVITIES
PAIN_FUNCTIONAL_ASSESSMENT: ACTIVITIES ARE NOT PREVENTED

## 2024-02-06 ASSESSMENT — PAIN SCALES - WONG BAKER
WONGBAKER_NUMERICALRESPONSE: 2
WONGBAKER_NUMERICALRESPONSE: 0
WONGBAKER_NUMERICALRESPONSE: 2
WONGBAKER_NUMERICALRESPONSE: 0
WONGBAKER_NUMERICALRESPONSE: 0

## 2024-02-06 ASSESSMENT — PAIN DESCRIPTION - DESCRIPTORS
DESCRIPTORS: ACHING;DISCOMFORT
DESCRIPTORS: ACHING
DESCRIPTORS: ACHING;DISCOMFORT
DESCRIPTORS: ACHING
DESCRIPTORS: ACHING
DESCRIPTORS: ACHING;DISCOMFORT

## 2024-02-06 ASSESSMENT — PAIN DESCRIPTION - PAIN TYPE
TYPE: CHRONIC PAIN

## 2024-02-06 ASSESSMENT — PAIN SCALES - GENERAL
PAINLEVEL_OUTOF10: 4
PAINLEVEL_OUTOF10: 6
PAINLEVEL_OUTOF10: 4
PAINLEVEL_OUTOF10: 6
PAINLEVEL_OUTOF10: 7
PAINLEVEL_OUTOF10: 6
PAINLEVEL_OUTOF10: 7
PAINLEVEL_OUTOF10: 0
PAINLEVEL_OUTOF10: 5

## 2024-02-06 ASSESSMENT — PAIN DESCRIPTION - LOCATION
LOCATION: GROIN;HIP;LEG
LOCATION: HIP;GROIN;LEG
LOCATION: LEG;ARM
LOCATION: HIP;LEG;ARM
LOCATION: LEG;GROIN;HIP
LOCATION: HIP;LEG
LOCATION: LEG;ARM

## 2024-02-06 ASSESSMENT — PAIN DESCRIPTION - FREQUENCY
FREQUENCY: INTERMITTENT

## 2024-02-06 ASSESSMENT — PAIN DESCRIPTION - ONSET
ONSET: ON-GOING

## 2024-02-06 ASSESSMENT — PAIN DESCRIPTION - ORIENTATION
ORIENTATION: RIGHT

## 2024-02-06 NOTE — FLOWSHEET NOTE
Patient admitted to ARU on 1/26 with dx of Subarachnoid Hemorrhage after a mechanical fall.  A/Ox4. Transfers with stedy and gait belt x1-2. Mobility restrictions: right hip pain, right side flaccid. On Regular diet, tolerating well. Medications taken whole with thin liquids. On Lovenox for DVT prophylaxis.  Skin: Reddened coccyx, resolving . Oxygen: RA. LDA: none. Has been incontinent of bowel. Has urinary retention, on routine straight cath which is his baseline at home . LBM 2/5/2024, continuing to take senokot. Chair/bed alarms in use and call light in reach.  Prefers to stay up in his w/c for comfort.

## 2024-02-06 NOTE — CARE COORDINATION
Called to  to look at his follow ups.    An appt for today was rescheduled and now has been discontinued on their end.  The appt was for Andrés Toledo CNP but they decided he needs to see Dr Jayson Bueno only.  This was scheduled for 3- at 11:00 am.    He does not need to see Andrés Toledo now that he is following up with Dr Bueno.    NICK Milner     Case Management   [phone]    2/6/2024  1:19 PM

## 2024-02-06 NOTE — PROGRESS NOTES
Department of Physical Medicine & Rehabilitation  Progress Note    Patient Identification:  Eric Ovalles  [id number]  : 1945  Admit date: 2024    Chief Complaint: Subarachnoid hemorrhage (HCC)    Subjective:   Seen in his room this morning. No new complaints overnight. He is going to need HH at home but needs to be mod I before discharge. Slept well last night but still needing help with transfers.   Team conference today.     ROS: No f/c, n/v, cp     Objective:  Patient Vitals for the past 24 hrs:   BP Temp Temp src Pulse Resp SpO2   24 1042 -- -- -- -- 18 --   24 0949 -- -- -- -- 18 --   24 0908 123/67 97.2 °F (36.2 °C) Oral 72 18 96 %   24 0826 -- -- -- -- 18 96 %   24 -- -- -- -- 18 --   24 (!) 146/69 97.9 °F (36.6 °C) Oral 83 18 96 %   24 (!) 144/68 98.3 °F (36.8 °C) Oral 86 18 96 %   24 1838 129/65 -- -- 89 -- --   24 1251 -- -- -- -- 18 --       Const: Alert. No distress, pleasant.   HEENT: Normocephalic, atraumatic. Normal sclera/conjunctiva. MMM.   CV: Regular rate and rhythm.   Resp: No respiratory distress. Lungs distant/diminished at bases   Abd: Soft, nontender, nondistended, NABS+   Ext: +RUE/RLE edema  MSK: right hip ROM limited   Neuro: Alert, oriented, appropriately interactive. Mildly impaired recall.   Psych: Cooperative, appropriate mood and affect    Laboratory data: Available via EMR.   Last 24 hour lab  No results found for this or any previous visit (from the past 24 hour(s)).          Therapy progress:  Physical therapy:  Bed Mobility:  Overall Assistance Level: Moderate Assistance  Sit>supine:  Assistance Level: Minimal assistance  Skilled Clinical Factors: with R LE and trunk  Supine>sit:  Assistance Level: Moderate assistance  Skilled Clinical Factors: with trunk and R LE  Transfers:  Additional Factors: Increased time to complete  Device: Cane (LBQC)  Sit>stand:  Assistance Level: Moderate  respiratory failure -- resolving  -As evidenced by O2 saturation <90% on room air  -Supplemental O2, wean as tolerated  -- weaned to room air (1/29)  -Treating PEs as above     BOB  -Due to urinary retention, improved with catheterization  -Avoid nephrotoxins, renally dose meds  -Monitor renal function  -continue ISC program as below     Esophagitis  -s/p EGD at  (1/23): LA Grade D esophagitis, hematin in gastric fundus, duodenitis  -s/p manometry study: ineffective esophageal motility  -f/u GI for biopsy results  -Plan for repeat EGD 8 weeks  -continue ppi BID     CAD, HTN  -s/p stents in 1990s  -continue atorvastatin, carvedilol (decreased dose due to soft BPs -- trend improved)  -ASA on hold until cleared by Ns[facility]    Severe protein calorie malnutrition  -Dietitian consult  -Supplements and education    Hypokalemia  -Improved with replacement, now holding supplement    Hypomagnesemia  -continue supplement    R groin pain  -Suspect hip flexor strain based on exam.   -Xray R hip negative for fracture/dislocation.  -continue pain control as below, diclofenc gel, ice  -PT/OT     Chronic Pain   -continue Oxycontin, prn oxycodone (increased dose/frequency temporarily in setting of acute pain)  -continue baclofen   -started diclofenac gel    Nausea/vomiting (1/31).   -Resolved with improvement in constipation. Continue bowel regimen as below.   -Ineffective esophageal motility likely contributing.   -LFTs, lipase wnl     Neurogenic Bladder: chronic urinary retention on ISC program at baseline  -Intermittent straight cath scheduled Q6 hours  -continue Flomax  -consider resuming home oxybutynin if having symptoms     Neurogenic Bowel: constipation  -senna+colace BID, PRN miralax, MoM, and bisacodyl supp.     Safety   -fall and aspiration precautions     PPx  -DVT: therapeutic lovenox for PEs as above  -GI: pantoprazole    Rehab Progress: Making gradual progress. Overall Karina for transfers and ambulation, SBA for  wheelchair propulsion, Up to maxA for ADLs. Barriers include: pain, baseline right hemiparesis and sensory deficit, endurance, cognition.    Anticipated Dispo: home with spouse  Services: HH PT, OT, RN, ? SLP  DME: JAZZMINE  ELOS: 2/16      Team conference was held today on the patient and discussed directly with the patient utilizing their entire treatment team. Please see separate team note for details. Total treatment time for today's care >50 min. >50% of time spent counseling with patient and coordinating care.     Carlos Payne D.O. M.P.H  PM&R  2/6/2024  12:45 PM
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
## 2024-02-06 NOTE — PROGRESS NOTES
Patient admitted to rehab with Subarachnoid Hemorrhage.  A/Ox4. Transfers with stedy and gait belt x1-2. Mobility restrictions: right hip pain, right side flaccid. On Regular diet, tolerating well. Medications taken whole with thin liquids. On Lovenox for DVT prophylaxis.  Skin: Reddened coccyx, resolving . Oxygen: RA. LDA: none. Has been continent of bowel and continent of bladder, straight cath every 6 hours. LBM 2/5/2024, continuing to take senokot. Chair/bed alarms in use and call light in reach. Will monitor for safety. Electronically signed by Juliana Montano RN on 2/6/2024 at 5:34 PM
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
## 2024-02-06 NOTE — PLAN OF CARE
Problem: Discharge Planning  Goal: Discharge to home or other facility with appropriate resources  2/5/2024 1933 by Silvana De Jesus RN  Outcome: Progressing  2/5/2024 1343 by Juliana Montano RN  Outcome: Progressing     Problem: Safety - Adult  Goal: Free from fall injury  2/5/2024 1933 by Silvana De Jesus RN  Outcome: Progressing  2/5/2024 1343 by Juliana Montano RN  Outcome: Progressing     Problem: ABCDS Injury Assessment  Goal: Absence of physical injury  2/5/2024 1933 by Silvana De Jesus RN  Outcome: Progressing  2/5/2024 1343 by Juliana Montano RN  Outcome: Progressing     Problem: Pain  Goal: Verbalizes/displays adequate comfort level or baseline comfort level  2/5/2024 1933 by Silvana De Jesus RN  Outcome: Progressing  2/5/2024 1343 by Juliana Montano RN  Outcome: Progressing     Problem: Nutrition Deficit:  Goal: Optimize nutritional status  2/5/2024 1933 by Silvana De Jesus RN  Outcome: Progressing  2/5/2024 1343 by Juliana Montano RN  Outcome: Progressing     Problem: Skin/Tissue Integrity  Goal: Absence of new skin breakdown  Description: 1.  Monitor for areas of redness and/or skin breakdown  2.  Assess vascular access sites hourly  3.  Every 4-6 hours minimum:  Change oxygen saturation probe site  4.  Every 4-6 hours:  If on nasal continuous positive airway pressure, respiratory therapy assess nares and determine need for appliance change or resting period.  2/5/2024 1933 by Silvana De Jesus RN  Outcome: Progressing  2/5/2024 1343 by Juliana Montano RN  Outcome: Progressing     Problem: Musculoskeletal - Adult  Goal: Return mobility to safest level of function  2/5/2024 1933 by Silvana De Jesus RN  Outcome: Progressing  2/5/2024 1343 by Juliana Montano RN  Outcome: Progressing  Goal: Maintain proper alignment of affected body part  2/5/2024 1933 by Silvana De Jesus RN  Outcome: Progressing  2/5/2024 1343 by Juliana Montano RN  Outcome:  Progressing  Goal: Return ADL status to a safe level of function  2/5/2024 1933 by Silvana De Jesus, RN  Outcome: Progressing  2/5/2024 1343 by Juliana Montano, RN  Outcome: Progressing     Problem: Genitourinary - Adult  Goal: Absence of urinary retention  2/5/2024 1933 by Silvana De Jesus, RN  Outcome: Progressing  2/5/2024 1343 by Juliana Montano, RN  Outcome: Progressing

## 2024-02-07 PROCEDURE — 51701 INSERT BLADDER CATHETER: CPT

## 2024-02-07 PROCEDURE — 97535 SELF CARE MNGMENT TRAINING: CPT

## 2024-02-07 PROCEDURE — 97542 WHEELCHAIR MNGMENT TRAINING: CPT

## 2024-02-07 PROCEDURE — 6370000000 HC RX 637 (ALT 250 FOR IP): Performed by: STUDENT IN AN ORGANIZED HEALTH CARE EDUCATION/TRAINING PROGRAM

## 2024-02-07 PROCEDURE — 97530 THERAPEUTIC ACTIVITIES: CPT

## 2024-02-07 PROCEDURE — 1280000000 HC REHAB R&B

## 2024-02-07 PROCEDURE — 51798 US URINE CAPACITY MEASURE: CPT

## 2024-02-07 PROCEDURE — 94760 N-INVAS EAR/PLS OXIMETRY 1: CPT

## 2024-02-07 PROCEDURE — 6360000002 HC RX W HCPCS: Performed by: PHYSICAL MEDICINE & REHABILITATION

## 2024-02-07 PROCEDURE — 97110 THERAPEUTIC EXERCISES: CPT

## 2024-02-07 PROCEDURE — 6370000000 HC RX 637 (ALT 250 FOR IP): Performed by: PHYSICAL MEDICINE & REHABILITATION

## 2024-02-07 PROCEDURE — 97116 GAIT TRAINING THERAPY: CPT

## 2024-02-07 RX ADMIN — DICLOFENAC SODIUM 4 G: 10 GEL TOPICAL at 08:15

## 2024-02-07 RX ADMIN — CARVEDILOL 3.12 MG: 3.12 TABLET, FILM COATED ORAL at 08:12

## 2024-02-07 RX ADMIN — ENOXAPARIN SODIUM 80 MG: 100 INJECTION SUBCUTANEOUS at 08:12

## 2024-02-07 RX ADMIN — DICLOFENAC SODIUM 4 G: 10 GEL TOPICAL at 20:53

## 2024-02-07 RX ADMIN — OXYCODONE HYDROCHLORIDE 10 MG: 10 TABLET, FILM COATED, EXTENDED RELEASE ORAL at 20:52

## 2024-02-07 RX ADMIN — ACETAMINOPHEN 1000 MG: 500 TABLET ORAL at 14:15

## 2024-02-07 RX ADMIN — ACETAMINOPHEN 1000 MG: 500 TABLET ORAL at 20:53

## 2024-02-07 RX ADMIN — BACLOFEN 10 MG: 10 TABLET ORAL at 14:15

## 2024-02-07 RX ADMIN — PHENYTOIN SODIUM 100 MG: 100 CAPSULE ORAL at 08:13

## 2024-02-07 RX ADMIN — SENNOSIDES AND DOCUSATE SODIUM 1 TABLET: 8.6; 5 TABLET ORAL at 08:12

## 2024-02-07 RX ADMIN — TAMSULOSIN HYDROCHLORIDE 0.4 MG: 0.4 CAPSULE ORAL at 20:52

## 2024-02-07 RX ADMIN — OXYCODONE HYDROCHLORIDE 10 MG: 10 TABLET, FILM COATED, EXTENDED RELEASE ORAL at 08:13

## 2024-02-07 RX ADMIN — PANTOPRAZOLE SODIUM 40 MG: 40 TABLET, DELAYED RELEASE ORAL at 08:14

## 2024-02-07 RX ADMIN — PANTOPRAZOLE SODIUM 40 MG: 40 TABLET, DELAYED RELEASE ORAL at 16:42

## 2024-02-07 RX ADMIN — ATORVASTATIN CALCIUM 80 MG: 80 TABLET, FILM COATED ORAL at 20:52

## 2024-02-07 RX ADMIN — Medication 400 MG: at 08:12

## 2024-02-07 RX ADMIN — LAMOTRIGINE 200 MG: 100 TABLET ORAL at 20:52

## 2024-02-07 RX ADMIN — BACLOFEN 10 MG: 10 TABLET ORAL at 08:13

## 2024-02-07 RX ADMIN — BACLOFEN 10 MG: 10 TABLET ORAL at 20:52

## 2024-02-07 RX ADMIN — ENOXAPARIN SODIUM 80 MG: 100 INJECTION SUBCUTANEOUS at 20:52

## 2024-02-07 RX ADMIN — LAMOTRIGINE 200 MG: 100 TABLET ORAL at 08:12

## 2024-02-07 RX ADMIN — Medication 3 MG: at 20:53

## 2024-02-07 RX ADMIN — CARVEDILOL 3.12 MG: 3.12 TABLET, FILM COATED ORAL at 16:41

## 2024-02-07 RX ADMIN — DICLOFENAC SODIUM 4 G: 10 GEL TOPICAL at 14:15

## 2024-02-07 RX ADMIN — PHENYTOIN SODIUM 100 MG: 100 CAPSULE ORAL at 14:15

## 2024-02-07 RX ADMIN — ACETAMINOPHEN 1000 MG: 500 TABLET ORAL at 04:16

## 2024-02-07 RX ADMIN — PHENYTOIN SODIUM 100 MG: 100 CAPSULE ORAL at 20:53

## 2024-02-07 ASSESSMENT — PAIN DESCRIPTION - FREQUENCY
FREQUENCY: INTERMITTENT
FREQUENCY: INTERMITTENT

## 2024-02-07 ASSESSMENT — PAIN DESCRIPTION - ORIENTATION
ORIENTATION: RIGHT

## 2024-02-07 ASSESSMENT — PAIN - FUNCTIONAL ASSESSMENT
PAIN_FUNCTIONAL_ASSESSMENT: PREVENTS OR INTERFERES WITH ALL ACTIVE AND SOME PASSIVE ACTIVITIES
PAIN_FUNCTIONAL_ASSESSMENT: PREVENTS OR INTERFERES SOME ACTIVE ACTIVITIES AND ADLS

## 2024-02-07 ASSESSMENT — PAIN DESCRIPTION - ONSET
ONSET: ON-GOING
ONSET: ON-GOING

## 2024-02-07 ASSESSMENT — PAIN DESCRIPTION - LOCATION
LOCATION: GROIN
LOCATION: GROIN;HIP

## 2024-02-07 ASSESSMENT — PAIN SCALES - WONG BAKER
WONGBAKER_NUMERICALRESPONSE: 0

## 2024-02-07 ASSESSMENT — PAIN SCALES - GENERAL
PAINLEVEL_OUTOF10: 7
PAINLEVEL_OUTOF10: 7
PAINLEVEL_OUTOF10: 10

## 2024-02-07 ASSESSMENT — PAIN DESCRIPTION - PAIN TYPE
TYPE: CHRONIC PAIN
TYPE: CHRONIC PAIN

## 2024-02-07 ASSESSMENT — PAIN DESCRIPTION - DESCRIPTORS
DESCRIPTORS: ACHING
DESCRIPTORS: ACHING

## 2024-02-07 NOTE — PLAN OF CARE
Problem: Discharge Planning  Goal: Discharge to home or other facility with appropriate resources  Outcome: Progressing  Flowsheets (Taken 2/7/2024 0830)  Discharge to home or other facility with appropriate resources: Identify barriers to discharge with patient and caregiver     Problem: Safety - Adult  Goal: Free from fall injury  Outcome: Progressing  Flowsheets (Taken 2/7/2024 1312)  Free From Fall Injury: Instruct family/caregiver on patient safety     Problem: ABCDS Injury Assessment  Goal: Absence of physical injury  Outcome: Progressing  Flowsheets (Taken 2/7/2024 1312)  Absence of Physical Injury: Implement safety measures based on patient assessment     Problem: Pain  Goal: Verbalizes/displays adequate comfort level or baseline comfort level  Outcome: Progressing     Problem: Nutrition Deficit:  Goal: Optimize nutritional status  Outcome: Progressing  Flowsheets (Taken 2/7/2024 0954 by Odalis Agarwal RD)  Nutrient intake appropriate for improving, restoring, or maintaining nutritional needs:   Assess nutritional status and recommend course of action   Monitor oral intake, labs, and treatment plans   Recommend appropriate diets, oral nutritional supplements, and vitamin/mineral supplements   Order, calculate, and assess calorie counts as needed   Recommend, monitor, and adjust tube feedings and TPN/PPN based on assessed needs   Provide specific nutrition education to patient or family as appropriate     Problem: Skin/Tissue Integrity  Goal: Absence of new skin breakdown  Description: 1.  Monitor for areas of redness and/or skin breakdown  2.  Assess vascular access sites hourly  3.  Every 4-6 hours minimum:  Change oxygen saturation probe site  4.  Every 4-6 hours:  If on nasal continuous positive airway pressure, respiratory therapy assess nares and determine need for appliance change or resting period.  Outcome: Progressing     Problem: Musculoskeletal - Adult  Goal: Return mobility to safest

## 2024-02-07 NOTE — FLOWSHEET NOTE
Patient admitted to ARU on 1/26 with dx of Subarachnoid Hemorrhage after a mechanical fall.  A/Ox4. Transfers with stedy and gait belt x1-2. Mobility restrictions: right hip pain, right side flaccid. On Regular diet, tolerating well. Medications taken whole with thin liquids. On Lovenox for DVT prophylaxis.  Skin: Reddened coccyx, resolving . Oxygen: RA. LDA: none. Has been incontinent of bowel. Has urinary retention, on routine straight cath which is his baseline at home . LBM 2/7/2024, continuing to take senokot. Chair/bed alarms in use and call light in reach.  Prefers to stay up in his recliner for comfort. Went to bed after having a BM before 4am. Sleeping at this time.
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
## 2024-02-07 NOTE — PROGRESS NOTES
Department of Physical Medicine & Rehabilitation  Progress Note    Patient Identification:  Eric Ovalles  [id number]  : 1945  Admit date: 2024    Chief Complaint: Subarachnoid hemorrhage (HCC)    Subjective:   No acute events overnight.   Patient seen this afternoon sitting up in gym. He reports ongoing gradual progress with therapies. Wife updated in the gym. She has noted functional progress as well. They both agree he is doing better with his mobility now compared to how he was doing at [facility], however still significantly declined from his baseline.   I d/w OT putting on MANAN as his RLE is worse without compression.   Labs reviewed.     ROS: No f/c, n/v, cp     Objective:  Patient Vitals for the past 24 hrs:   BP Temp Temp src Pulse Resp SpO2 Weight   24 0812 -- -- -- 92 -- -- --   24 0810 -- -- -- -- -- 97 % --   24 0800 129/64 98.1 °F (36.7 °C) Oral -- 16 97 % --   24 0600 -- -- -- -- -- -- 79.3 kg (174 lb 13.2 oz)   24 2332 (!) 142/66 98.6 °F (37 °C) Oral 82 16 -- --   24 -- -- -- -- 16 -- --   24 -- -- -- -- 16 -- --   24 1900 (!) 142/66 98 °F (36.7 °C) Oral 82 18 97 % --   24 1828 138/67 -- -- 84 18 95 % --     Const: Alert. No distress, pleasant.   HEENT: Normocephalic, atraumatic. Normal sclera/conjunctiva. MMM.   CV: Regular rate and rhythm.   Resp: No respiratory distress. Lungs distant/diminished at bases   Abd: Soft, nontender, nondistended, NABS+   Ext: +RUE/RLE edema  MSK: right hip ROM limited   Neuro: Alert, oriented, appropriately interactive. Mildly impaired recall.   Psych: Cooperative, appropriate mood and affect    Laboratory data: Available via EMR.   Last 24 hour lab  No results found for this or any previous visit (from the past 24 hour(s)).          Therapy progress:  Physical therapy:  Bed Mobility:  Overall Assistance Level: Moderate Assistance  Sit>supine:  Assistance Level: Minimal  Anxious/Calm
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
## 2024-02-07 NOTE — PROGRESS NOTES
Comprehensive Nutrition Assessment    Type and Reason for Visit:  Reassess    Nutrition Recommendations/Plan:   Continue current diet.      Malnutrition Assessment:  Malnutrition Status:  Moderate malnutrition (02/07/24 1019)    Context:  Chronic Illness     Findings of the 6 clinical characteristics of malnutrition:  Energy Intake:  Mild decrease in energy intake (Comment)  Weight Loss:  Greater than 5% over 1 month     Body Fat Loss:  No significant body fat loss     Muscle Mass Loss:  Mild muscle mass loss Temples (temporalis)    Nutrition Assessment:    FU - Pt. resting upon RD entering the room. Pt. continues on a regular diet. Meal intakes are maintained at %. Much improved since last assessment. Pt. did not want to replace supplement at last assessment. Pt. does not require supplementation based on recent meal intakes. Gentle weight gain since last assessment, favorable to Pt. No new recommendations at this time. Will continue to monitor diet acceptance and nutritional adequacy.    Nutrition Related Findings:    Nutrition related labs reviewed. Wound Type: None       Current Nutrition Intake & Therapies:    Average Meal Intake: 51-75%, %  Average Supplements Intake: None Ordered  ADULT DIET; Regular    Anthropometric Measures:  Height: 185.9 cm (6' 1.2\")  Ideal Body Weight (IBW): 185 lbs (84 kg)       Current Body Weight: 79.3 kg (174 lb 13.2 oz),   IBW.    Current BMI (kg/m2): 22.9                          BMI Categories: Normal Weight (BMI 22.0 to 24.9) age over 65    Estimated Daily Nutrient Needs:  Energy Requirements Based On: Kcal/kg  Weight Used for Energy Requirements: Usual  Energy (kcal/day): 2000-2400kcal (25-30kcal/kg)  Weight Used for Protein Requirements: Usual  Protein (g/day): 80-96g (1-1.2g/kg)  Method Used for Fluid Requirements: 1 ml/kcal  Fluid (ml/day): 240-1080mL    Nutrition Diagnosis:   Unintended weight loss related to inadequate protein-energy intake as evidenced by poor  intake prior to admission, weight loss greater than or equal to 5% in 1 month    Nutrition Interventions:   Food and/or Nutrient Delivery: Continue Current Diet, Continue Oral Nutrition Supplement  Nutrition Education/Counseling:  (Monitor need)  Coordination of Nutrition Care: Continue to monitor while inpatient       Goals:  Previous Goal Met: Progressing toward Goal(s)  Goals: Meet at least 75% of estimated needs       Nutrition Monitoring and Evaluation:   Behavioral-Environmental Outcomes: None Identified  Food/Nutrient Intake Outcomes: Food and Nutrient Intake  Physical Signs/Symptoms Outcomes: Biochemical Data, GI Status    Discharge Planning:    No discharge needs at this time     Odalis Agarwal RD  Contact: [phone]

## 2024-02-07 NOTE — PLAN OF CARE
Problem: Discharge Planning  Goal: Discharge to home or other facility with appropriate resources  2/6/2024 2313 by Silvnaa De Jesus RN  Outcome: Progressing  2/6/2024 1214 by Juliana Montano RN  Outcome: Progressing     Problem: Safety - Adult  Goal: Free from fall injury  2/6/2024 2313 by Silvana De Jesus RN  Outcome: Progressing  2/6/2024 1214 by Juliana Montano RN  Outcome: Progressing     Problem: ABCDS Injury Assessment  Goal: Absence of physical injury  2/6/2024 2313 by Silvana De Jesus RN  Outcome: Progressing  2/6/2024 1214 by Juliana Montano RN  Outcome: Progressing     Problem: Pain  Goal: Verbalizes/displays adequate comfort level or baseline comfort level  2/6/2024 2313 by Silvana De Jesus RN  Outcome: Progressing  2/6/2024 1214 by Juliana Montano RN  Outcome: Progressing     Problem: Nutrition Deficit:  Goal: Optimize nutritional status  2/6/2024 2313 by Silvana De Jesus RN  Outcome: Progressing  2/6/2024 1214 by Juliana Montano RN  Outcome: Progressing     Problem: Skin/Tissue Integrity  Goal: Absence of new skin breakdown  Description: 1.  Monitor for areas of redness and/or skin breakdown  2.  Assess vascular access sites hourly  3.  Every 4-6 hours minimum:  Change oxygen saturation probe site  4.  Every 4-6 hours:  If on nasal continuous positive airway pressure, respiratory therapy assess nares and determine need for appliance change or resting period.  2/6/2024 2313 by Silvana De Jesus RN  Outcome: Progressing  2/6/2024 1214 by Juliana Montano RN  Outcome: Progressing     Problem: Musculoskeletal - Adult  Goal: Return mobility to safest level of function  2/6/2024 2313 by Silvana De Jesus RN  Outcome: Progressing  2/6/2024 1214 by Juliana Montano RN  Outcome: Progressing  Goal: Maintain proper alignment of affected body part  2/6/2024 2313 by Silvana De Jesus RN  Outcome: Progressing  2/6/2024 1214 by Juliana Montano RN  Outcome:  Progressing  Goal: Return ADL status to a safe level of function  2/6/2024 2313 by Silvana De Jesus RN  Outcome: Progressing  2/6/2024 1214 by Juliana Montano, RN  Outcome: Progressing     Problem: Genitourinary - Adult  Goal: Absence of urinary retention  2/6/2024 2313 by Silvana De Jesus, RN  Outcome: Progressing  2/6/2024 1214 by Juliana Montano, RN  Outcome: Progressing

## 2024-02-07 NOTE — PROGRESS NOTES
Physical Therapy  Facility/Department: [address] [facility]  Rehabilitation Physical Therapy Treatment Note    NAME: Eric Ovalles  : 1945 (78 y.o.)  MRN: [id number]  CODE STATUS: Full Code    Date of Service: 24       Restrictions:  Restrictions/Precautions: Aspiration Risk, Fall Risk  Position Activity Restriction  Other position/activity restrictions: intermittant catheterization as PTA     SUBJECTIVE  Subjective  Subjective: No complaints since yesterdays session.  Pain: denies pain     OBJECTIVE  Cognition  Overall Cognitive Status: Exceptions  Safety Judgement: Decreased awareness of need for safety  Problem Solving: Assistance required to implement solutions;Assistance required to generate solutions  Insights: Decreased awareness of deficits  Cognition Comment: needs extra time to problem solve for transfers/mobility tasks  Orientation  Overall Orientation Status: Within Functional Limits    Functional Mobility  Balance  Sitting Balance: Supervision  Standing Balance: Contact guard assistance  Transfers  Surface: To chair with arms;From chair with arms;Wheelchair  Additional Factors: Increased time to complete;Set-up;Verbal cues  Device: Cane (LBQC)  Sit to Stand  Assistance Level: Minimal assistance;Moderate assistance  Skilled Clinical Factors: stand to LBQC  Stand to Sit  Assistance Level: Minimal assistance;Moderate assistance  Skilled Clinical Factors: Mod A with increased fatigue following amb task  Stand Pivot  Assistance Level: Minimal assistance  Skilled Clinical Factors: w/c <> recliner w/ LBQC      Environmental Mobility  Ambulation  Surface: Level surface  Device: Quad Cane (LBQC)  Distance: 20'  Activity: Within Unit  Additional Factors: Right AFO;Set-up;Verbal cues;Increased time to complete  Assistance Level: Minimal assistance;Moderate assistance  Gait Deviations: Slow butch;Decreased step length bilateral;Decreased weight shift right;Narrow base of support;Unsteady gait  Skilled  Clinical Factors: decreased RLE step length with increased distance and increased unsteadiness (especially with turn to the chair), slides R foot and variable step length with LLE - decreased sequence with LBQC and cues to prevent placing too far in front  Stairs  Stair Height: 6''  Device: One handrail (L HR)  Number of Stairs: 4  Additional Factors: Set-up;Verbal cues;Right AFO;Non-reciprocal going up;Non-reciprocal going down;Increased time to complete  Assistance Level: Minimal assistance;Moderate assistance;Requires x 2 assistance  Skilled Clinical Factors: Steps up with LLE and down with LLE, needs Min A at the RLE to clear each step up/down and additional Min-Mod A x 2 for trunk stability, guarding for R knee buckling  Wheelchair  Surface: Level surface  Device: Standard wheelchair  Assistance Required to Manage Parts: Left leg rest  Assistance Level for Propulsion: Stand by assist  Propulsion Method: Left upper extremity;Left lower extremity  Propulsion Quality: Slow velocity;Short strokes  Propulsion Distance: 200' with multiple turns and 2 surface transitions, down the hallway  from therapy gym to the room with good steering and corrects as needed when getting close to the hallway rail         PT Exercises  A/AROM Exercises: Seated in chair: B 2 x 10: AP, LAQ (AAROM RLE 2 x 10), marches, Hip ADD ball squeeze, Hip ABD w/ RTB      ASSESSMENT/PROGRESS TOWARDS GOALS       Assessment  Assessment: Mr. Ovalles presents with weakness s/p subarachnoid hemorrhage. PLOF pt lives with his wife in a 1 story home with finished basement, there is 2 JOSE through the garage and a stair lift to the lower level. Pt was able to drive, perform transfers and amb with R AFO and LBQC pta, his wife had to help him put on his socks and shoes and get in/out of bed at times. Currently patient is below baseline function, requiring Min-Mod A for transfers and amb up to 20 ft with LBQC. Patient limited by decreased safety awareness,  Co-treatment   Time In 0900         Time Out 0945         Minutes 45           Timed Code Treatment Minutes: 45 Minutes       [employer], PT, 02/07/24 at 1:09 PM

## 2024-02-07 NOTE — PROGRESS NOTES
78 y.o. male patient admitted to rehab with subarachnoid hemorrhage.  A/Ox4. Transfers with stedy x1/2. Mobility restrictions: R side flaccid. Uses leg brace. On reg select diet, tolerating well. Medications taken whole w/ thins. On lovenox for DVT prophylaxis.  Skin: reddened blanchable coccyx. Oxygen: RA. LDA: none. Has sometimes been incontinent of bowel and intermittent straight cath Q 6hr. LBM 2/6. Chair/bed alarms in use and call light in reach.

## 2024-02-08 LAB
ANION GAP SERPL CALCULATED.3IONS-SCNC: 9 MMOL/L (ref 3–16)
BASOPHILS # BLD: 0.1 K/UL (ref 0–0.2)
BASOPHILS NFR BLD: 1 %
BUN SERPL-MCNC: 16 MG/DL (ref 7–20)
CALCIUM SERPL-MCNC: 8.4 MG/DL (ref 8.3–10.6)
CHLORIDE SERPL-SCNC: 101 MMOL/L (ref 99–110)
CO2 SERPL-SCNC: 27 MMOL/L (ref 21–32)
CREAT SERPL-MCNC: 0.7 MG/DL (ref 0.8–1.3)
DEPRECATED RDW RBC AUTO: 15.8 % (ref 12.4–15.4)
EOSINOPHIL # BLD: 0.2 K/UL (ref 0–0.6)
EOSINOPHIL NFR BLD: 3.5 %
GFR SERPLBLD CREATININE-BSD FMLA CKD-EPI: >60 ML/MIN/{1.73_M2}
GLUCOSE SERPL-MCNC: 115 MG/DL (ref 70–99)
HCT VFR BLD AUTO: 23.8 % (ref 40.5–52.5)
HGB BLD-MCNC: 8.3 G/DL (ref 13.5–17.5)
LYMPHOCYTES # BLD: 1.8 K/UL (ref 1–5.1)
LYMPHOCYTES NFR BLD: 27.4 %
MCH RBC QN AUTO: 32.6 PG (ref 26–34)
MCHC RBC AUTO-ENTMCNC: 34.7 G/DL (ref 31–36)
MCV RBC AUTO: 94.2 FL (ref 80–100)
MONOCYTES # BLD: 0.6 K/UL (ref 0–1.3)
MONOCYTES NFR BLD: 9.2 %
NEUTROPHILS # BLD: 4 K/UL (ref 1.7–7.7)
NEUTROPHILS NFR BLD: 58.9 %
PLATELET # BLD AUTO: 397 K/UL (ref 135–450)
PMV BLD AUTO: 7.1 FL (ref 5–10.5)
POTASSIUM SERPL-SCNC: 4.9 MMOL/L (ref 3.5–5.1)
RBC # BLD AUTO: 2.53 M/UL (ref 4.2–5.9)
SODIUM SERPL-SCNC: 137 MMOL/L (ref 136–145)
WBC # BLD AUTO: 6.7 K/UL (ref 4–11)

## 2024-02-08 PROCEDURE — 97110 THERAPEUTIC EXERCISES: CPT

## 2024-02-08 PROCEDURE — 6360000002 HC RX W HCPCS: Performed by: PHYSICAL MEDICINE & REHABILITATION

## 2024-02-08 PROCEDURE — 1280000000 HC REHAB R&B

## 2024-02-08 PROCEDURE — 80048 BASIC METABOLIC PNL TOTAL CA: CPT

## 2024-02-08 PROCEDURE — 51701 INSERT BLADDER CATHETER: CPT

## 2024-02-08 PROCEDURE — 97530 THERAPEUTIC ACTIVITIES: CPT

## 2024-02-08 PROCEDURE — 97116 GAIT TRAINING THERAPY: CPT

## 2024-02-08 PROCEDURE — 97535 SELF CARE MNGMENT TRAINING: CPT

## 2024-02-08 PROCEDURE — 94760 N-INVAS EAR/PLS OXIMETRY 1: CPT

## 2024-02-08 PROCEDURE — 6370000000 HC RX 637 (ALT 250 FOR IP): Performed by: STUDENT IN AN ORGANIZED HEALTH CARE EDUCATION/TRAINING PROGRAM

## 2024-02-08 PROCEDURE — 51798 US URINE CAPACITY MEASURE: CPT

## 2024-02-08 PROCEDURE — 85025 COMPLETE CBC W/AUTO DIFF WBC: CPT

## 2024-02-08 PROCEDURE — 6370000000 HC RX 637 (ALT 250 FOR IP): Performed by: PHYSICAL MEDICINE & REHABILITATION

## 2024-02-08 PROCEDURE — 36415 COLL VENOUS BLD VENIPUNCTURE: CPT

## 2024-02-08 RX ADMIN — SENNOSIDES AND DOCUSATE SODIUM 1 TABLET: 8.6; 5 TABLET ORAL at 07:40

## 2024-02-08 RX ADMIN — DICLOFENAC SODIUM 4 G: 10 GEL TOPICAL at 13:59

## 2024-02-08 RX ADMIN — BACLOFEN 10 MG: 10 TABLET ORAL at 07:40

## 2024-02-08 RX ADMIN — Medication 400 MG: at 07:40

## 2024-02-08 RX ADMIN — BACLOFEN 10 MG: 10 TABLET ORAL at 13:58

## 2024-02-08 RX ADMIN — LAMOTRIGINE 200 MG: 100 TABLET ORAL at 20:36

## 2024-02-08 RX ADMIN — PANTOPRAZOLE SODIUM 40 MG: 40 TABLET, DELAYED RELEASE ORAL at 16:43

## 2024-02-08 RX ADMIN — DICLOFENAC SODIUM 4 G: 10 GEL TOPICAL at 16:43

## 2024-02-08 RX ADMIN — TAMSULOSIN HYDROCHLORIDE 0.4 MG: 0.4 CAPSULE ORAL at 20:36

## 2024-02-08 RX ADMIN — PHENYTOIN SODIUM 100 MG: 100 CAPSULE ORAL at 20:36

## 2024-02-08 RX ADMIN — PANTOPRAZOLE SODIUM 40 MG: 40 TABLET, DELAYED RELEASE ORAL at 06:51

## 2024-02-08 RX ADMIN — ACETAMINOPHEN 1000 MG: 500 TABLET ORAL at 06:51

## 2024-02-08 RX ADMIN — ENOXAPARIN SODIUM 80 MG: 100 INJECTION SUBCUTANEOUS at 07:41

## 2024-02-08 RX ADMIN — OXYCODONE HYDROCHLORIDE 10 MG: 10 TABLET ORAL at 14:00

## 2024-02-08 RX ADMIN — BACLOFEN 10 MG: 10 TABLET ORAL at 20:37

## 2024-02-08 RX ADMIN — LAMOTRIGINE 200 MG: 100 TABLET ORAL at 07:40

## 2024-02-08 RX ADMIN — ACETAMINOPHEN 1000 MG: 500 TABLET ORAL at 20:37

## 2024-02-08 RX ADMIN — DICLOFENAC SODIUM 4 G: 10 GEL TOPICAL at 20:37

## 2024-02-08 RX ADMIN — CARVEDILOL 3.12 MG: 3.12 TABLET, FILM COATED ORAL at 16:43

## 2024-02-08 RX ADMIN — PHENYTOIN SODIUM 100 MG: 100 CAPSULE ORAL at 13:58

## 2024-02-08 RX ADMIN — CARVEDILOL 3.12 MG: 3.12 TABLET, FILM COATED ORAL at 07:40

## 2024-02-08 RX ADMIN — PHENYTOIN SODIUM 100 MG: 100 CAPSULE ORAL at 07:40

## 2024-02-08 RX ADMIN — DICLOFENAC SODIUM 4 G: 10 GEL TOPICAL at 07:41

## 2024-02-08 RX ADMIN — OXYCODONE HYDROCHLORIDE 10 MG: 10 TABLET, FILM COATED, EXTENDED RELEASE ORAL at 20:37

## 2024-02-08 RX ADMIN — ENOXAPARIN SODIUM 80 MG: 100 INJECTION SUBCUTANEOUS at 20:35

## 2024-02-08 RX ADMIN — ACETAMINOPHEN 1000 MG: 500 TABLET ORAL at 13:58

## 2024-02-08 RX ADMIN — OXYCODONE HYDROCHLORIDE 10 MG: 10 TABLET, FILM COATED, EXTENDED RELEASE ORAL at 07:40

## 2024-02-08 RX ADMIN — ATORVASTATIN CALCIUM 80 MG: 80 TABLET, FILM COATED ORAL at 20:37

## 2024-02-08 ASSESSMENT — PAIN DESCRIPTION - ONSET
ONSET: ON-GOING

## 2024-02-08 ASSESSMENT — PAIN SCALES - GENERAL
PAINLEVEL_OUTOF10: 5
PAINLEVEL_OUTOF10: 6
PAINLEVEL_OUTOF10: 7
PAINLEVEL_OUTOF10: 10
PAINLEVEL_OUTOF10: 7
PAINLEVEL_OUTOF10: 6
PAINLEVEL_OUTOF10: 5

## 2024-02-08 ASSESSMENT — PAIN DESCRIPTION - ORIENTATION
ORIENTATION: RIGHT

## 2024-02-08 ASSESSMENT — PAIN DESCRIPTION - FREQUENCY
FREQUENCY: INTERMITTENT

## 2024-02-08 ASSESSMENT — PAIN DESCRIPTION - DESCRIPTORS
DESCRIPTORS: ACHING
DESCRIPTORS: DISCOMFORT

## 2024-02-08 ASSESSMENT — PAIN DESCRIPTION - PAIN TYPE
TYPE: CHRONIC PAIN

## 2024-02-08 ASSESSMENT — PAIN DESCRIPTION - LOCATION
LOCATION: GROIN

## 2024-02-08 ASSESSMENT — PAIN - FUNCTIONAL ASSESSMENT
PAIN_FUNCTIONAL_ASSESSMENT: PREVENTS OR INTERFERES SOME ACTIVE ACTIVITIES AND ADLS
PAIN_FUNCTIONAL_ASSESSMENT: PREVENTS OR INTERFERES SOME ACTIVE ACTIVITIES AND ADLS

## 2024-02-08 NOTE — PROGRESS NOTES
Physical Therapy  Facility/Department: [address] [facility]  Rehabilitation Physical Therapy Treatment Note    NAME: Eric Ovalles  : 1945 (78 y.o.)  MRN: [id number]  CODE STATUS: Full Code    Date of Service: 24       Restrictions:  Restrictions/Precautions: Aspiration Risk, Fall Risk  Position Activity Restriction  Other position/activity restrictions: intermittant catheterization as PTA     SUBJECTIVE  Subjective  Subjective: No complaints since yesterdays session.  Pain: reports pain is manageable in R groin       OBJECTIVE  Cognition  Overall Cognitive Status: Exceptions  Following Commands: Follows multistep commands with increased time;Follows multistep commands with repitition  Attention Span: Appears intact  Safety Judgement: Decreased awareness of need for safety  Problem Solving: Assistance required to implement solutions;Assistance required to generate solutions  Insights: Decreased awareness of deficits  Cognition Comment: needs extra time to problem solve for transfers/mobility tasks  Orientation  Overall Orientation Status: Within Functional Limits    Functional Mobility  Transfers  Surface: Wheelchair  Additional Factors: Increased time to complete;Set-up;Verbal cues  Device: Cane (LBQC)  Sit to Stand  Assistance Level: Minimal assistance;Moderate assistance  Skilled Clinical Factors: stand to LBQC, first time min A, second time mod A, then practices si to stand x 5 with emphasis on leaning forward and was able to perform first 2 with mod A and last 3 with min A.  Stand to Sit  Assistance Level: Minimal assistance;Moderate assistance      Environmental Mobility  Ambulation  Surface: Level surface  Device: Quad Cane (LBQC L)  Distance: 84', first 30' CGA, second 30' min A with pt leaning forward and needing cues to stand tall, last 25' pt min to mod A with maintain upright posture and weight shift L, pt with decreased R step length and foot clearance last 25', one surface transition and 1  function, requiring Min-Mod A for transfers and amb up to 84 ft with LBQC, first 30' pt was CGA, progressed to mod A by the end of the walk with pt needing assist for weight shift and having difficulty advancing R foot.  Patient limited by decreased safety awareness, chronic R sided weakness, and fatigue. He would benefit from continued skilled PT on our ARU to maximize independence and safety with bed mobility, transfers, amb and stairs to enter/exit home prior to D/C anticipated on Friday, Feb. 16th with HHPT.  Activity Tolerance: Patient tolerated treatment well  Discharge Recommendations: Continue to assess pending progress;Patient would benefit from continued therapy after discharge;Home with Home health PT;24 hour supervision or assist  PT Equipment Recommendations  Equipment Needed: No    Goals  Patient Goals   Patient Goals : Pt wants to be able to amb independently again  Short Term Goals  Time Frame for Short Term Goals: 1 week  Short Term Goal 1: Bed mobility with CGA  Short Term Goal 2: Transfers with CGA  Short Term Goal 3: Amb 50' with min A LBQC  Short Term Goal 4: Amb up/down 4 steps with Min A  Short Term Goal 5: Amb up/down curb with min A  Long Term Goals  Time Frame for Long Term Goals : 2 weeks  Long Term Goal 1: Bed mobility with MI  Long Term Goal 2: Transfers with MI  Long Term Goal 3: Amb 100' with MI LBQC  Long Term Goal 4: Amb up/down 4 steps with CGA  Long Term Goal 5: Amb up/down curb with CGA    PLAN OF CARE/SAFETY  Physical Therapy Plan  General Plan:  minutes of therapy at least 5 out of 7 days a week  Days Per Week: 5 Days  Hours Per Day: 1.5 hours  Therapy Duration: 2 Weeks  Current Treatment Recommendations: Strengthening;Balance training;Functional mobility training;Transfer training;Endurance training;Gait training;Stair training;Neuromuscular re-education;Home exercise program;Safety education & training;Patient/Caregiver education & training;Equipment evaluation,  education, & procurement;Therapeutic activities  Safety Devices  Type of Devices: All fall risk precautions in place;Gait belt    EDUCATION           Therapy Time   Individual Concurrent Group Co-treatment   Time In 1015         Time Out 1100         Minutes 45           Timed Code Treatment Minutes: 45 Minutes       Angy Marquez, PT, 02/08/24 at 4:22 PM     PM session: pt with no c/o, wife Juliana present.  O: sit to stand mod A, amb 5' to model car with LBQC and CGA, into car mIN A for controlled descent to sit, min A to lift R LE only into car, pt able to get both feet out of car without assist, sit to stand min A pulling up on car door with PT holding door.  Up and down 4 steps with R rail ascending and CGA, no LOB, descending with min A with pt leaning against rail on his L for support.  Standing balloon batting at slow pace with two instances of leaning backward requiring CGA to min A to recover, batted balloon x 3 minutes.  Transfer sit to stand min A, amb 45' with LBQC and CGA to occasional min A.  Pt reports pain in R groin with walking, not when sitting, has not taken short acting pain meds today.  A/P: terri fair, better movement but increased pain with walking this pm.    Second Session Therapy Time     Individual Co-treatment   Time In 1230     Time Out 1315     Minutes 45          Electronically signed by Angy Marquez PT on 2/8/2024 at 4:22 PM

## 2024-02-08 NOTE — PROGRESS NOTES
Patient admitted to rehab with subarachnoid hemorrhage.  A/Ox4. Transfers with stedy x1-2. Mobility restrictions: right side flaccid, AFO for right foot. On regular diet, tolerating well. Medications taken whole with thins. On lovenox, triston hose for DVT prophylaxis.  Skin: blanchable redness to buttocks, skin tear RFA. Oxygen: RA. LDA: none. Has been mostly continent of bowel and continent of bladder. LBM 2/7/24. Chair/bed alarms in use and call light in reach. Will monitor for safety. Electronically signed by Lidia Pederson RN on 2/8/2024 at 9:05 AM
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
## 2024-02-08 NOTE — PLAN OF CARE
Problem: Safety - Adult  Goal: Free from fall injury  Outcome: Progressing  Flowsheets (Taken 2/8/2024 6833)  Free From Fall Injury:   Instruct family/caregiver on patient safety   Based on caregiver fall risk screen, instruct family/caregiver to ask for assistance with transferring infant if caregiver noted to have fall risk factors  Note: Pt educated on falls precautions and safety. Call light and personal belongings within reach at all times. Non skid socks in use when up. Hourly rounding and alarms active.      Problem: Skin/Tissue Integrity  Goal: Absence of new skin breakdown  Description: 1.  Monitor for areas of redness and/or skin breakdown  2.  Assess vascular access sites hourly  3.  Every 4-6 hours minimum:  Change oxygen saturation probe site  4.  Every 4-6 hours:  If on nasal continuous positive airway pressure, respiratory therapy assess nares and determine need for appliance change or resting period.  Outcome: Progressing  Note: Able to change positions in bed without assist, no evidence of skin breakdown noted. Waffle cushion in place to chair.

## 2024-02-08 NOTE — PLAN OF CARE
Problem: Discharge Planning  Goal: Discharge to home or other facility with appropriate resources  2/8/2024 0903 by Lidia Pederson RN  Outcome: Progressing  Flowsheets (Taken 2/8/2024 0737)  Discharge to home or other facility with appropriate resources:   Identify barriers to discharge with patient and caregiver   Arrange for needed discharge resources and transportation as appropriate   Identify discharge learning needs (meds, wound care, etc)   Refer to discharge planning if patient needs post-hospital services based on physician order or complex needs related to functional status, cognitive ability or social support system     Problem: Safety - Adult  Goal: Free from fall injury  2/8/2024 0903 by Lidia Pederson RN  Outcome: Progressing  Flowsheets (Taken 2/8/2024 0738)  Free From Fall Injury: Instruct family/caregiver on patient safety     Problem: ABCDS Injury Assessment  Goal: Absence of physical injury  2/8/2024 0903 by Lidia Pederson RN  Outcome: Progressing  Flowsheets (Taken 2/8/2024 0738)  Absence of Physical Injury: Implement safety measures based on patient assessment     Problem: Pain  Goal: Verbalizes/displays adequate comfort level or baseline comfort level  2/8/2024 0903 by Lidia Pederson RN  Outcome: Progressing  Flowsheets (Taken 2/8/2024 0737)  Verbalizes/displays adequate comfort level or baseline comfort level:   Encourage patient to monitor pain and request assistance   Assess pain using appropriate pain scale   Administer analgesics based on type and severity of pain and evaluate response   Implement non-pharmacological measures as appropriate and evaluate response     Problem: Nutrition Deficit:  Goal: Optimize nutritional status  2/8/2024 0903 by Lidia Pederson RN  Outcome: Progressing     Problem: Skin/Tissue Integrity  Goal: Absence of new skin breakdown  Description: 1.  Monitor for areas of redness and/or skin breakdown  2.  Assess vascular access sites hourly  3.  Every 4-6  hours minimum:  Change oxygen saturation probe site  4.  Every 4-6 hours:  If on nasal continuous positive airway pressure, respiratory therapy assess nares and determine need for appliance change or resting period.  2/8/2024 0903 by Lidia Pederson RN  Outcome: Progressing

## 2024-02-08 NOTE — PROGRESS NOTES
Department of Physical Medicine & Rehabilitation  Progress Note    Patient Identification:  Eric Ovalles  [id number]  : 1945  Admit date: 2024    Chief Complaint: Subarachnoid hemorrhage (HCC)    Subjective:   No acute events overnight.   Patient seen this afternoon sitting up in gym. Wife and patient discussing safer dressing/bathing techniques for home. Patient reports symptoms overall stable.   Labs reviewed.     ROS: No f/c, n/v, cp     Objective:  Patient Vitals for the past 24 hrs:   BP Temp Temp src Pulse Resp SpO2   24 0938 -- -- -- -- -- 93 %   24 0737 117/66 97.9 °F (36.6 °C) Oral 90 17 93 %   24 2042 (!) 156/75 98 °F (36.7 °C) Axillary 91 17 95 %   24 1641 (!) 145/66 -- -- 84 -- --   24 1638 (!) 145/66 -- -- 84 -- --     Const: Alert. No distress, pleasant.   HEENT: Normocephalic, atraumatic. Normal sclera/conjunctiva. MMM.   CV: Regular rate and rhythm.   Resp: No respiratory distress. Lungs distant/diminished at bases   Abd: Soft, nontender, nondistended, NABS+   Ext: +RUE/RLE edema  MSK: right hip ROM limited   Neuro: Alert, oriented, appropriately interactive. Mildly impaired recall.   Psych: Cooperative, appropriate mood and affect    Laboratory data: Available via EMR.   Last 24 hour lab  Recent Results (from the past 24 hour(s))   CBC with Auto Differential    Collection Time: 24  6:05 AM   Result Value Ref Range    WBC 6.7 4.0 - 11.0 K/uL    RBC 2.53 (L) 4.20 - 5.90 M/uL    Hemoglobin 8.3 (L) 13.5 - 17.5 g/dL    Hematocrit 23.8 (L) 40.5 - 52.5 %    MCV 94.2 80.0 - 100.0 fL    MCH 32.6 26.0 - 34.0 pg    MCHC 34.7 31.0 - 36.0 g/dL    RDW 15.8 (H) 12.4 - 15.4 %    Platelets 397 135 - 450 K/uL    MPV 7.1 5.0 - 10.5 fL    Neutrophils % 58.9 %    Lymphocytes % 27.4 %    Monocytes % 9.2 %    Eosinophils % 3.5 %    Basophils % 1.0 %    Neutrophils Absolute 4.0 1.7 - 7.7 K/uL    Lymphocytes Absolute 1.8 1.0 - 5.1 K/uL    Monocytes Absolute 0.6 0.0 - 1.3  study: ineffective esophageal motility  -f/u GI for biopsy results  -Plan for repeat EGD 8 weeks  -continue ppi BID     CAD, HTN  -s/p stents in 1990s  -continue atorvastatin, carvedilol (decreased dose due to soft BPs -- trend improved)  -ASA on hold until cleared by [facility]    Severe protein calorie malnutrition  -Dietitian consult  -Supplements and education    Hypokalemia  -Improved with replacement, now holding supplement    Hypomagnesemia  -continue supplement    R groin pain  -Suspect hip flexor strain based on exam.   -Xray R hip negative for fracture/dislocation.  -continue pain control as below, diclofenc gel, ice  -PT/OT     Chronic Pain   -continue Oxycontin, prn oxycodone (increased dose/frequency temporarily in setting of acute pain)  -continue baclofen   -started diclofenac gel    Nausea/vomiting (1/31).   -Resolved with improvement in constipation. Continue bowel regimen as below.   -Ineffective esophageal motility likely contributing.   -LFTs, lipase wnl     Neurogenic Bladder: chronic urinary retention on ISC program at baseline  -Intermittent straight cath scheduled Q6 hours  -continue Flomax  -consider resuming home oxybutynin if having symptoms     Neurogenic Bowel: constipation  -senna+colace BID, PRN miralax, MoM, and bisacodyl supp.     Safety   -fall and aspiration precautions     PPx  -DVT: therapeutic lovenox for PEs as above  -GI: pantoprazole    Rehab Progress: Making gradual progress. Overall min-modA for transfers and ambulation, SBA for wheelchair propulsion, Still up to maxA for ADLs. Barriers include: pain, baseline right hemiparesis and sensory deficit, endurance, cognition.    Anticipated Dispo: home with spouse  Services:  PT, OT, RN, ? SLP  DME: trevor SALDANA: 2/16      Padmini Marie MD 2/8/2024, 10:18 AM

## 2024-02-09 PROCEDURE — 97535 SELF CARE MNGMENT TRAINING: CPT

## 2024-02-09 PROCEDURE — 97110 THERAPEUTIC EXERCISES: CPT

## 2024-02-09 PROCEDURE — 51798 US URINE CAPACITY MEASURE: CPT

## 2024-02-09 PROCEDURE — 6370000000 HC RX 637 (ALT 250 FOR IP): Performed by: PHYSICAL MEDICINE & REHABILITATION

## 2024-02-09 PROCEDURE — 97116 GAIT TRAINING THERAPY: CPT

## 2024-02-09 PROCEDURE — 1280000000 HC REHAB R&B

## 2024-02-09 PROCEDURE — 97530 THERAPEUTIC ACTIVITIES: CPT

## 2024-02-09 PROCEDURE — 51701 INSERT BLADDER CATHETER: CPT

## 2024-02-09 PROCEDURE — 6370000000 HC RX 637 (ALT 250 FOR IP): Performed by: STUDENT IN AN ORGANIZED HEALTH CARE EDUCATION/TRAINING PROGRAM

## 2024-02-09 PROCEDURE — 6360000002 HC RX W HCPCS: Performed by: PHYSICAL MEDICINE & REHABILITATION

## 2024-02-09 PROCEDURE — 94760 N-INVAS EAR/PLS OXIMETRY 1: CPT

## 2024-02-09 RX ADMIN — PHENYTOIN SODIUM 100 MG: 100 CAPSULE ORAL at 14:53

## 2024-02-09 RX ADMIN — PHENYTOIN SODIUM 100 MG: 100 CAPSULE ORAL at 08:17

## 2024-02-09 RX ADMIN — BACLOFEN 10 MG: 10 TABLET ORAL at 08:17

## 2024-02-09 RX ADMIN — ACETAMINOPHEN 1000 MG: 500 TABLET ORAL at 14:53

## 2024-02-09 RX ADMIN — DICLOFENAC SODIUM 4 G: 10 GEL TOPICAL at 14:53

## 2024-02-09 RX ADMIN — DICLOFENAC SODIUM 4 G: 10 GEL TOPICAL at 08:19

## 2024-02-09 RX ADMIN — BACLOFEN 10 MG: 10 TABLET ORAL at 14:53

## 2024-02-09 RX ADMIN — LAMOTRIGINE 200 MG: 100 TABLET ORAL at 21:01

## 2024-02-09 RX ADMIN — Medication 400 MG: at 08:17

## 2024-02-09 RX ADMIN — CARVEDILOL 3.12 MG: 3.12 TABLET, FILM COATED ORAL at 16:14

## 2024-02-09 RX ADMIN — PANTOPRAZOLE SODIUM 40 MG: 40 TABLET, DELAYED RELEASE ORAL at 06:12

## 2024-02-09 RX ADMIN — TAMSULOSIN HYDROCHLORIDE 0.4 MG: 0.4 CAPSULE ORAL at 21:01

## 2024-02-09 RX ADMIN — BACLOFEN 10 MG: 10 TABLET ORAL at 21:01

## 2024-02-09 RX ADMIN — ENOXAPARIN SODIUM 80 MG: 100 INJECTION SUBCUTANEOUS at 08:15

## 2024-02-09 RX ADMIN — DICLOFENAC SODIUM 4 G: 10 GEL TOPICAL at 16:14

## 2024-02-09 RX ADMIN — OXYCODONE HYDROCHLORIDE 10 MG: 10 TABLET, FILM COATED, EXTENDED RELEASE ORAL at 21:02

## 2024-02-09 RX ADMIN — OXYCODONE HYDROCHLORIDE 10 MG: 10 TABLET ORAL at 11:09

## 2024-02-09 RX ADMIN — CARVEDILOL 3.12 MG: 3.12 TABLET, FILM COATED ORAL at 08:17

## 2024-02-09 RX ADMIN — PANTOPRAZOLE SODIUM 40 MG: 40 TABLET, DELAYED RELEASE ORAL at 16:14

## 2024-02-09 RX ADMIN — ACETAMINOPHEN 1000 MG: 500 TABLET ORAL at 21:02

## 2024-02-09 RX ADMIN — PHENYTOIN SODIUM 100 MG: 100 CAPSULE ORAL at 21:01

## 2024-02-09 RX ADMIN — ATORVASTATIN CALCIUM 80 MG: 80 TABLET, FILM COATED ORAL at 21:01

## 2024-02-09 RX ADMIN — ENOXAPARIN SODIUM 80 MG: 100 INJECTION SUBCUTANEOUS at 21:01

## 2024-02-09 RX ADMIN — ACETAMINOPHEN 1000 MG: 500 TABLET ORAL at 06:12

## 2024-02-09 RX ADMIN — LAMOTRIGINE 200 MG: 100 TABLET ORAL at 08:17

## 2024-02-09 RX ADMIN — OXYCODONE HYDROCHLORIDE 10 MG: 10 TABLET, FILM COATED, EXTENDED RELEASE ORAL at 08:17

## 2024-02-09 ASSESSMENT — PAIN DESCRIPTION - ORIENTATION
ORIENTATION: RIGHT

## 2024-02-09 ASSESSMENT — PAIN SCALES - GENERAL
PAINLEVEL_OUTOF10: 0
PAINLEVEL_OUTOF10: 0
PAINLEVEL_OUTOF10: 8
PAINLEVEL_OUTOF10: 5
PAINLEVEL_OUTOF10: 7
PAINLEVEL_OUTOF10: 7

## 2024-02-09 ASSESSMENT — PAIN DESCRIPTION - ONSET
ONSET: ON-GOING

## 2024-02-09 ASSESSMENT — PAIN DESCRIPTION - LOCATION
LOCATION: GROIN

## 2024-02-09 ASSESSMENT — PAIN DESCRIPTION - PAIN TYPE
TYPE: CHRONIC PAIN

## 2024-02-09 ASSESSMENT — PAIN DESCRIPTION - DESCRIPTORS
DESCRIPTORS: SORE;ACHING
DESCRIPTORS: SORE
DESCRIPTORS: SORE;ACHING

## 2024-02-09 ASSESSMENT — PAIN DESCRIPTION - FREQUENCY
FREQUENCY: INTERMITTENT
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
## 2024-02-09 NOTE — PROGRESS NOTES
Patient admitted to rehab with subarachnoid hemorrhage s/p mechanical fall.  A/Ox4. Transfers with stedy x1-2. Mobility restrictions: right side flaccid, AFO for right foot. On regular diet, tolerating well. Medications taken whole with thins. On lovenox, triston hose for DVT prophylaxis.  Skin: buttocks red, skin tear RFA. Oxygen: RA. LDA: none. Has been continent of bowel and continent of bladder. LBM 2/9/24. Chair/bed alarms in use and call light in reach. Will monitor for safety. Electronically signed by Lidia Pederson RN on 2/9/2024 at 10:44 AM

## 2024-02-09 NOTE — PROGRESS NOTES
Resting quietly in the chair. Patient preferred to stay in the chair versus going into bed. Admitted s/p sub arachnoid hemorrhage after a fall at home. 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 Transferring with a stedy of 1. Lungs CTA, HR regular. Abdomen non tender with active bowel sounds. Has been continent of bowel. Requiring straight cath for neurogenic bladder. C/o right groin pan and medicated per routine orders. Encouraged to call for all needs, call light remains in reach at all times. Chair alarm active. Justa Landa RN
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
## 2024-02-09 NOTE — PLAN OF CARE
Problem: Discharge Planning  Goal: Discharge to home or other facility with appropriate resources  2/9/2024 1041 by Lidia Pederson RN  Outcome: Progressing  Flowsheets (Taken 2/9/2024 0813)  Discharge to home or other facility with appropriate resources:   Identify barriers to discharge with patient and caregiver   Arrange for needed discharge resources and transportation as appropriate   Identify discharge learning needs (meds, wound care, etc)   Refer to discharge planning if patient needs post-hospital services based on physician order or complex needs related to functional status, cognitive ability or social support system     Problem: Safety - Adult  Goal: Free from fall injury  2/9/2024 1041 by Lidia Pederson RN  Outcome: Progressing  Flowsheets (Taken 2/9/2024 0811)  Free From Fall Injury: Instruct family/caregiver on patient safety     Problem: ABCDS Injury Assessment  Goal: Absence of physical injury  2/9/2024 1041 by Lidia Pederson RN  Outcome: Progressing  Flowsheets (Taken 2/9/2024 0811)  Absence of Physical Injury: Implement safety measures based on patient assessment     Problem: Pain  Goal: Verbalizes/displays adequate comfort level or baseline comfort level  2/9/2024 1041 by Lidia Pederson RN  Outcome: Progressing  Flowsheets (Taken 2/9/2024 0813)  Verbalizes/displays adequate comfort level or baseline comfort level:   Encourage patient to monitor pain and request assistance   Assess pain using appropriate pain scale   Administer analgesics based on type and severity of pain and evaluate response   Implement non-pharmacological measures as appropriate and evaluate response     Problem: Nutrition Deficit:  Goal: Optimize nutritional status  2/9/2024 1041 by Lidia Pederson RN  Outcome: Progressing     Problem: Skin/Tissue Integrity  Goal: Absence of new skin breakdown  Description: 1.  Monitor for areas of redness and/or skin breakdown  2.  Assess vascular access sites hourly  3.  Every 4-6

## 2024-02-09 NOTE — PLAN OF CARE
Problem: Safety - Adult  Goal: Free from fall injury  Outcome: Progressing  Flowsheets (Taken 2/9/2024 0401)  Free From Fall Injury:   Instruct family/caregiver on patient safety   Based on caregiver fall risk screen, instruct family/caregiver to ask for assistance with transferring infant if caregiver noted to have fall risk factors  Note: Pt educated on falls precautions and safety. Call light and personal belongings within reach at all times. Non skid socks in use when up. Hourly rounding and alarms active.      Problem: Skin/Tissue Integrity  Goal: Absence of new skin breakdown  Description: 1.  Monitor for areas of redness and/or skin breakdown  2.  Assess vascular access sites hourly  3.  Every 4-6 hours minimum:  Change oxygen saturation probe site  4.  Every 4-6 hours:  If on nasal continuous positive airway pressure, respiratory therapy assess nares and determine need for appliance change or resting period.  Outcome: Progressing  Note: Able to change positions in bed without assist, no evidence of skin breakdown noted. Waffle cushion in place to chair.

## 2024-02-10 PROCEDURE — 6360000002 HC RX W HCPCS: Performed by: PHYSICAL MEDICINE & REHABILITATION

## 2024-02-10 PROCEDURE — 94760 N-INVAS EAR/PLS OXIMETRY 1: CPT

## 2024-02-10 PROCEDURE — 51701 INSERT BLADDER CATHETER: CPT

## 2024-02-10 PROCEDURE — 6370000000 HC RX 637 (ALT 250 FOR IP): Performed by: PHYSICAL MEDICINE & REHABILITATION

## 2024-02-10 PROCEDURE — 6370000000 HC RX 637 (ALT 250 FOR IP): Performed by: STUDENT IN AN ORGANIZED HEALTH CARE EDUCATION/TRAINING PROGRAM

## 2024-02-10 PROCEDURE — 1280000000 HC REHAB R&B

## 2024-02-10 PROCEDURE — 51798 US URINE CAPACITY MEASURE: CPT

## 2024-02-10 RX ADMIN — DICLOFENAC SODIUM 4 G: 10 GEL TOPICAL at 18:21

## 2024-02-10 RX ADMIN — BACLOFEN 10 MG: 10 TABLET ORAL at 19:50

## 2024-02-10 RX ADMIN — ATORVASTATIN CALCIUM 80 MG: 80 TABLET, FILM COATED ORAL at 19:50

## 2024-02-10 RX ADMIN — SENNOSIDES AND DOCUSATE SODIUM 1 TABLET: 8.6; 5 TABLET ORAL at 20:48

## 2024-02-10 RX ADMIN — PHENYTOIN SODIUM 100 MG: 100 CAPSULE ORAL at 15:14

## 2024-02-10 RX ADMIN — ENOXAPARIN SODIUM 80 MG: 100 INJECTION SUBCUTANEOUS at 08:35

## 2024-02-10 RX ADMIN — LAMOTRIGINE 200 MG: 100 TABLET ORAL at 08:35

## 2024-02-10 RX ADMIN — OXYCODONE HYDROCHLORIDE 10 MG: 10 TABLET, FILM COATED, EXTENDED RELEASE ORAL at 19:50

## 2024-02-10 RX ADMIN — CARVEDILOL 3.12 MG: 3.12 TABLET, FILM COATED ORAL at 18:21

## 2024-02-10 RX ADMIN — BACLOFEN 10 MG: 10 TABLET ORAL at 08:36

## 2024-02-10 RX ADMIN — OXYCODONE HYDROCHLORIDE 10 MG: 10 TABLET, FILM COATED, EXTENDED RELEASE ORAL at 08:36

## 2024-02-10 RX ADMIN — PANTOPRAZOLE SODIUM 40 MG: 40 TABLET, DELAYED RELEASE ORAL at 06:16

## 2024-02-10 RX ADMIN — DICLOFENAC SODIUM 4 G: 10 GEL TOPICAL at 08:40

## 2024-02-10 RX ADMIN — ACETAMINOPHEN 1000 MG: 500 TABLET ORAL at 22:01

## 2024-02-10 RX ADMIN — PANTOPRAZOLE SODIUM 40 MG: 40 TABLET, DELAYED RELEASE ORAL at 15:14

## 2024-02-10 RX ADMIN — DICLOFENAC SODIUM 4 G: 10 GEL TOPICAL at 22:03

## 2024-02-10 RX ADMIN — PHENYTOIN SODIUM 100 MG: 100 CAPSULE ORAL at 08:35

## 2024-02-10 RX ADMIN — LAMOTRIGINE 200 MG: 100 TABLET ORAL at 20:48

## 2024-02-10 RX ADMIN — Medication 400 MG: at 08:35

## 2024-02-10 RX ADMIN — TAMSULOSIN HYDROCHLORIDE 0.4 MG: 0.4 CAPSULE ORAL at 20:48

## 2024-02-10 RX ADMIN — ENOXAPARIN SODIUM 80 MG: 100 INJECTION SUBCUTANEOUS at 20:48

## 2024-02-10 RX ADMIN — DICLOFENAC SODIUM 4 G: 10 GEL TOPICAL at 15:16

## 2024-02-10 RX ADMIN — ACETAMINOPHEN 1000 MG: 500 TABLET ORAL at 06:16

## 2024-02-10 RX ADMIN — BACLOFEN 10 MG: 10 TABLET ORAL at 15:14

## 2024-02-10 RX ADMIN — Medication 3 MG: at 22:14

## 2024-02-10 RX ADMIN — ACETAMINOPHEN 1000 MG: 500 TABLET ORAL at 15:14

## 2024-02-10 RX ADMIN — PHENYTOIN SODIUM 100 MG: 100 CAPSULE ORAL at 20:48

## 2024-02-10 ASSESSMENT — PAIN DESCRIPTION - PAIN TYPE
TYPE: CHRONIC PAIN

## 2024-02-10 ASSESSMENT — PAIN SCALES - WONG BAKER
WONGBAKER_NUMERICALRESPONSE: 2
WONGBAKER_NUMERICALRESPONSE: 4
WONGBAKER_NUMERICALRESPONSE: 4
WONGBAKER_NUMERICALRESPONSE: 2
WONGBAKER_NUMERICALRESPONSE: 4
WONGBAKER_NUMERICALRESPONSE: 6

## 2024-02-10 ASSESSMENT — PAIN SCALES - GENERAL
PAINLEVEL_OUTOF10: 7
PAINLEVEL_OUTOF10: 5
PAINLEVEL_OUTOF10: 7
PAINLEVEL_OUTOF10: 7
PAINLEVEL_OUTOF10: 6
PAINLEVEL_OUTOF10: 6
PAINLEVEL_OUTOF10: 7
PAINLEVEL_OUTOF10: 5
PAINLEVEL_OUTOF10: 4
PAINLEVEL_OUTOF10: 7

## 2024-02-10 ASSESSMENT — PAIN DESCRIPTION - ORIENTATION
ORIENTATION: RIGHT

## 2024-02-10 ASSESSMENT — PAIN DESCRIPTION - FREQUENCY
FREQUENCY: INTERMITTENT

## 2024-02-10 ASSESSMENT — PAIN - FUNCTIONAL ASSESSMENT
PAIN_FUNCTIONAL_ASSESSMENT: PREVENTS OR INTERFERES WITH MANY ACTIVE NOT PASSIVE ACTIVITIES
PAIN_FUNCTIONAL_ASSESSMENT: ACTIVITIES ARE NOT PREVENTED
PAIN_FUNCTIONAL_ASSESSMENT: PREVENTS OR INTERFERES WITH MANY ACTIVE NOT PASSIVE ACTIVITIES

## 2024-02-10 ASSESSMENT — PAIN DESCRIPTION - DESCRIPTORS
DESCRIPTORS: ACHING;DISCOMFORT
DESCRIPTORS: DISCOMFORT;ACHING
DESCRIPTORS: ACHING
DESCRIPTORS: DISCOMFORT;ACHING
DESCRIPTORS: ACHING;DISCOMFORT

## 2024-02-10 ASSESSMENT — PAIN DESCRIPTION - ONSET
ONSET: ON-GOING

## 2024-02-10 ASSESSMENT — PAIN DESCRIPTION - LOCATION
LOCATION: HIP;LEG;GROIN
LOCATION: GROIN;HIP
LOCATION: HIP;LEG;GROIN
LOCATION: LEG;HIP;GROIN
LOCATION: GROIN;HIP;LEG

## 2024-02-10 NOTE — PLAN OF CARE
Problem: Discharge Planning  Goal: Discharge to home or other facility with appropriate resources  2/10/2024 1427 by Juliana Montano RN  Outcome: Progressing  2/10/2024 0347 by Sudha Alvarado, RN  Outcome: Progressing    Problem: Pain  Goal: Verbalizes/displays adequate comfort level or baseline comfort level  2/10/2024 1427 by Juliana Montano RN  Outcome: Progressing     Problem: Nutrition Deficit:  Goal: Optimize nutritional status  2/10/2024 1427 by Juliana Montano RN  Outcome: Progressing     Problem: Skin/Tissue Integrity  Goal: Absence of new skin breakdown  Description: 1.  Monitor for areas of redness and/or skin breakdown  2.  Assess vascular access sites hourly  3.  Every 4-6 hours minimum:  Change oxygen saturation probe site  4.  Every 4-6 hours:  If on nasal continuous positive airway pressure, respiratory therapy assess nares and determine need for appliance change or resting period.  2/10/2024 1427 by Juliana Montano RN  Outcome: Progressing     Problem: Musculoskeletal - Adult  Goal: Return mobility to safest level of function  2/10/2024 1427 by Juliana Montano RN  Outcome: Progressing     Problem: Musculoskeletal - Adult  Goal: Maintain proper alignment of affected body part  2/10/2024 1427 by Juliana Montano RN  Outcome: Progressing     Problem: Musculoskeletal - Adult  Goal: Return ADL status to a safe level of function  2/10/2024 1427 by Juliana Montano RN  Outcome: Progressing     Problem: Genitourinary - Adult  Goal: Absence of urinary retention  2/10/2024 1427 by Juliana Montano RN  Outcome: Progressing      Problem: Safety - Adult  Goal: Free from fall injury  2/10/2024 1427 by Juliana Montano RN  Outcome: Progressing     Problem: ABCDS Injury Assessment  Goal: Absence of physical injury  2/10/2024 1427 by Juliana Montano RN  Outcome: Progressing  2/10/2024 0347 by Sudha Alvarado, DAVID  Outcome:  Progressing  Flowsheets (Taken 2/10/2024 8663)  Absence of Physical Injury: Implement safety measures based on patient assessment

## 2024-02-10 NOTE — PROGRESS NOTES
Order placed she can go to [facility] or the office   Patient admitted to rehab with Subarachnoid Hemorrhage.  A/Ox4. Transfers with stedy and gait belt x1-2. Mobility restrictions: right hip pain, right side flaccid pain has had a reduction in pain. On Regular diet, tolerating well. Medications taken whole with thin liquids. On Lovenox for DVT prophylaxis.  Skin: Reddened coccyx, resolving . Oxygen: RA. LDA: none. Has been continent of bowel and continent of bladder, straight cath every 6 hours. LBM 2/9/2024, continuing to take senokot. Chair/bed alarms in use and call light in reach. Will monitor for safety. Electronically signed by Juliana Montano RN on 2/10/2024 at 5:21 PM

## 2024-02-10 NOTE — PLAN OF CARE
Problem: Safety - Adult  Goal: Free from fall injury  Outcome: Progressing  Note: Patient free from falls this shift. Fall precautions in place at all times. Bed in lowest position with two side rails up and wheels locked. Call light within reach. Patient able and agreeable to contact for safety appropriately. Patient up to bathroom using stedy, gait-belt, and 2 assist. Tolerated fairly well.        Problem: Pain  Goal: Verbalizes/displays adequate comfort level or baseline comfort level  Outcome: Progressing  Note: Patient has chronic pain and takes scheduled extended release OxyContin BID, denied pain this evening. Encouraged to inform staff of any changes in condition.

## 2024-02-10 NOTE — PROGRESS NOTES
Patient admitted to rehab with subarachnoid hemorrhage s/p mechanical fall.  A/Ox4. Transfers with stedy x1-2. Mobility restrictions: right side flaccid, AFO for right foot. On regular diet, tolerating well. Medications taken whole with thins. On lovenox, triston hose for DVT prophylaxis. Skin: buttocks red, skin tear RFA. Oxygen: RA. LDA: none. Has been continent of bowel and  bladder. LBM 2/9. Chair/bed alarms in use and call light in reach. Fall precautions in place. Will continue to monitor and assist as needed.

## 2024-02-11 VITALS
TEMPERATURE: 98 F | HEIGHT: 73 IN | HEART RATE: 85 BPM | SYSTOLIC BLOOD PRESSURE: 131 MMHG | OXYGEN SATURATION: 99 % | WEIGHT: 182.1 LBS | RESPIRATION RATE: 16 BRPM | BODY MASS INDEX: 24.13 KG/M2 | DIASTOLIC BLOOD PRESSURE: 74 MMHG

## 2024-02-11 PROCEDURE — 51701 INSERT BLADDER CATHETER: CPT

## 2024-02-11 PROCEDURE — 6360000002 HC RX W HCPCS: Performed by: PHYSICAL MEDICINE & REHABILITATION

## 2024-02-11 PROCEDURE — 6370000000 HC RX 637 (ALT 250 FOR IP): Performed by: PHYSICAL MEDICINE & REHABILITATION

## 2024-02-11 PROCEDURE — 1280000000 HC REHAB R&B

## 2024-02-11 PROCEDURE — 51798 US URINE CAPACITY MEASURE: CPT

## 2024-02-11 PROCEDURE — 6370000000 HC RX 637 (ALT 250 FOR IP): Performed by: STUDENT IN AN ORGANIZED HEALTH CARE EDUCATION/TRAINING PROGRAM

## 2024-02-11 RX ADMIN — ACETAMINOPHEN 1000 MG: 500 TABLET ORAL at 05:01

## 2024-02-11 RX ADMIN — PHENYTOIN SODIUM 100 MG: 100 CAPSULE ORAL at 21:11

## 2024-02-11 RX ADMIN — SENNOSIDES AND DOCUSATE SODIUM 1 TABLET: 8.6; 5 TABLET ORAL at 20:17

## 2024-02-11 RX ADMIN — ENOXAPARIN SODIUM 80 MG: 100 INJECTION SUBCUTANEOUS at 12:55

## 2024-02-11 RX ADMIN — Medication 3 MG: at 20:17

## 2024-02-11 RX ADMIN — TAMSULOSIN HYDROCHLORIDE 0.4 MG: 0.4 CAPSULE ORAL at 20:17

## 2024-02-11 RX ADMIN — PANTOPRAZOLE SODIUM 40 MG: 40 TABLET, DELAYED RELEASE ORAL at 05:01

## 2024-02-11 RX ADMIN — CARVEDILOL 3.12 MG: 3.12 TABLET, FILM COATED ORAL at 12:55

## 2024-02-11 RX ADMIN — DICLOFENAC SODIUM 4 G: 10 GEL TOPICAL at 21:15

## 2024-02-11 RX ADMIN — CARVEDILOL 3.12 MG: 3.12 TABLET, FILM COATED ORAL at 17:32

## 2024-02-11 RX ADMIN — OXYCODONE HYDROCHLORIDE 10 MG: 10 TABLET, FILM COATED, EXTENDED RELEASE ORAL at 20:16

## 2024-02-11 RX ADMIN — Medication 400 MG: at 12:56

## 2024-02-11 RX ADMIN — SENNOSIDES AND DOCUSATE SODIUM 1 TABLET: 8.6; 5 TABLET ORAL at 12:54

## 2024-02-11 RX ADMIN — ATORVASTATIN CALCIUM 80 MG: 80 TABLET, FILM COATED ORAL at 20:17

## 2024-02-11 RX ADMIN — BACLOFEN 10 MG: 10 TABLET ORAL at 20:17

## 2024-02-11 RX ADMIN — ENOXAPARIN SODIUM 80 MG: 100 INJECTION SUBCUTANEOUS at 21:11

## 2024-02-11 RX ADMIN — DICLOFENAC SODIUM 4 G: 10 GEL TOPICAL at 13:06

## 2024-02-11 RX ADMIN — PANTOPRAZOLE SODIUM 40 MG: 40 TABLET, DELAYED RELEASE ORAL at 15:17

## 2024-02-11 RX ADMIN — LAMOTRIGINE 200 MG: 100 TABLET ORAL at 12:56

## 2024-02-11 RX ADMIN — ACETAMINOPHEN 1000 MG: 500 TABLET ORAL at 15:16

## 2024-02-11 RX ADMIN — LAMOTRIGINE 200 MG: 100 TABLET ORAL at 21:11

## 2024-02-11 RX ADMIN — OXYCODONE HYDROCHLORIDE 10 MG: 10 TABLET, FILM COATED, EXTENDED RELEASE ORAL at 12:56

## 2024-02-11 RX ADMIN — ACETAMINOPHEN 1000 MG: 500 TABLET ORAL at 21:11

## 2024-02-11 RX ADMIN — BACLOFEN 10 MG: 10 TABLET ORAL at 12:56

## 2024-02-11 RX ADMIN — PHENYTOIN SODIUM 100 MG: 100 CAPSULE ORAL at 17:29

## 2024-02-11 RX ADMIN — PHENYTOIN SODIUM 100 MG: 100 CAPSULE ORAL at 12:56

## 2024-02-11 ASSESSMENT — PAIN SCALES - GENERAL
PAINLEVEL_OUTOF10: 6
PAINLEVEL_OUTOF10: 6
PAINLEVEL_OUTOF10: 5
PAINLEVEL_OUTOF10: 6
PAINLEVEL_OUTOF10: 6
PAINLEVEL_OUTOF10: 5
PAINLEVEL_OUTOF10: 7
PAINLEVEL_OUTOF10: 8

## 2024-02-11 ASSESSMENT — PAIN DESCRIPTION - ORIENTATION
ORIENTATION: RIGHT

## 2024-02-11 ASSESSMENT — PAIN DESCRIPTION - DESCRIPTORS
DESCRIPTORS: ACHING;DISCOMFORT

## 2024-02-11 ASSESSMENT — PAIN DESCRIPTION - LOCATION
LOCATION: GROIN;HIP;LEG
LOCATION: HIP;LEG;GROIN
LOCATION: LEG;GROIN;HIP

## 2024-02-11 ASSESSMENT — PAIN DESCRIPTION - ONSET
ONSET: ON-GOING
ONSET: ON-GOING

## 2024-02-11 ASSESSMENT — PAIN - FUNCTIONAL ASSESSMENT
PAIN_FUNCTIONAL_ASSESSMENT: PREVENTS OR INTERFERES WITH MANY ACTIVE NOT PASSIVE ACTIVITIES
PAIN_FUNCTIONAL_ASSESSMENT: ACTIVITIES ARE NOT PREVENTED
PAIN_FUNCTIONAL_ASSESSMENT: PREVENTS OR INTERFERES WITH MANY ACTIVE NOT PASSIVE ACTIVITIES
PAIN_FUNCTIONAL_ASSESSMENT: ACTIVITIES ARE NOT PREVENTED
PAIN_FUNCTIONAL_ASSESSMENT: PREVENTS OR INTERFERES WITH MANY ACTIVE NOT PASSIVE ACTIVITIES

## 2024-02-11 ASSESSMENT — PAIN SCALES - WONG BAKER
WONGBAKER_NUMERICALRESPONSE: 2
WONGBAKER_NUMERICALRESPONSE: 0
WONGBAKER_NUMERICALRESPONSE: 2
WONGBAKER_NUMERICALRESPONSE: 0

## 2024-02-11 ASSESSMENT — PAIN DESCRIPTION - FREQUENCY
FREQUENCY: INTERMITTENT
FREQUENCY: INTERMITTENT

## 2024-02-11 ASSESSMENT — PAIN DESCRIPTION - PAIN TYPE
TYPE: CHRONIC PAIN
TYPE: CHRONIC PAIN

## 2024-02-11 NOTE — PROGRESS NOTES
TEDS placed today:    Purple Toe to Right swelled leg  Blue/Gray Toe to Left Leg    This worked very well in controlling swelling and fit well.  Purple is XXL  Blue/Gray is L.    Pt is very satisfied. Electronically signed by Juliana Montano RN on 2/11/2024 at 5:36 PM

## 2024-02-11 NOTE — PROGRESS NOTES
Department of Physical Medicine & Rehabilitation  Progress Note    Patient Identification:  Eric Ovalles  [id number]  : 1945  Admit date: 2024    Chief Complaint: Subarachnoid hemorrhage (HCC)    Subjective:   No acute events overnight.   Patient states that he is feeling well and denies any new onset complaints.    ROS: No f/c, n/v, cp     Objective:  Patient Vitals for the past 24 hrs:   BP Temp Temp src Pulse Resp SpO2 Weight   02/10/24 2020 -- -- -- -- 18 -- --   02/10/24 1930 135/62 98.7 °F (37.1 °C) Oral 89 18 95 % --   02/10/24 1800 124/89 -- -- 98 18 -- --   02/10/24 0906 -- -- -- -- 18 -- --   02/10/24 0836 (!) 108/58 97.4 °F (36.3 °C) Oral 97 18 95 % --   02/10/24 0816 -- -- -- -- -- 96 % --   02/10/24 0646 -- -- -- -- 16 -- --   02/10/24 0542 -- -- -- -- -- -- 80.4 kg (177 lb 4 oz)     Const: Alert. No distress, pleasant.   HEENT: Normocephalic, atraumatic. Normal sclera/conjunctiva. MMM.   CV: Regular rate and rhythm.   Resp: No respiratory distress. Lungs distant/diminished at bases   Abd: Soft, nontender, nondistended, NABS+   Ext: +RUE/RLE edema  MSK: right hip ROM limited   Neuro: Alert, oriented, appropriately interactive. Mildly impaired recall.   Psych: Cooperative, appropriate mood and affect    Laboratory data: Available via EMR.   Last 24 hour lab  No results found for this or any previous visit (from the past 24 hour(s)).            Therapy progress:  Physical therapy:  Bed Mobility:  Overall Assistance Level: Moderate Assistance  Sit>supine:  Assistance Level: Minimal assistance  Skilled Clinical Factors: with R LE and trunk  Supine>sit:  Assistance Level: Moderate assistance  Skilled Clinical Factors: with trunk and R LE  Transfers:  Surface: Wheelchair  Additional Factors: Increased time to complete, Set-up, Verbal cues  Device: Cane (LBQC)  Sit>stand:  Assistance Level: Minimal assistance, Contact guard assist  Skilled Clinical Factors: stand to LBQC, first several times  Required to Manage Parts: Left leg rest  Assistance Level for Propulsion: Stand by assist  Propulsion Method: Left upper extremity, Left lower extremity  Propulsion Quality: Slow velocity, Short strokes  Propulsion Distance: 200' with multiple turns and 2 surface transitions, down the hallway  from therapy gym to the room with good steering and corrects as needed when getting close to the hallway rail    Occupational therapy:   Feeding     Grooming/Oral Hygiene  Assistance Level: Modified independent  Skilled Clinical Factors: seated in wheelchair at sink to complete oral care, shave and comb hair  UE Bathing  Assistance Level: Supervision  Skilled Clinical Factors: seated in w/c in front of sink ,used long sponge with cues for axilla  LE Bathing  Assistance Level: Minimal assistance  Skilled Clinical Factors: seated in w/c long sponge for LE with cues, assisted with buttocks when stood for LB dressing  UE Dressing  Assistance Level: Moderate assistance  Skilled Clinical Factors: assist with RUE and down trunk  LE Dressing  Assistance Level: Maximum assistance  Skilled Clinical Factors: Mod A to thread cbrief over feet, Dependent for over hips, standing with grab bar, pants not completed due to nursing planning to straight cath  Putting On/Taking Off Footwear  Assistance Level: Dependent  Skilled Clinical Factors: dependent for triston hose, Mod A with sock aid for non  socks  Toileting  Skilled Clinical Factors: incont of BM, dependent to clean buttocks, standing in front of sink    Speech therapy:    ADULT DIET; Regular        Body mass index is 23.26 kg/m².    Rehabilitation Diagnosis:   2.22, Traumatic, closed injury        Assessment and Plan:     Impairments: generalized weakness + baseline right hemiparesis and sensory deficit, decreased balance, endurance, cognition     Traumatic R parietal SAH   -Due to mechanical ground level fall (1/6)  -Managed non-operatively  -Holding home ASA but has been cleared by

## 2024-02-11 NOTE — FLOWSHEET NOTE
Patient admitted to ARU with dx of SAH s/p mechanical fall at home.  A/Ox4. Transfers with stedy x1-2. Mobility restrictions: right side flaccid, AFO for right foot. On regular diet, tolerating well. Medications taken whole with thins. On lovenox, MANAN hose for DVT prophylaxis. (Refused to have it on today) Leg elevated while in recliner but patient lowered it himself. Reminded to keep his right arm elevated for comfort/edema. .Skin: buttocks red, skin tear RFA. Oxygen: RA. LDA: none. Has been continent of bowel, on routine straight cath d/t urinary retention which is his baseline at home. LBM 2/9. On routine and prn pain meds for chronic pain on his right groin, hip and leg. Chair/bed alarms in use and call light within reach.

## 2024-02-11 NOTE — PROGRESS NOTES
Patient admitted to rehab with Subarachnoid Hemorrhage.  A/Ox4. Transfers with stedy and gait belt x1. Mobility restrictions: right hip pain, right side flaccid pain has had a reduction in pain. On Regular diet, tolerating well. Medications taken whole with thin liquids. On Lovenox for DVT prophylaxis.  Skin: Reddened coccyx, resolving . Oxygen: RA. LDA: none. Has been continent of bowel and bladder, straight cath every 6 hours. LBM 2/10/2024, continuing to take senokot. Chair/bed alarms in use and call light in reach. Will monitor for safety. Electronically signed by Juliana Montano RN on 2/11/2024 at 5:04 PM

## 2024-02-11 NOTE — PLAN OF CARE
Problem: Discharge Planning  Goal: Discharge to home or other facility with appropriate resources  2/10/2024 2135 by Silvana De Jesus RN  Outcome: Progressing  2/10/2024 1427 by Juliana Montano RN  Outcome: Progressing     Problem: Safety - Adult  Goal: Free from fall injury  2/10/2024 2135 by Silvana De Jesus RN  Outcome: Progressing  2/10/2024 1427 by Juliana Montano RN  Outcome: Progressing     Problem: ABCDS Injury Assessment  Goal: Absence of physical injury  2/10/2024 2135 by Silvana De Jesus RN  Outcome: Progressing  2/10/2024 1427 by Juliana Montano RN  Outcome: Progressing     Problem: Pain  Goal: Verbalizes/displays adequate comfort level or baseline comfort level  2/10/2024 2135 by Silvana De Jesus RN  Outcome: Progressing  2/10/2024 1427 by Juliana Montano RN  Outcome: Progressing     Problem: Nutrition Deficit:  Goal: Optimize nutritional status  2/10/2024 2135 by Silvana De Jesus RN  Outcome: Progressing  2/10/2024 1427 by Juliana Montano RN  Outcome: Progressing     Problem: Skin/Tissue Integrity  Goal: Absence of new skin breakdown  Description: 1.  Monitor for areas of redness and/or skin breakdown  2.  Assess vascular access sites hourly  3.  Every 4-6 hours minimum:  Change oxygen saturation probe site  4.  Every 4-6 hours:  If on nasal continuous positive airway pressure, respiratory therapy assess nares and determine need for appliance change or resting period.  2/10/2024 2135 by Silvana De Jesus RN  Outcome: Progressing  2/10/2024 1427 by Juliana Montano RN  Outcome: Progressing     Problem: Musculoskeletal - Adult  Goal: Return mobility to safest level of function  2/10/2024 2135 by Silvana De Jesus RN  Outcome: Progressing  2/10/2024 1427 by Juliana Montano RN  Outcome: Progressing  Goal: Maintain proper alignment of affected body part  2/10/2024 2135 by Silvana De Jesus RN  Outcome: Progressing  2/10/2024 1427 by Juliana Montano RN  Outcome:
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
## 2024-02-11 NOTE — PLAN OF CARE
Problem: Discharge Planning  Goal: Discharge to home or other facility with appropriate resources  2/11/2024 0706 by Juliana Montano RN  Outcome: Progressing     Problem: Safety - Adult  Goal: Free from fall injury  2/11/2024 0706 by Juliana Montano RN  Outcome: Progressing     Problem: ABCDS Injury Assessment  Goal: Absence of physical injury  2/11/2024 0706 by Juliana Montano RN  Outcome: Progressing     Problem: Pain  Goal: Verbalizes/displays adequate comfort level or baseline comfort level  2/11/2024 0706 by Juliana Montano RN  Outcome: Progressing     Problem: Nutrition Deficit:  Goal: Optimize nutritional status  2/11/2024 0706 by Juliana Montano RN  Outcome: Progressing     Problem: Skin/Tissue Integrity  Goal: Absence of new skin breakdown  Description: 1.  Monitor for areas of redness and/or skin breakdown  2.  Assess vascular access sites hourly  3.  Every 4-6 hours minimum:  Change oxygen saturation probe site  4.  Every 4-6 hours:  If on nasal continuous positive airway pressure, respiratory therapy assess nares and determine need for appliance change or resting period.  2/11/2024 0706 by Juliana Montano RN  Outcome: Progressing     Problem: Musculoskeletal - Adult  Goal: Return mobility to safest level of function  2/11/2024 0706 by Juliana Montano RN  Outcome: Progressing     Problem: Musculoskeletal - Adult  Goal: Maintain proper alignment of affected body part  2/11/2024 0706 by Juliana Montano RN  Outcome: Progressing     Problem: Musculoskeletal - Adult  Goal: Return ADL status to a safe level of function  2/11/2024 0706 by Juliana Montano RN  Outcome: Progressing     Problem: Genitourinary - Adult  Goal: Absence of urinary retention  2/11/2024 0706 by Juliana Montano RN  Outcome: Progressing

## 2024-02-11 NOTE — PROGRESS NOTES
Pt requested to sleep this morning, used call light at 1040, stated he was ready to get up, slept well and was thankful for an uninterrupted morning. Scanned bladder to be 732, straight cathed 800 ml out at 1058. TEDS placed prior to getting out of bed. At present, pt has received the Eucharist, and placed his meal orders for tomorrow, is in recliner and awaiting lunch. Will continue to monitor. Electronically signed by Juliana Montano RN on 2/11/2024 at 11:15 AM

## 2024-02-12 LAB
ANION GAP SERPL CALCULATED.3IONS-SCNC: 5 MMOL/L (ref 3–16)
BASOPHILS # BLD: 0 K/UL (ref 0–0.2)
BASOPHILS NFR BLD: 0.7 %
BUN SERPL-MCNC: 15 MG/DL (ref 7–20)
CALCIUM SERPL-MCNC: 8.4 MG/DL (ref 8.3–10.6)
CHLORIDE SERPL-SCNC: 105 MMOL/L (ref 99–110)
CO2 SERPL-SCNC: 31 MMOL/L (ref 21–32)
CREAT SERPL-MCNC: 0.7 MG/DL (ref 0.8–1.3)
DEPRECATED RDW RBC AUTO: 18.6 % (ref 12.4–15.4)
EOSINOPHIL # BLD: 0.2 K/UL (ref 0–0.6)
EOSINOPHIL NFR BLD: 3.9 %
GFR SERPLBLD CREATININE-BSD FMLA CKD-EPI: >60 ML/MIN/{1.73_M2}
GLUCOSE SERPL-MCNC: 81 MG/DL (ref 70–99)
HCT VFR BLD AUTO: 24.2 % (ref 40.5–52.5)
HGB BLD-MCNC: 8.2 G/DL (ref 13.5–17.5)
LYMPHOCYTES # BLD: 1.9 K/UL (ref 1–5.1)
LYMPHOCYTES NFR BLD: 31.6 %
MCH RBC QN AUTO: 32.8 PG (ref 26–34)
MCHC RBC AUTO-ENTMCNC: 34.1 G/DL (ref 31–36)
MCV RBC AUTO: 96.2 FL (ref 80–100)
MONOCYTES # BLD: 0.5 K/UL (ref 0–1.3)
MONOCYTES NFR BLD: 8.2 %
NEUTROPHILS # BLD: 3.3 K/UL (ref 1.7–7.7)
NEUTROPHILS NFR BLD: 55.6 %
PLATELET # BLD AUTO: 315 K/UL (ref 135–450)
PMV BLD AUTO: 7.4 FL (ref 5–10.5)
POTASSIUM SERPL-SCNC: 4.4 MMOL/L (ref 3.5–5.1)
RBC # BLD AUTO: 2.51 M/UL (ref 4.2–5.9)
SODIUM SERPL-SCNC: 141 MMOL/L (ref 136–145)
WBC # BLD AUTO: 6 K/UL (ref 4–11)

## 2024-02-12 PROCEDURE — 97530 THERAPEUTIC ACTIVITIES: CPT

## 2024-02-12 PROCEDURE — 6370000000 HC RX 637 (ALT 250 FOR IP): Performed by: PHYSICAL MEDICINE & REHABILITATION

## 2024-02-12 PROCEDURE — 85025 COMPLETE CBC W/AUTO DIFF WBC: CPT

## 2024-02-12 PROCEDURE — 1280000000 HC REHAB R&B

## 2024-02-12 PROCEDURE — 97110 THERAPEUTIC EXERCISES: CPT

## 2024-02-12 PROCEDURE — 36415 COLL VENOUS BLD VENIPUNCTURE: CPT

## 2024-02-12 PROCEDURE — 94760 N-INVAS EAR/PLS OXIMETRY 1: CPT

## 2024-02-12 PROCEDURE — 6370000000 HC RX 637 (ALT 250 FOR IP): Performed by: STUDENT IN AN ORGANIZED HEALTH CARE EDUCATION/TRAINING PROGRAM

## 2024-02-12 PROCEDURE — 51701 INSERT BLADDER CATHETER: CPT

## 2024-02-12 PROCEDURE — 6360000002 HC RX W HCPCS: Performed by: PHYSICAL MEDICINE & REHABILITATION

## 2024-02-12 PROCEDURE — 51798 US URINE CAPACITY MEASURE: CPT

## 2024-02-12 PROCEDURE — 97535 SELF CARE MNGMENT TRAINING: CPT

## 2024-02-12 PROCEDURE — 80048 BASIC METABOLIC PNL TOTAL CA: CPT

## 2024-02-12 PROCEDURE — 97116 GAIT TRAINING THERAPY: CPT

## 2024-02-12 RX ADMIN — PHENYTOIN SODIUM 100 MG: 100 CAPSULE ORAL at 09:35

## 2024-02-12 RX ADMIN — OXYCODONE HYDROCHLORIDE 10 MG: 10 TABLET, FILM COATED, EXTENDED RELEASE ORAL at 09:35

## 2024-02-12 RX ADMIN — OXYCODONE HYDROCHLORIDE 10 MG: 10 TABLET, FILM COATED, EXTENDED RELEASE ORAL at 21:03

## 2024-02-12 RX ADMIN — SENNOSIDES AND DOCUSATE SODIUM 1 TABLET: 8.6; 5 TABLET ORAL at 21:03

## 2024-02-12 RX ADMIN — ATORVASTATIN CALCIUM 80 MG: 80 TABLET, FILM COATED ORAL at 21:03

## 2024-02-12 RX ADMIN — PHENYTOIN SODIUM 100 MG: 100 CAPSULE ORAL at 15:07

## 2024-02-12 RX ADMIN — CARVEDILOL 3.12 MG: 3.12 TABLET, FILM COATED ORAL at 17:55

## 2024-02-12 RX ADMIN — DICLOFENAC SODIUM 4 G: 10 GEL TOPICAL at 09:34

## 2024-02-12 RX ADMIN — SENNOSIDES AND DOCUSATE SODIUM 1 TABLET: 8.6; 5 TABLET ORAL at 09:35

## 2024-02-12 RX ADMIN — DICLOFENAC SODIUM 4 G: 10 GEL TOPICAL at 17:56

## 2024-02-12 RX ADMIN — ACETAMINOPHEN 1000 MG: 500 TABLET ORAL at 04:44

## 2024-02-12 RX ADMIN — Medication 3 MG: at 21:03

## 2024-02-12 RX ADMIN — BACLOFEN 10 MG: 10 TABLET ORAL at 15:07

## 2024-02-12 RX ADMIN — PHENYTOIN SODIUM 100 MG: 100 CAPSULE ORAL at 21:03

## 2024-02-12 RX ADMIN — DICLOFENAC SODIUM 4 G: 10 GEL TOPICAL at 20:57

## 2024-02-12 RX ADMIN — LAMOTRIGINE 200 MG: 100 TABLET ORAL at 21:03

## 2024-02-12 RX ADMIN — CARVEDILOL 3.12 MG: 3.12 TABLET, FILM COATED ORAL at 09:35

## 2024-02-12 RX ADMIN — DICLOFENAC SODIUM 4 G: 10 GEL TOPICAL at 15:07

## 2024-02-12 RX ADMIN — TAMSULOSIN HYDROCHLORIDE 0.4 MG: 0.4 CAPSULE ORAL at 21:03

## 2024-02-12 RX ADMIN — ENOXAPARIN SODIUM 80 MG: 100 INJECTION SUBCUTANEOUS at 09:32

## 2024-02-12 RX ADMIN — LAMOTRIGINE 200 MG: 100 TABLET ORAL at 09:35

## 2024-02-12 RX ADMIN — BACLOFEN 10 MG: 10 TABLET ORAL at 09:35

## 2024-02-12 RX ADMIN — PANTOPRAZOLE SODIUM 40 MG: 40 TABLET, DELAYED RELEASE ORAL at 17:56

## 2024-02-12 RX ADMIN — PANTOPRAZOLE SODIUM 40 MG: 40 TABLET, DELAYED RELEASE ORAL at 05:05

## 2024-02-12 RX ADMIN — ENOXAPARIN SODIUM 80 MG: 100 INJECTION SUBCUTANEOUS at 21:02

## 2024-02-12 RX ADMIN — Medication 400 MG: at 09:35

## 2024-02-12 RX ADMIN — ACETAMINOPHEN 1000 MG: 500 TABLET ORAL at 15:07

## 2024-02-12 RX ADMIN — ACETAMINOPHEN 1000 MG: 500 TABLET ORAL at 21:03

## 2024-02-12 RX ADMIN — BACLOFEN 10 MG: 10 TABLET ORAL at 21:03

## 2024-02-12 ASSESSMENT — PAIN SCALES - WONG BAKER
WONGBAKER_NUMERICALRESPONSE: 0

## 2024-02-12 ASSESSMENT — PAIN DESCRIPTION - DESCRIPTORS
DESCRIPTORS: DISCOMFORT
DESCRIPTORS: DISCOMFORT

## 2024-02-12 ASSESSMENT — PAIN DESCRIPTION - ORIENTATION: ORIENTATION: RIGHT

## 2024-02-12 ASSESSMENT — PAIN DESCRIPTION - LOCATION
LOCATION: HIP
LOCATION: HIP;LEG;GROIN

## 2024-02-12 ASSESSMENT — PAIN DESCRIPTION - FREQUENCY: FREQUENCY: INTERMITTENT

## 2024-02-12 ASSESSMENT — PAIN SCALES - GENERAL
PAINLEVEL_OUTOF10: 7
PAINLEVEL_OUTOF10: 5
PAINLEVEL_OUTOF10: 0
PAINLEVEL_OUTOF10: 0

## 2024-02-12 ASSESSMENT — PAIN DESCRIPTION - PAIN TYPE: TYPE: CHRONIC PAIN

## 2024-02-12 ASSESSMENT — PAIN - FUNCTIONAL ASSESSMENT: PAIN_FUNCTIONAL_ASSESSMENT: PREVENTS OR INTERFERES WITH MANY ACTIVE NOT PASSIVE ACTIVITIES

## 2024-02-12 ASSESSMENT — PAIN DESCRIPTION - ONSET: ONSET: ON-GOING

## 2024-02-12 NOTE — PROGRESS NOTES
Patient admitted to rehab with subarachnoid hemorrhage.  A/Ox4. Transfers with stedy x1. Mobility restrictions: right side flaccid, AFO for right foot. On regular diet, tolerating well. Medications taken whole with thins. On lovenox, triston hose for DVT prophylaxis.  Skin: redness to buttocks, skin tear RFA. Oxygen: RA. LDA: none. Has been continent of bowel and continent of bladder. LBM 2/10/24. Chair/bed alarms in use and call light in reach. Will monitor for safety. Electronically signed by Lidia Pederson RN on 2/12/2024 at 2:47 PM

## 2024-02-12 NOTE — PLAN OF CARE
Problem: Discharge Planning  Goal: Discharge to home or other facility with appropriate resources  2/12/2024 1144 by Lidia Pederson RN  Outcome: Progressing  Flowsheets (Taken 2/12/2024 0925)  Discharge to home or other facility with appropriate resources:   Identify barriers to discharge with patient and caregiver   Arrange for needed discharge resources and transportation as appropriate   Identify discharge learning needs (meds, wound care, etc)   Refer to discharge planning if patient needs post-hospital services based on physician order or complex needs related to functional status, cognitive ability or social support system     Problem: Safety - Adult  Goal: Free from fall injury  2/12/2024 1144 by Lidia Pederson RN  Outcome: Progressing  Flowsheets (Taken 2/12/2024 0924)  Free From Fall Injury: Instruct family/caregiver on patient safety     Problem: ABCDS Injury Assessment  Goal: Absence of physical injury  2/12/2024 1144 by Lidia Pederson RN  Outcome: Progressing  Flowsheets (Taken 2/12/2024 0924)  Absence of Physical Injury: Implement safety measures based on patient assessment     Problem: Pain  Goal: Verbalizes/displays adequate comfort level or baseline comfort level  2/12/2024 1144 by Lidia Pederson RN  Outcome: Progressing  Flowsheets (Taken 2/12/2024 0925)  Verbalizes/displays adequate comfort level or baseline comfort level:   Encourage patient to monitor pain and request assistance   Assess pain using appropriate pain scale   Administer analgesics based on type and severity of pain and evaluate response   Implement non-pharmacological measures as appropriate and evaluate response     Problem: Nutrition Deficit:  Goal: Optimize nutritional status  2/12/2024 1144 by Lidia Pederson RN  Outcome: Progressing     Problem: Skin/Tissue Integrity  Goal: Absence of new skin breakdown  Description: 1.  Monitor for areas of redness and/or skin breakdown  2.  Assess vascular access sites hourly  3.   Every 4-6 hours minimum:  Change oxygen saturation probe site  4.  Every 4-6 hours:  If on nasal continuous positive airway pressure, respiratory therapy assess nares and determine need for appliance change or resting period.  2/12/2024 1144 by Lidia Pederson RN  Outcome: Progressing
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
## 2024-02-12 NOTE — PROGRESS NOTES
Physical Therapy  Facility/Department: [address] REHAB  Rehabilitation Physical Therapy Treatment Note    NAME: Eric Ovalles  : 1945 (78 y.o.)  MRN: [id number]  CODE STATUS: Full Code    Date of Service: 24       Restrictions:  Restrictions/Precautions: Aspiration Risk, Fall Risk  Position Activity Restriction  Other position/activity restrictions: intermittant catheterization as PTA     SUBJECTIVE  Subjective  Subjective: Pt slept well last night  Pain: reports pain is manageable in R groin, has  not taken short acting pain meds     OBJECTIVE  Cognition  Overall Cognitive Status: Exceptions  Attention Span: Appears intact  Safety Judgement: Decreased awareness of need for safety  Problem Solving: Assistance required to implement solutions;Assistance required to generate solutions  Insights: Decreased awareness of deficits  Cognition Comment: needs extra time to problem solve for transfers/mobility tasks  Orientation  Overall Orientation Status: Within Functional Limits    Functional Mobility  Bed Mobility  Overall Assistance Level: Contact Guard Assist  Bridging  Assistance Level: Stand by assist  Skilled Clinical Factors: very small lift, luba to use L LE only, able to scootin toward the middle of the bed  Roll Left  Skilled Clinical Factors: unable: pt states he did not roll to his L even before this hospitilization and pain in L hip  Roll Right  Assistance Level: Supervision  Sit to Supine  Assistance Level: Minimal assistance  Skilled Clinical Factors: with R LE only  Supine to Sit  Assistance Level: Stand by assist  Skilled Clinical Factors: with effort  Scooting  Assistance Level: Stand by assist  Skilled Clinical Factors: rolls toward his R hip and scoots himself over  Balance  Sitting Balance: Supervision  Standing Balance: Contact guard assistance  Transfers  Surface: Wheelchair  Additional Factors: Increased time to complete;Set-up;Verbal cues  Device: Cane (LBQC)  Sit to

## 2024-02-12 NOTE — PROGRESS NOTES
Department of Physical Medicine & Rehabilitation  Progress Note    Patient Identification:  Eric Ovalles  [id number]  : 1945  Admit date: 2024    Chief Complaint: Subarachnoid hemorrhage (HCC)    Subjective:   No acute events overnight.   Patient seen this afternoon sitting up in gym. Reports ongoing steady progress. Wife concerned about ability to return home at the end of the week at current functional level. We discussed extending rehab stay vs potential SNF. [facility].    Labs reviewed.     ROS: No f/c, n/v, cp     Objective:  Patient Vitals for the past 24 hrs:   BP Temp Temp src Pulse Resp SpO2 Weight   24 0925 (!) 147/72 97.7 °F (36.5 °C) Oral 90 17 96 % --   24 0823 -- -- -- -- -- 98 % --   24 0506 -- -- -- -- -- -- 81.8 kg (180 lb 5.4 oz)   24 -- -- -- -- 16 -- --   24 -- -- -- -- 16 -- --   24 1911 131/74 98 °F (36.7 °C) Oral 85 18 99 % --   24 1732 119/65 -- -- 81 16 98 % --   24 1326 -- -- -- -- 16 -- --   24 1255 123/66 97.9 °F (36.6 °C) Oral 91 18 96 % --     Const: Alert. No distress, pleasant.   HEENT: Normocephalic, atraumatic. Normal sclera/conjunctiva. MMM.   CV: Regular rate and rhythm.   Resp: No respiratory distress. Lungs distant/diminished at bases   Abd: Soft, nontender, nondistended, NABS+   Ext: +RUE/RLE edema  MSK: right hip ROM limited   Neuro: Alert, oriented, appropriately interactive. Mildly impaired recall.   Psych: Cooperative, appropriate mood and affect    Laboratory data: Available via EMR.   Last 24 hour lab  Recent Results (from the past 24 hour(s))   CBC with Auto Differential    Collection Time: 24  5:29 AM   Result Value Ref Range    WBC 6.0 4.0 - 11.0 K/uL    RBC 2.51 (L) 4.20 - 5.90 M/uL    Hemoglobin 8.2 (L) 13.5 - 17.5 g/dL    Hematocrit 24.2 (L) 40.5 - 52.5 %    MCV 96.2 80.0 - 100.0 fL    MCH 32.8 26.0 - 34.0 pg    MCHC 34.1 31.0 - 36.0 g/dL     HH PT, OT, RN, ? SLP  DME: trevor SALDANA: 2/16 -- may need to extend stay      Padmini Marie MD 2/12/2024, 12:24 PM
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
## 2024-02-12 NOTE — FLOWSHEET NOTE
Patient admitted to ARU with dx of SAH after a mechanical fall at home.  A/Ox4. Transfers with stedy x1-2 with a gait belt. Mobility restrictions: right hip pain, right side flaccid pain. On routine pain meds. Not needing prn pain meds at this time. Prefers to stay on his recliner late at night. Offered to transfer back to bed. Deferred at this time. On Regular diet, tolerating well. Medications taken whole with thin liquids. On Lovenox for DVT prophylaxis.  Skin: Reddened coccyx, zinc cream applied. On chair cushion for protection and comfort. Oxygen: RA. LDA: none. Has been continent of bowel and bladder, straight cath every 6 hours. LBM 2/10/2024. Chair/bed alarms in use and call light within reach.

## 2024-02-13 PROCEDURE — 6370000000 HC RX 637 (ALT 250 FOR IP): Performed by: STUDENT IN AN ORGANIZED HEALTH CARE EDUCATION/TRAINING PROGRAM

## 2024-02-13 PROCEDURE — 6370000000 HC RX 637 (ALT 250 FOR IP): Performed by: PHYSICAL MEDICINE & REHABILITATION

## 2024-02-13 PROCEDURE — 51701 INSERT BLADDER CATHETER: CPT

## 2024-02-13 PROCEDURE — 97110 THERAPEUTIC EXERCISES: CPT

## 2024-02-13 PROCEDURE — 1280000000 HC REHAB R&B

## 2024-02-13 PROCEDURE — 94760 N-INVAS EAR/PLS OXIMETRY 1: CPT

## 2024-02-13 PROCEDURE — 6360000002 HC RX W HCPCS: Performed by: PHYSICAL MEDICINE & REHABILITATION

## 2024-02-13 PROCEDURE — 97530 THERAPEUTIC ACTIVITIES: CPT

## 2024-02-13 PROCEDURE — 51798 US URINE CAPACITY MEASURE: CPT

## 2024-02-13 PROCEDURE — 97116 GAIT TRAINING THERAPY: CPT

## 2024-02-13 RX ADMIN — SENNOSIDES AND DOCUSATE SODIUM 1 TABLET: 8.6; 5 TABLET ORAL at 08:47

## 2024-02-13 RX ADMIN — SENNOSIDES AND DOCUSATE SODIUM 1 TABLET: 8.6; 5 TABLET ORAL at 21:07

## 2024-02-13 RX ADMIN — ENOXAPARIN SODIUM 80 MG: 100 INJECTION SUBCUTANEOUS at 08:45

## 2024-02-13 RX ADMIN — PANTOPRAZOLE SODIUM 40 MG: 40 TABLET, DELAYED RELEASE ORAL at 18:10

## 2024-02-13 RX ADMIN — PHENYTOIN SODIUM 100 MG: 100 CAPSULE ORAL at 21:07

## 2024-02-13 RX ADMIN — ACETAMINOPHEN 1000 MG: 500 TABLET ORAL at 06:09

## 2024-02-13 RX ADMIN — DICLOFENAC SODIUM 4 G: 10 GEL TOPICAL at 13:12

## 2024-02-13 RX ADMIN — DICLOFENAC SODIUM 4 G: 10 GEL TOPICAL at 21:05

## 2024-02-13 RX ADMIN — BACLOFEN 10 MG: 10 TABLET ORAL at 08:48

## 2024-02-13 RX ADMIN — BACLOFEN 10 MG: 10 TABLET ORAL at 13:10

## 2024-02-13 RX ADMIN — PHENYTOIN SODIUM 100 MG: 100 CAPSULE ORAL at 13:10

## 2024-02-13 RX ADMIN — LAMOTRIGINE 200 MG: 100 TABLET ORAL at 21:07

## 2024-02-13 RX ADMIN — OXYCODONE HYDROCHLORIDE 10 MG: 10 TABLET, FILM COATED, EXTENDED RELEASE ORAL at 21:07

## 2024-02-13 RX ADMIN — ACETAMINOPHEN 1000 MG: 500 TABLET ORAL at 13:10

## 2024-02-13 RX ADMIN — CARVEDILOL 3.12 MG: 3.12 TABLET, FILM COATED ORAL at 08:47

## 2024-02-13 RX ADMIN — PHENYTOIN SODIUM 100 MG: 100 CAPSULE ORAL at 08:48

## 2024-02-13 RX ADMIN — ENOXAPARIN SODIUM 80 MG: 100 INJECTION SUBCUTANEOUS at 21:05

## 2024-02-13 RX ADMIN — LAMOTRIGINE 200 MG: 100 TABLET ORAL at 08:48

## 2024-02-13 RX ADMIN — ATORVASTATIN CALCIUM 80 MG: 80 TABLET, FILM COATED ORAL at 21:07

## 2024-02-13 RX ADMIN — OXYCODONE HYDROCHLORIDE 10 MG: 10 TABLET, FILM COATED, EXTENDED RELEASE ORAL at 08:48

## 2024-02-13 RX ADMIN — DICLOFENAC SODIUM 4 G: 10 GEL TOPICAL at 18:09

## 2024-02-13 RX ADMIN — PANTOPRAZOLE SODIUM 40 MG: 40 TABLET, DELAYED RELEASE ORAL at 06:09

## 2024-02-13 RX ADMIN — Medication 400 MG: at 08:48

## 2024-02-13 RX ADMIN — TAMSULOSIN HYDROCHLORIDE 0.4 MG: 0.4 CAPSULE ORAL at 21:06

## 2024-02-13 RX ADMIN — BACLOFEN 10 MG: 10 TABLET ORAL at 21:06

## 2024-02-13 RX ADMIN — Medication 3 MG: at 21:06

## 2024-02-13 RX ADMIN — DICLOFENAC SODIUM 4 G: 10 GEL TOPICAL at 08:47

## 2024-02-13 RX ADMIN — ACETAMINOPHEN 1000 MG: 500 TABLET ORAL at 21:07

## 2024-02-13 RX ADMIN — CARVEDILOL 3.12 MG: 3.12 TABLET, FILM COATED ORAL at 18:10

## 2024-02-13 ASSESSMENT — PAIN SCALES - GENERAL
PAINLEVEL_OUTOF10: 0
PAINLEVEL_OUTOF10: 7
PAINLEVEL_OUTOF10: 4
PAINLEVEL_OUTOF10: 6
PAINLEVEL_OUTOF10: 0

## 2024-02-13 ASSESSMENT — PAIN DESCRIPTION - FREQUENCY
FREQUENCY: INTERMITTENT
FREQUENCY: INTERMITTENT

## 2024-02-13 ASSESSMENT — PAIN DESCRIPTION - PAIN TYPE
TYPE: CHRONIC PAIN
TYPE: CHRONIC PAIN

## 2024-02-13 ASSESSMENT — PAIN DESCRIPTION - LOCATION
LOCATION: HIP
LOCATION: HIP

## 2024-02-13 ASSESSMENT — PAIN DESCRIPTION - DESCRIPTORS
DESCRIPTORS: DISCOMFORT
DESCRIPTORS: DISCOMFORT

## 2024-02-13 ASSESSMENT — PAIN DESCRIPTION - ORIENTATION
ORIENTATION: RIGHT
ORIENTATION: RIGHT

## 2024-02-13 ASSESSMENT — PAIN DESCRIPTION - ONSET
ONSET: ON-GOING
ONSET: ON-GOING

## 2024-02-13 NOTE — PROGRESS NOTES
Physical Therapy  Facility/Department: [address] RE[facility]  Rehabilitation Physical Therapy Treatment Note    NAME: Eric Ovalles  : 1945 (78 y.o.)  MRN: [id number]  CODE STATUS: Full Code    Date of Service: 24       Restrictions:  Restrictions/Precautions: Aspiration Risk, Fall Risk  Position Activity Restriction  Other position/activity restrictions: intermittant catheterization as PTA     SUBJECTIVE  Subjective  Subjective: Pt feels good today  Pain: reports pain is manageable in R groin      OBJECTIVE  Cognition  Overall Cognitive Status: Exceptions  Attention Span: Appears intact  Safety Judgement: Decreased awareness of need for safety  Problem Solving: Assistance required to implement solutions;Assistance required to generate solutions  Insights: Decreased awareness of deficits  Cognition Comment: needs extra time to problem solve for transfers/mobility tasks  Orientation  Overall Orientation Status: Within Functional Limits    Functional Mobility  Transfers  Surface: Wheelchair  Additional Factors: Increased time to complete;Set-up;Verbal cues  Device: Cane (LBQC)  Sit to Stand  Assistance Level: Contact guard assist;Stand by assist (close SBA, extra time to complete)  Skilled Clinical Factors: stand to LBQC, first time CGA< then close SBA with extra time  Stand to Sit  Assistance Level: Stand by assist      Environmental Mobility  Ambulation  Surface: Level surface  Device: Quad Cane  Distance: 168' and 55' with LBQC, better pace and pattern, once instance of R foot dragging and caused pt to need CGA to min A due to forward imbalance  Activity: Within Unit  Additional Factors: Right AFO;Set-up;Increased time to complete  Assistance Level: Contact guard assist;Minimal assistance (CGA except for one brief instance of min A whrn pt's r foot drug on the floor)  Gait Deviations: Slow butch;Decreased step length bilateral;Decreased weight shift right;Narrow base of support;Unsteady  LE.  Sitting AP with L LE, TKE B (smaller range on R), hamstring curls with green theraband on L and with scooter under R foot for small range, add sets, green theraband abduction with L LE abducting and R LE resisting, L hip marching, all ex x 15.  Returned to room, stand pivot transfer with LBQC close SBA, no LOB.   A/P: terri well, pt performing most transfers today with SBA and extra effort.  Second Session Therapy Time     Individual Co-treatment   Time In 1400     Time Out 1500     Minutes 60        Electronically signed by Angy Marquez, PT on 2/13/2024 at 3:02 PM
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
## 2024-02-13 NOTE — PROGRESS NOTES
Patient admitted to rehab with subarachnoid hemorrhage.  A/Ox4. Transfers with stedy. Mobility restrictions: right side flaccid, AFO for right foot. On regular diet, tolerating well. Medications taken whole with thins. On lovenox, triston hose for DVT prophylaxis.  Skin: buttocks red, skin tear RFA. Oxygen: RA. LDA: none. Has been continent of bowel and continent of bladder. LBM 2/12/24. Chair/bed alarms in use and call light in reach. Will monitor for safety. Electronically signed by Lidia Pederson RN on 2/13/2024 at 10:36 AM

## 2024-02-13 NOTE — PROGRESS NOTES
Resting quietly in bed, respirations even/easy. Patient admitted with a SAH after a fall at home. Also with a hx of TBI with right sided weakness. Pt transferred last evening with a stedy x1. Mod-max assist to stand from the toilet. Lungs CTA, HR regular. Abdomen non tender with active bowel sounds. Has been continent of bowel and requiring straight cath for bladder. Pt c/o chronic right hip pain and medicated per routine orders. Encouraged to call for all needs, call light remains in reach at all times. Bed alarm active. Justa Landa RN
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
## 2024-02-13 NOTE — PLAN OF CARE
Problem: Safety - Adult  Goal: Free from fall injury  2/13/2024 0122 by Justa Landa, RN  Outcome: Progressing  Flowsheets (Taken 2/13/2024 0122)  Free From Fall Injury:   Instruct family/caregiver on patient safety   Based on caregiver fall risk screen, instruct family/caregiver to ask for assistance with transferring infant if caregiver noted to have fall risk factors  Note: Pt educated on falls precautions and safety. Call light and personal belongings within reach at all times. Non skid socks in use when up. Hourly rounding and alarms active.      Problem: Skin/Tissue Integrity  Goal: Absence of new skin breakdown  Description: 1.  Monitor for areas of redness and/or skin breakdown  2.  Assess vascular access sites hourly  3.  Every 4-6 hours minimum:  Change oxygen saturation probe site  4.  Every 4-6 hours:  If on nasal continuous positive airway pressure, respiratory therapy assess nares and determine need for appliance change or resting period.  2/13/2024 0122 by Justa Landa, RN  Outcome: Progressing  Note: Able to change positions in bed without assist, no evidence of skin breakdown noted. Waffle cushion in place to chair.

## 2024-02-13 NOTE — CARE COORDINATION
Team Conference held today.  Team reviewed barriers:  Right Gerber, limited physical from wife at home.  DME recs:  1/2 bed rail, two ramps for one step each, transport chair, Reclining lift chair.  DC is planned for Friday, 2-.  Home Care is recommended for SN/PT/OT services.    Met with pt and wife to review.  Wife reports he has a PCP appt with Dr Grider on Wed 2- at 11:00 am and [facility].  Informed her that typically, we ask that PCP appts are rescheduled to after his release but will check with Dr Marie about this appt.  She is concerned that he should have his B12 injection which was due on 2-1-2024.  Informed bedside RN, Lidia and left sticky note for Dr Marie.    Wife informs me that she needs to get to Cardiac Rehab soon after his release and she does not want to leave him alone.   Suggested [facility] for Respite stay and she denied this stating she does not want anything to do with [facility] due to a prior experience with her mother.  Suggested we ask [facility] any [facility] stays in the home.     Discussion held regarding DME recs:  She reports she already had informed Dr Grider's RN, Lary [phone] ext [phone] of need for ramps and 1/2 bed rail.      She asks that I call [employer] to review and send the referral to them for coverage from [facility].  Message left for Lary requesting call back to discuss DME, home care and respite stay for pt while wife goes to her Cardiac rehab.     Discussion held regarding home care orders for SN/PT/OT.  She reports she herself was using [facility] and would want this for him as well but states we will need to go through [facility] this as well.    Next Team Conference will be Tuesday, 2-.    NICK Milner     Case Management   [phone]    2/13/2024  4:46 PM

## 2024-02-14 PROCEDURE — 97110 THERAPEUTIC EXERCISES: CPT

## 2024-02-14 PROCEDURE — 97530 THERAPEUTIC ACTIVITIES: CPT | Performed by: PHYSICAL THERAPIST

## 2024-02-14 PROCEDURE — 6370000000 HC RX 637 (ALT 250 FOR IP): Performed by: STUDENT IN AN ORGANIZED HEALTH CARE EDUCATION/TRAINING PROGRAM

## 2024-02-14 PROCEDURE — 97116 GAIT TRAINING THERAPY: CPT | Performed by: PHYSICAL THERAPIST

## 2024-02-14 PROCEDURE — 1280000000 HC REHAB R&B

## 2024-02-14 PROCEDURE — 97110 THERAPEUTIC EXERCISES: CPT | Performed by: PHYSICAL THERAPIST

## 2024-02-14 PROCEDURE — 6370000000 HC RX 637 (ALT 250 FOR IP): Performed by: PHYSICAL MEDICINE & REHABILITATION

## 2024-02-14 PROCEDURE — 94760 N-INVAS EAR/PLS OXIMETRY 1: CPT

## 2024-02-14 PROCEDURE — 97530 THERAPEUTIC ACTIVITIES: CPT

## 2024-02-14 PROCEDURE — 6360000002 HC RX W HCPCS: Performed by: PHYSICAL MEDICINE & REHABILITATION

## 2024-02-14 PROCEDURE — 51798 US URINE CAPACITY MEASURE: CPT

## 2024-02-14 PROCEDURE — 51701 INSERT BLADDER CATHETER: CPT

## 2024-02-14 PROCEDURE — 97535 SELF CARE MNGMENT TRAINING: CPT

## 2024-02-14 RX ADMIN — SENNOSIDES AND DOCUSATE SODIUM 1 TABLET: 8.6; 5 TABLET ORAL at 07:32

## 2024-02-14 RX ADMIN — PHENYTOIN SODIUM 100 MG: 100 CAPSULE ORAL at 21:06

## 2024-02-14 RX ADMIN — PANTOPRAZOLE SODIUM 40 MG: 40 TABLET, DELAYED RELEASE ORAL at 15:57

## 2024-02-14 RX ADMIN — Medication 400 MG: at 07:31

## 2024-02-14 RX ADMIN — LAMOTRIGINE 200 MG: 100 TABLET ORAL at 07:32

## 2024-02-14 RX ADMIN — PHENYTOIN SODIUM 100 MG: 100 CAPSULE ORAL at 07:32

## 2024-02-14 RX ADMIN — ACETAMINOPHEN 1000 MG: 500 TABLET ORAL at 06:13

## 2024-02-14 RX ADMIN — Medication 3 MG: at 21:06

## 2024-02-14 RX ADMIN — BACLOFEN 10 MG: 10 TABLET ORAL at 21:06

## 2024-02-14 RX ADMIN — OXYCODONE HYDROCHLORIDE 10 MG: 10 TABLET, FILM COATED, EXTENDED RELEASE ORAL at 21:06

## 2024-02-14 RX ADMIN — SENNOSIDES AND DOCUSATE SODIUM 1 TABLET: 8.6; 5 TABLET ORAL at 21:06

## 2024-02-14 RX ADMIN — ENOXAPARIN SODIUM 80 MG: 100 INJECTION SUBCUTANEOUS at 21:04

## 2024-02-14 RX ADMIN — LAMOTRIGINE 200 MG: 100 TABLET ORAL at 21:06

## 2024-02-14 RX ADMIN — TAMSULOSIN HYDROCHLORIDE 0.4 MG: 0.4 CAPSULE ORAL at 21:06

## 2024-02-14 RX ADMIN — DICLOFENAC SODIUM 4 G: 10 GEL TOPICAL at 15:08

## 2024-02-14 RX ADMIN — PHENYTOIN SODIUM 100 MG: 100 CAPSULE ORAL at 13:07

## 2024-02-14 RX ADMIN — BACLOFEN 10 MG: 10 TABLET ORAL at 13:06

## 2024-02-14 RX ADMIN — CARVEDILOL 3.12 MG: 3.12 TABLET, FILM COATED ORAL at 16:38

## 2024-02-14 RX ADMIN — ACETAMINOPHEN 1000 MG: 500 TABLET ORAL at 21:06

## 2024-02-14 RX ADMIN — DICLOFENAC SODIUM 4 G: 10 GEL TOPICAL at 11:01

## 2024-02-14 RX ADMIN — ATORVASTATIN CALCIUM 80 MG: 80 TABLET, FILM COATED ORAL at 21:06

## 2024-02-14 RX ADMIN — ENOXAPARIN SODIUM 80 MG: 100 INJECTION SUBCUTANEOUS at 07:39

## 2024-02-14 RX ADMIN — BACLOFEN 10 MG: 10 TABLET ORAL at 07:33

## 2024-02-14 RX ADMIN — ACETAMINOPHEN 1000 MG: 500 TABLET ORAL at 13:06

## 2024-02-14 RX ADMIN — CARVEDILOL 3.12 MG: 3.12 TABLET, FILM COATED ORAL at 07:31

## 2024-02-14 RX ADMIN — PANTOPRAZOLE SODIUM 40 MG: 40 TABLET, DELAYED RELEASE ORAL at 06:13

## 2024-02-14 RX ADMIN — OXYCODONE HYDROCHLORIDE 10 MG: 10 TABLET, FILM COATED, EXTENDED RELEASE ORAL at 07:37

## 2024-02-14 ASSESSMENT — PAIN SCALES - GENERAL
PAINLEVEL_OUTOF10: 6
PAINLEVEL_OUTOF10: 0
PAINLEVEL_OUTOF10: 5
PAINLEVEL_OUTOF10: 7
PAINLEVEL_OUTOF10: 0
PAINLEVEL_OUTOF10: 0

## 2024-02-14 ASSESSMENT — PAIN DESCRIPTION - FREQUENCY
FREQUENCY: INTERMITTENT
FREQUENCY: INTERMITTENT

## 2024-02-14 ASSESSMENT — PAIN DESCRIPTION - ORIENTATION
ORIENTATION: RIGHT
ORIENTATION: RIGHT

## 2024-02-14 ASSESSMENT — PAIN DESCRIPTION - LOCATION
LOCATION: HIP
LOCATION: HIP

## 2024-02-14 ASSESSMENT — PAIN DESCRIPTION - PAIN TYPE
TYPE: CHRONIC PAIN
TYPE: CHRONIC PAIN

## 2024-02-14 ASSESSMENT — PAIN DESCRIPTION - DESCRIPTORS
DESCRIPTORS: DISCOMFORT
DESCRIPTORS: DISCOMFORT

## 2024-02-14 ASSESSMENT — PAIN DESCRIPTION - ONSET
ONSET: ON-GOING
ONSET: ON-GOING
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
## 2024-02-14 NOTE — CARE COORDINATION
Rec'd call from Lary Acevedo at Dr Grider's office who [facility].  She reports we will need to complete a Form and [facility] to [facility] for [facility].  She will order the home care for SN/PT/OT/Respite Aide to cover for wife while she goes to Cardiac rehab.  She will also order the ramp and 1/2 bed rail.  She will not cover a reclining lift chair but she does a lift seat that would sit under the cushion of a chair and she will discuss with wife about this.    She does confirm MD appt with Dr Grider which is his yearly pcp appt for Wed 2- at 11:00 am.      She needs to know if we are approving of this appt and [facility] can provide transport.    She would like scripts and DC summary to be emailed to her at:   [email or web address] to expedite quicker any scripts Dr Marie is recommending.    Recap of Job Duties:    Transport chair:   alise to fill out [facility] Form [id number] [id number] and [facility] to [facility].  DME:   1/2 bed rail, ramps, reclining tool for a chair, respite aide  and home care to be done by Lary.    NICK Milner     Case Management   [phone]    2/14/2024  4:39 PM

## 2024-02-14 NOTE — PROGRESS NOTES
Wife here did confirm she does straight cath twice a day, and he would void in the afternoon,  made aware new order for every 8 hours. She was not giving Lovenox.

## 2024-02-14 NOTE — PROGRESS NOTES
Multiple attempts to get to wheelchair from recliner, assist varies, sitting on edge of chair, reviewed safety concerns with patient and wife. Patient admitted to rehab with SAH s/p fall  A/Ox4. Transfers with stand pivot with or without cane, vary. On regular diet, tolerating fair to well. Medications taken with water, declines to take one at a time. On Lovenox for DVT prophylaxis.  Skin: dressing to skin tear right arm. Last bm 02/12/2024, no void, wife reports did void at home in the afternoon, wife verbalizes proper technique for straight cath at home, aware cath every 8 hours, may have been overflow. Chair/bed alarms in use and call light in reach. Will monitor for safety.

## 2024-02-14 NOTE — PLAN OF CARE
Problem: Discharge Planning  Goal: Discharge to home or other facility with appropriate resources  2/14/2024 1040 by Sudha Collado, RN  Outcome: Progressing  Flowsheets  Taken 2/14/2024 1040  Discharge to home or other facility with appropriate resources: Identify discharge learning needs (meds, wound care, etc)  Taken 2/14/2024 0729  Discharge to home or other facility with appropriate resources:   Identify barriers to discharge with patient and caregiver   Arrange for needed discharge resources and transportation as appropriate   Identify discharge learning needs (meds, wound care, etc)   Refer to discharge planning if patient needs post-hospital services based on physician order or complex needs related to functional status, cognitive ability or social support system  Note: States wife does straight cath and gives Lovenox at home.  2/14/2024 0340 by Justa Landa RN  Outcome: Progressing     Problem: Safety - Adult  Goal: Free from fall injury  2/14/2024 1040 by Sudha Collado RN  Outcome: Progressing  Flowsheets  Taken 2/14/2024 1040  Free From Fall Injury: Instruct family/caregiver on patient safety  Taken 2/14/2024 0915  Free From Fall Injury: Instruct family/caregiver on patient safety  Note: Safety reviewed, wants nurse to step aware for transfer, transfers vary.  2/14/2024 0340 by Justa Landa RN  Outcome: Progressing  Flowsheets (Taken 2/14/2024 0340)  Free From Fall Injury:   Instruct family/caregiver on patient safety   Based on caregiver fall risk screen, instruct family/caregiver to ask for assistance with transferring infant if caregiver noted to have fall risk factors  Note: Pt educated on falls precautions and safety. Call light and personal belongings within reach at all times. Non skid socks in use when up. Hourly rounding and alarms active.      Problem: ABCDS Injury Assessment  Goal: Absence of physical injury  2/14/2024 1040 by Sudha Collado, RN  Outcome:  ordered   Assess activities of daily living deficits and provide assistive devices as needed   Assist and instruct patient to increase activity and self care as tolerated  2/14/2024 0340 by Justa Landa, RN  Outcome: Progressing     Problem: Genitourinary - Adult  Goal: Absence of urinary retention  2/14/2024 1040 by Sudha Collado RN  Outcome: Progressing  Flowsheets  Taken 2/14/2024 1040  Absence of urinary retention: Assess patient’s ability to void and empty bladder  Taken 2/14/2024 0729  Absence of urinary retention: (does not void wife does cath at home) Assess patient’s ability to void and empty bladder  Note: Straight caths now every 8 hours, states wife does at home.  2/14/2024 0340 by Justa Landa, RN  Outcome: Progressing

## 2024-02-14 NOTE — PROGRESS NOTES
Resting quietly in bed, respirations even/easy. Patient admitted with a SAH after a fall of home. Hx of a TBI with right sided weakness. Patient with +2 RUE and RLE edema. Transferring with a stand pivot or a stedy for fatigue. Lungs CTA, HR regular. Abdomen non tender with active bowel sounds. Requiring straight cath for bladder. Patient tolerated well. C/o chronic pain and medicated per routine orders. Medication effective. Encouraged to call for all needs, call light remains in reach at all times. Bed alarm active. Justa Landa RN

## 2024-02-14 NOTE — PROGRESS NOTES
Did well with straight cath, wife here, wants nurse to remove hands from gait belt when patient trying to pull up pants. Again reviewed safety concerns, balance varies greatly. Patient becomes frustrated, cursing at times. Multiple attempts to stand.

## 2024-02-14 NOTE — PROGRESS NOTES
Attempt stand pivot transfer becomes upset that nurse needs to be close for transfer, \"stand away\" explained need to be close for transfer for safety concerns.
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
## 2024-02-14 NOTE — PROGRESS NOTES
Physical Therapy  Facility/Department: [address] [facility]  Rehabilitation Physical Therapy Treatment Note    NAME: Erci Ovalles  : 1945 (78 y.o.)  MRN: [id number]  CODE STATUS: Full Code    Date of Service: 24       Restrictions:  Restrictions/Precautions: Aspiration Risk, Fall Risk  Position Activity Restriction  Other position/activity restrictions: intermittant catheterization as PTA     SUBJECTIVE  Subjective  Subjective: Pt reports he had a good breakfast this AM. RN sent message that she needed help getting him ready this AM, she had difficulty getting the AFO on. Pt in w/c just got out of the bathroom. Thick  sock make applying brace a shoe difficult. Had to reposition pt as solid seat had shifed and was pushing on wheel.    OBJECTIVE  Cognition  Overall Cognitive Status: Exceptions  Attention Span: Appears intact  Safety Judgement: Decreased awareness of need for safety  Problem Solving: Assistance required to implement solutions;Assistance required to generate solutions  Insights: Decreased awareness of deficits  Cognition Comment: needs extra time to problem solve for transfers/mobility tasks  Orientation  Overall Orientation Status: Within Functional Limits    Functional Mobility  Transfers  Surface: Wheelchair  Additional Factors: Increased time to complete;Set-up;Verbal cues  Device: Cane (LBQC)  Sit to Stand  Assistance Level: Contact guard assist;Stand by assist  Skilled Clinical Factors: close SBA with extra time  Stand to Sit  Assistance Level: Stand by assist  Skilled Clinical Factors: Good hand placement and technique      Environmental Mobility  Ambulation  Surface: Level surface  Device: Quad Cane  Distance: 150' with LBQC, better pace and pattern, once instance of LOB tring to avoid an obstacle caused pt to need min A due to forward imbalance  Activity: Within Unit  Additional Factors: Right AFO;Set-up;Increased time to complete  Assistance Level: Contact guard  Treatment Recommendations: Strengthening;Balance training;Functional mobility training;Transfer training;Endurance training;Gait training;Stair training;Neuromuscular re-education;Home exercise program;Safety education & training;Patient/Caregiver education & training;Equipment evaluation, education, & procurement;Therapeutic activities  Safety Devices  Type of Devices: All fall risk precautions in place;Gait belt;Patient at risk for falls;Left in chair (To room with transport)  Restraints  Restraints Initially in Place: No    EDUCATION  Education  Education Given To: Patient  Education Provided: Plan of Care;Safety;Mobility Training;Transfer Training;Fall Prevention Strategies  Education Provided Comments: educated on safety and hand placement with stand pivot transfers  Education Method: Demonstration;Verbal  Barriers to Learning: Cognition  Education Outcome: Verbalized understanding;Demonstrated understanding;Continued education needed        Therapy Time   Individual Concurrent Group Co-treatment   Time In 0815         Time Out 0900         Minutes 45           Timed Code Treatment Minutes: 45 Minutes     Second Session Therapy Time     Individual Co-treatment   Time In 1400     Time Out 1445     Minutes 45          CELIA HARRISON PT, 02/14/24 at 9:10 AM     Electronically signed by CELIA HARRISON PT on 2/14/24 at 5:08 PM EST

## 2024-02-14 NOTE — PROGRESS NOTES
One assist to bathroom with stand pivot, multiple attempts to transfer back to wheelchair, needed second person back to wheelchair.

## 2024-02-15 LAB
ANION GAP SERPL CALCULATED.3IONS-SCNC: 6 MMOL/L (ref 3–16)
BASOPHILS # BLD: 0 K/UL (ref 0–0.2)
BASOPHILS NFR BLD: 0.9 %
BUN SERPL-MCNC: 16 MG/DL (ref 7–20)
CALCIUM SERPL-MCNC: 8.3 MG/DL (ref 8.3–10.6)
CHLORIDE SERPL-SCNC: 106 MMOL/L (ref 99–110)
CO2 SERPL-SCNC: 31 MMOL/L (ref 21–32)
CREAT SERPL-MCNC: 0.7 MG/DL (ref 0.8–1.3)
DEPRECATED RDW RBC AUTO: 19 % (ref 12.4–15.4)
EOSINOPHIL # BLD: 0.2 K/UL (ref 0–0.6)
EOSINOPHIL NFR BLD: 4.8 %
FOLATE SERPL-MCNC: <2 NG/ML (ref 4.78–24.2)
GFR SERPLBLD CREATININE-BSD FMLA CKD-EPI: >60 ML/MIN/{1.73_M2}
GLUCOSE SERPL-MCNC: 98 MG/DL (ref 70–99)
HCT VFR BLD AUTO: 25.3 % (ref 40.5–52.5)
HGB BLD-MCNC: 8.6 G/DL (ref 13.5–17.5)
LYMPHOCYTES # BLD: 1.5 K/UL (ref 1–5.1)
LYMPHOCYTES NFR BLD: 30.2 %
MCH RBC QN AUTO: 33.4 PG (ref 26–34)
MCHC RBC AUTO-ENTMCNC: 34.1 G/DL (ref 31–36)
MCV RBC AUTO: 98 FL (ref 80–100)
MONOCYTES # BLD: 0.4 K/UL (ref 0–1.3)
MONOCYTES NFR BLD: 7.6 %
NEUTROPHILS # BLD: 2.9 K/UL (ref 1.7–7.7)
NEUTROPHILS NFR BLD: 56.5 %
PLATELET # BLD AUTO: 286 K/UL (ref 135–450)
PMV BLD AUTO: 7.4 FL (ref 5–10.5)
POTASSIUM SERPL-SCNC: 5 MMOL/L (ref 3.5–5.1)
RBC # BLD AUTO: 2.58 M/UL (ref 4.2–5.9)
SODIUM SERPL-SCNC: 143 MMOL/L (ref 136–145)
VIT B12 SERPL-MCNC: 630 PG/ML (ref 211–911)
WBC # BLD AUTO: 5.1 K/UL (ref 4–11)

## 2024-02-15 PROCEDURE — 97110 THERAPEUTIC EXERCISES: CPT

## 2024-02-15 PROCEDURE — 1280000000 HC REHAB R&B

## 2024-02-15 PROCEDURE — 36415 COLL VENOUS BLD VENIPUNCTURE: CPT

## 2024-02-15 PROCEDURE — 97530 THERAPEUTIC ACTIVITIES: CPT

## 2024-02-15 PROCEDURE — 82746 ASSAY OF FOLIC ACID SERUM: CPT

## 2024-02-15 PROCEDURE — 51701 INSERT BLADDER CATHETER: CPT

## 2024-02-15 PROCEDURE — 85025 COMPLETE CBC W/AUTO DIFF WBC: CPT

## 2024-02-15 PROCEDURE — 6360000002 HC RX W HCPCS: Performed by: PHYSICAL MEDICINE & REHABILITATION

## 2024-02-15 PROCEDURE — 97116 GAIT TRAINING THERAPY: CPT

## 2024-02-15 PROCEDURE — 97535 SELF CARE MNGMENT TRAINING: CPT

## 2024-02-15 PROCEDURE — 6370000000 HC RX 637 (ALT 250 FOR IP): Performed by: PHYSICAL MEDICINE & REHABILITATION

## 2024-02-15 PROCEDURE — 6370000000 HC RX 637 (ALT 250 FOR IP): Performed by: STUDENT IN AN ORGANIZED HEALTH CARE EDUCATION/TRAINING PROGRAM

## 2024-02-15 PROCEDURE — 82607 VITAMIN B-12: CPT

## 2024-02-15 PROCEDURE — 80048 BASIC METABOLIC PNL TOTAL CA: CPT

## 2024-02-15 RX ORDER — FOLIC ACID 1 MG/1
1 TABLET ORAL DAILY
Status: DISCONTINUED | OUTPATIENT
Start: 2024-02-15 | End: 2024-02-23 | Stop reason: HOSPADM

## 2024-02-15 RX ADMIN — LAMOTRIGINE 200 MG: 100 TABLET ORAL at 07:54

## 2024-02-15 RX ADMIN — ACETAMINOPHEN 1000 MG: 500 TABLET ORAL at 21:29

## 2024-02-15 RX ADMIN — FOLIC ACID 1 MG: 1 TABLET ORAL at 14:07

## 2024-02-15 RX ADMIN — ATORVASTATIN CALCIUM 80 MG: 80 TABLET, FILM COATED ORAL at 21:29

## 2024-02-15 RX ADMIN — LAMOTRIGINE 200 MG: 100 TABLET ORAL at 21:28

## 2024-02-15 RX ADMIN — BACLOFEN 10 MG: 10 TABLET ORAL at 13:59

## 2024-02-15 RX ADMIN — DICLOFENAC SODIUM 4 G: 10 GEL TOPICAL at 13:59

## 2024-02-15 RX ADMIN — CARVEDILOL 3.12 MG: 3.12 TABLET, FILM COATED ORAL at 07:54

## 2024-02-15 RX ADMIN — ACETAMINOPHEN 1000 MG: 500 TABLET ORAL at 06:25

## 2024-02-15 RX ADMIN — TAMSULOSIN HYDROCHLORIDE 0.4 MG: 0.4 CAPSULE ORAL at 21:28

## 2024-02-15 RX ADMIN — DICLOFENAC SODIUM 4 G: 10 GEL TOPICAL at 08:24

## 2024-02-15 RX ADMIN — DICLOFENAC SODIUM 4 G: 10 GEL TOPICAL at 17:31

## 2024-02-15 RX ADMIN — ACETAMINOPHEN 1000 MG: 500 TABLET ORAL at 13:58

## 2024-02-15 RX ADMIN — PANTOPRAZOLE SODIUM 40 MG: 40 TABLET, DELAYED RELEASE ORAL at 15:43

## 2024-02-15 RX ADMIN — SENNOSIDES AND DOCUSATE SODIUM 1 TABLET: 8.6; 5 TABLET ORAL at 07:53

## 2024-02-15 RX ADMIN — PHENYTOIN SODIUM 100 MG: 100 CAPSULE ORAL at 21:27

## 2024-02-15 RX ADMIN — BACLOFEN 10 MG: 10 TABLET ORAL at 21:28

## 2024-02-15 RX ADMIN — Medication 400 MG: at 07:53

## 2024-02-15 RX ADMIN — BACLOFEN 10 MG: 10 TABLET ORAL at 07:53

## 2024-02-15 RX ADMIN — OXYCODONE HYDROCHLORIDE 10 MG: 10 TABLET, FILM COATED, EXTENDED RELEASE ORAL at 21:29

## 2024-02-15 RX ADMIN — CARVEDILOL 3.12 MG: 3.12 TABLET, FILM COATED ORAL at 16:46

## 2024-02-15 RX ADMIN — ENOXAPARIN SODIUM 80 MG: 100 INJECTION SUBCUTANEOUS at 07:33

## 2024-02-15 RX ADMIN — PHENYTOIN SODIUM 100 MG: 100 CAPSULE ORAL at 13:59

## 2024-02-15 RX ADMIN — POLYETHYLENE GLYCOL 3350 17 G: 17 POWDER, FOR SOLUTION ORAL at 14:07

## 2024-02-15 RX ADMIN — PHENYTOIN SODIUM 100 MG: 100 CAPSULE ORAL at 07:57

## 2024-02-15 RX ADMIN — PANTOPRAZOLE SODIUM 40 MG: 40 TABLET, DELAYED RELEASE ORAL at 06:25

## 2024-02-15 RX ADMIN — SENNOSIDES AND DOCUSATE SODIUM 1 TABLET: 8.6; 5 TABLET ORAL at 21:27

## 2024-02-15 RX ADMIN — ENOXAPARIN SODIUM 80 MG: 100 INJECTION SUBCUTANEOUS at 21:28

## 2024-02-15 RX ADMIN — OXYCODONE HYDROCHLORIDE 10 MG: 10 TABLET, FILM COATED, EXTENDED RELEASE ORAL at 07:53

## 2024-02-15 ASSESSMENT — PAIN DESCRIPTION - LOCATION
LOCATION: BACK
LOCATION: HIP
LOCATION: HIP;BACK
LOCATION: BACK;HIP
LOCATION: BACK;HIP

## 2024-02-15 ASSESSMENT — PAIN DESCRIPTION - FREQUENCY: FREQUENCY: INTERMITTENT

## 2024-02-15 ASSESSMENT — PAIN DESCRIPTION - DESCRIPTORS
DESCRIPTORS: ACHING;DULL
DESCRIPTORS: ACHING;DULL
DESCRIPTORS: DISCOMFORT
DESCRIPTORS: DISCOMFORT
DESCRIPTORS: ACHING;DULL

## 2024-02-15 ASSESSMENT — PAIN SCALES - GENERAL
PAINLEVEL_OUTOF10: 6
PAINLEVEL_OUTOF10: 5
PAINLEVEL_OUTOF10: 6
PAINLEVEL_OUTOF10: 2
PAINLEVEL_OUTOF10: 5
PAINLEVEL_OUTOF10: 6
PAINLEVEL_OUTOF10: 5

## 2024-02-15 ASSESSMENT — PAIN DESCRIPTION - PAIN TYPE
TYPE: CHRONIC PAIN

## 2024-02-15 ASSESSMENT — PAIN DESCRIPTION - ORIENTATION
ORIENTATION: RIGHT;LOWER
ORIENTATION: LOWER
ORIENTATION: RIGHT

## 2024-02-15 ASSESSMENT — PAIN DESCRIPTION - ONSET: ONSET: ON-GOING
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
## 2024-02-15 NOTE — PROGRESS NOTES
Resting quietly in bed, respirations even/easy. Patient admitted with a SAH after a fall at home. Hx of TBI with residual right sided weakness. Patient transferred last evening with a stand pivot to the wheelchair, tolerated fairly well. Lungs CTA, HR regular. Abdomen non tender with active bowel sounds. No BM this shift and requiring straight cath for urine. C/o chronic right hip pain and medicated per routine orders. Medication effective. Encouraged to call for all needs, call light remains in reach at all times. Bed alarm active. Justa Landa RN
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
## 2024-02-15 NOTE — PROGRESS NOTES
Wife here and updated. Patient admitted to rehab with SAH and falls. A/Ox4. Transfers with stand pivot with or without cane, can vary. On regular diet, tolerating fair to good. Medications taken whole. On Lovenox for DVT prophylaxis.  Skin: monitor. Has been continent of bowel. LBM today large and hard. Declined additional laxative, continue straight cath as ordered. Chair/bed alarms in use and call light in reach. Will monitor for safety. Cooperative with cues today.

## 2024-02-15 NOTE — PROGRESS NOTES
Physical Therapy  Facility/Department: [address] [facility]  Rehabilitation Physical Therapy Treatment Note    NAME: Eric Ovalles  : 1945 (78 y.o.)  MRN: [id number]  CODE STATUS: Full Code    Date of Service: 2/15/24       Restrictions:  Restrictions/Precautions: Aspiration Risk, Fall Risk  Position Activity Restriction  Other position/activity restrictions: intermittant catheterization as PTA     SUBJECTIVE  Subjective  Subjective: Pt with no c/o  Pain: no pain reported this morning     OBJECTIVE       Functional Mobility  Transfers  Surface: Wheelchair  Additional Factors: Increased time to complete;Set-up;Verbal cues  Device: Cane (LBQC)  Sit to Stand  Assistance Level: Contact guard assist;Stand by assist  Skilled Clinical Factors: CGA first time, then close SBA with extra time  Stand to Sit  Assistance Level: Stand by assist  Skilled Clinical Factors: Good hand placement and technique      Environmental Mobility  Ambulation  Surface: Level surface  Device: Quad Cane  Distance: 250' with LBQC, better pace and pattern, several minor LOB with fatigue that pt required CGA to recover, pt took 2 standing rest breaks, 60' with close SBA to CGA with one misstep but no LOB  Activity: Within Unit  Additional Factors: Right AFO;Set-up;Increased time to complete  Assistance Level: Stand by assist;Contact guard assist  Gait Deviations: Slow butch;Decreased step length bilateral;Decreased weight shift right;Narrow base of support;Unsteady gait  Skilled Clinical Factors: decreased RLE step length with increased distance and increased unsteadiness, slides R foot and variable step length with LLE    PT Exercises  Exercise Treatment: L LE AP, ankle circles, B TKE, L seated marching, B add sets and green theraband abduction, L LE hamstring curls with green theraband x 15 each  Static Standing Balance Exercises: stood at bedside table to put balls onto pegs and then take them off with L hand, R hand hangs at side, SBA x

## 2024-02-15 NOTE — PLAN OF CARE
Problem: Discharge Planning  Goal: Discharge to home or other facility with appropriate resources  2/15/2024 0916 by Sudha Collado, RN  Outcome: Progressing  Flowsheets  Taken 2/15/2024 0916  Discharge to home or other facility with appropriate resources:   Identify barriers to discharge with patient and caregiver   Identify discharge learning needs (meds, wound care, etc)  Taken 2/15/2024 0732  Discharge to home or other facility with appropriate resources:   Identify barriers to discharge with patient and caregiver   Arrange for needed discharge resources and transportation as appropriate   Identify discharge learning needs (meds, wound care, etc)   Refer to discharge planning if patient needs post-hospital services based on physician order or complex needs related to functional status, cognitive ability or social support system  Note: Continue discharge goals, wife gives medications, did give wife handout for Lovenox, will need at dc.  2/15/2024 0328 by Justa Landa RN  Outcome: Progressing     Problem: Safety - Adult  Goal: Free from fall injury  2/15/2024 0916 by Sudha Collado RN  Outcome: Progressing  Flowsheets (Taken 2/15/2024 0908)  Free From Fall Injury: Instruct family/caregiver on patient safety  Note: Co operative with transfers this am.  2/15/2024 0328 by Justa Landa RN  Outcome: Progressing  Flowsheets (Taken 2/15/2024 0328)  Free From Fall Injury:   Based on caregiver fall risk screen, instruct family/caregiver to ask for assistance with transferring infant if caregiver noted to have fall risk factors   Instruct family/caregiver on patient safety  Note: Pt educated on falls precautions and safety. Call light and personal belongings within reach at all times. Non skid socks in use when up. Hourly rounding and alarms active.      Problem: ABCDS Injury Assessment  Goal: Absence of physical injury  2/15/2024 0916 by Sudha Collado, DAVID  Outcome:  breakdown  Description: 1.  Monitor for areas of redness and/or skin breakdown  2.  Assess vascular access sites hourly  3.  Every 4-6 hours minimum:  Change oxygen saturation probe site  4.  Every 4-6 hours:  If on nasal continuous positive airway pressure, respiratory therapy assess nares and determine need for appliance change or resting period.  2/15/2024 0916 by Sudha Collado RN  Outcome: Progressing  2/15/2024 0328 by Justa Landa RN  Outcome: Progressing  Note: Able to change positions in bed without assist, no evidence of skin breakdown noted. Waffle cushion in place to chair.      Problem: Musculoskeletal - Adult  Goal: Return mobility to safest level of function  2/15/2024 0916 by Sudha Collado RN  Outcome: Progressing  Flowsheets  Taken 2/15/2024 0916  Return Mobility to Safest Level of Function:   Assess patient stability and activity tolerance for standing, transferring and ambulating with or without assistive devices   Assist with transfers and ambulation using safe patient handling equipment as needed  Taken 2/15/2024 0732  Return Mobility to Safest Level of Function:   Assess patient stability and activity tolerance for standing, transferring and ambulating with or without assistive devices   Assist with transfers and ambulation using safe patient handling equipment as needed   Ensure adequate protection for wounds/incisions during mobilization  Note: Continue to review safety needs.  2/15/2024 0328 by Justa Landa, RN  Outcome: Progressing  Goal: Maintain proper alignment of affected body part  2/15/2024 0916 by Sudha Collado RN  Outcome: Progressing  Flowsheets (Taken 2/15/2024 0732)  Maintain proper alignment of affected body part:   Support and protect limb and body alignment per provider's orders   Instruct and reinforce with patient and family use of appropriate assistive device and precautions (e.g. spinal or hip dislocation precautions)  2/15/2024 0328

## 2024-02-15 NOTE — PLAN OF CARE
Problem: Safety - Adult  Goal: Free from fall injury  Outcome: Progressing  Flowsheets (Taken 2/15/2024 0328)  Free From Fall Injury:   Based on caregiver fall risk screen, instruct family/caregiver to ask for assistance with transferring infant if caregiver noted to have fall risk factors   Instruct family/caregiver on patient safety  Note: Pt educated on falls precautions and safety. Call light and personal belongings within reach at all times. Non skid socks in use when up. Hourly rounding and alarms active.      Problem: Pain  Goal: Verbalizes/displays adequate comfort level or baseline comfort level  Outcome: Progressing  Flowsheets (Taken 2/15/2024 0328)  Verbalizes/displays adequate comfort level or baseline comfort level:   Encourage patient to monitor pain and request assistance   Administer analgesics based on type and severity of pain and evaluate response   Consider cultural and social influences on pain and pain management  Note: Able to rate pain using a 1-10 scale, medicated per prn orders, see MAR. Able to verbalize a reduction in pain and/or able to fall asleep and remain asleep without any s/s of pain      Problem: Skin/Tissue Integrity  Goal: Absence of new skin breakdown  Description: 1.  Monitor for areas of redness and/or skin breakdown  2.  Assess vascular access sites hourly  3.  Every 4-6 hours minimum:  Change oxygen saturation probe site  4.  Every 4-6 hours:  If on nasal continuous positive airway pressure, respiratory therapy assess nares and determine need for appliance change or resting period.  Outcome: Progressing  Note: Able to change positions in bed without assist, no evidence of skin breakdown noted. Waffle cushion in place to chair.

## 2024-02-15 NOTE — PROGRESS NOTES
Comprehensive Nutrition Assessment    Type and Reason for Visit:  Reassess    Nutrition Recommendations/Plan:   Continue current diet.     Malnutrition Assessment:  Malnutrition Status:  Moderate malnutrition (02/15/24 0934)    Context:  Chronic Illness       Nutrition Assessment:    Pt. continues on a regular diet. Meal intakes appear adequate. Pt. up in the chair eating lunch at the time of visit. Pt. reports no concerns or questions. Weight stable since admission. No new recommendations at this time. Will monitor nutritional adequacy and diet acceptance.    Nutrition Related Findings:    Nutrition related labs reviewed. [facility] 2/15. Wound Type: None       Current Nutrition Intake & Therapies:    Average Meal Intake: 51-75%, %  Average Supplements Intake: None Ordered  ADULT DIET; Regular    Anthropometric Measures:  Height: 185.9 cm (6' 1.2\")  Ideal Body Weight (IBW): 185 lbs (84 kg)       Current Body Weight: 81.5 kg (179 lb 10.8 oz),   IBW.    Current BMI (kg/m2): 23.6                          BMI Categories: Normal Weight (BMI 22.0 to 24.9) age over 65    Estimated Daily Nutrient Needs:  Energy Requirements Based On: Kcal/kg  Weight Used for Energy Requirements: Usual  Energy (kcal/day): 2000-2400kcal (25-30kcal/kg)  Weight Used for Protein Requirements: Usual  Protein (g/day): 80-96g (1-1.2g/kg)  Method Used for Fluid Requirements: 1 ml/kcal  Fluid (ml/day): 100-1080mL    Nutrition Diagnosis:   Unintended weight loss related to inadequate protein-energy intake as evidenced by poor intake prior to admission, weight loss greater than or equal to 5% in 1 month    Nutrition Interventions:   Food and/or Nutrient Delivery: Continue Current Diet, Continue Oral Nutrition Supplement  Nutrition Education/Counseling:  (Monitor need)  Coordination of Nutrition Care: Continue to monitor while inpatient       Goals:  Previous Goal Met: Progressing toward Goal(s)  Goals: Meet at least 75% of estimated needs

## 2024-02-15 NOTE — PROGRESS NOTES
Bathed and dressed for early therapy, very pleasant this am. 950 ml straight cath, no issues, denied any fullness,set up for meal, transfer much better this am.

## 2024-02-15 NOTE — CARE COORDINATION
Faxed Form for request for transport chair to [facility] Dept at [phone].  NICK Milner     Case Management   [phone]    2/15/2024  4:32 PM

## 2024-02-16 PROCEDURE — 1280000000 HC REHAB R&B

## 2024-02-16 PROCEDURE — 6360000002 HC RX W HCPCS: Performed by: PHYSICAL MEDICINE & REHABILITATION

## 2024-02-16 PROCEDURE — 51798 US URINE CAPACITY MEASURE: CPT

## 2024-02-16 PROCEDURE — 97530 THERAPEUTIC ACTIVITIES: CPT

## 2024-02-16 PROCEDURE — 97110 THERAPEUTIC EXERCISES: CPT

## 2024-02-16 PROCEDURE — 94760 N-INVAS EAR/PLS OXIMETRY 1: CPT

## 2024-02-16 PROCEDURE — 97535 SELF CARE MNGMENT TRAINING: CPT

## 2024-02-16 PROCEDURE — 97116 GAIT TRAINING THERAPY: CPT

## 2024-02-16 PROCEDURE — 6370000000 HC RX 637 (ALT 250 FOR IP): Performed by: STUDENT IN AN ORGANIZED HEALTH CARE EDUCATION/TRAINING PROGRAM

## 2024-02-16 PROCEDURE — 6370000000 HC RX 637 (ALT 250 FOR IP): Performed by: PHYSICAL MEDICINE & REHABILITATION

## 2024-02-16 PROCEDURE — 51701 INSERT BLADDER CATHETER: CPT

## 2024-02-16 RX ADMIN — ACETAMINOPHEN 1000 MG: 500 TABLET ORAL at 22:08

## 2024-02-16 RX ADMIN — LAMOTRIGINE 200 MG: 100 TABLET ORAL at 22:08

## 2024-02-16 RX ADMIN — LAMOTRIGINE 200 MG: 100 TABLET ORAL at 08:41

## 2024-02-16 RX ADMIN — BACLOFEN 10 MG: 10 TABLET ORAL at 14:12

## 2024-02-16 RX ADMIN — Medication 400 MG: at 08:41

## 2024-02-16 RX ADMIN — PANTOPRAZOLE SODIUM 40 MG: 40 TABLET, DELAYED RELEASE ORAL at 17:49

## 2024-02-16 RX ADMIN — BACLOFEN 10 MG: 10 TABLET ORAL at 22:07

## 2024-02-16 RX ADMIN — DICLOFENAC SODIUM 4 G: 10 GEL TOPICAL at 17:49

## 2024-02-16 RX ADMIN — FOLIC ACID 1 MG: 1 TABLET ORAL at 08:41

## 2024-02-16 RX ADMIN — OXYCODONE HYDROCHLORIDE 10 MG: 10 TABLET, FILM COATED, EXTENDED RELEASE ORAL at 08:41

## 2024-02-16 RX ADMIN — PHENYTOIN SODIUM 100 MG: 100 CAPSULE ORAL at 14:12

## 2024-02-16 RX ADMIN — BACLOFEN 10 MG: 10 TABLET ORAL at 08:41

## 2024-02-16 RX ADMIN — DICLOFENAC SODIUM 4 G: 10 GEL TOPICAL at 22:11

## 2024-02-16 RX ADMIN — SENNOSIDES AND DOCUSATE SODIUM 1 TABLET: 8.6; 5 TABLET ORAL at 08:42

## 2024-02-16 RX ADMIN — SENNOSIDES AND DOCUSATE SODIUM 1 TABLET: 8.6; 5 TABLET ORAL at 22:07

## 2024-02-16 RX ADMIN — PANTOPRAZOLE SODIUM 40 MG: 40 TABLET, DELAYED RELEASE ORAL at 06:05

## 2024-02-16 RX ADMIN — TAMSULOSIN HYDROCHLORIDE 0.4 MG: 0.4 CAPSULE ORAL at 22:07

## 2024-02-16 RX ADMIN — ATORVASTATIN CALCIUM 80 MG: 80 TABLET, FILM COATED ORAL at 22:08

## 2024-02-16 RX ADMIN — ENOXAPARIN SODIUM 80 MG: 100 INJECTION SUBCUTANEOUS at 22:08

## 2024-02-16 RX ADMIN — CARVEDILOL 3.12 MG: 3.12 TABLET, FILM COATED ORAL at 17:49

## 2024-02-16 RX ADMIN — PHENYTOIN SODIUM 100 MG: 100 CAPSULE ORAL at 08:41

## 2024-02-16 RX ADMIN — ACETAMINOPHEN 1000 MG: 500 TABLET ORAL at 06:05

## 2024-02-16 RX ADMIN — OXYCODONE HYDROCHLORIDE 10 MG: 10 TABLET, FILM COATED, EXTENDED RELEASE ORAL at 22:06

## 2024-02-16 RX ADMIN — ENOXAPARIN SODIUM 80 MG: 100 INJECTION SUBCUTANEOUS at 08:42

## 2024-02-16 RX ADMIN — ACETAMINOPHEN 1000 MG: 500 TABLET ORAL at 14:12

## 2024-02-16 RX ADMIN — PHENYTOIN SODIUM 100 MG: 100 CAPSULE ORAL at 22:07

## 2024-02-16 RX ADMIN — CARVEDILOL 3.12 MG: 3.12 TABLET, FILM COATED ORAL at 08:41

## 2024-02-16 RX ADMIN — DICLOFENAC SODIUM 4 G: 10 GEL TOPICAL at 14:11

## 2024-02-16 ASSESSMENT — PAIN SCALES - GENERAL
PAINLEVEL_OUTOF10: 6
PAINLEVEL_OUTOF10: 0
PAINLEVEL_OUTOF10: 4
PAINLEVEL_OUTOF10: 3

## 2024-02-16 ASSESSMENT — PAIN DESCRIPTION - ORIENTATION: ORIENTATION: RIGHT;LOWER

## 2024-02-16 ASSESSMENT — PAIN DESCRIPTION - LOCATION
LOCATION: HIP;BACK
LOCATION: GENERALIZED

## 2024-02-16 ASSESSMENT — PAIN DESCRIPTION - DESCRIPTORS
DESCRIPTORS: DISCOMFORT;DULL
DESCRIPTORS: ACHING;DISCOMFORT

## 2024-02-16 NOTE — PROGRESS NOTES
Physical Therapy  Facility/Department: [address] REHAB  Rehabilitation Physical Therapy Treatment Note    NAME: Eric Ovalles  : 1945 (78 y.o.)  MRN: [id number]  CODE STATUS: Full Code    Date of Service: 24       Restrictions:  Restrictions/Precautions: Aspiration Risk, Fall Risk  Position Activity Restriction  Other position/activity restrictions: intermittant catheterization as PTA     SUBJECTIVE  Subjective  Subjective: Pt with no c/o  Pain: no pain reported this morning      OBJECTIVE  Cognition  Overall Cognitive Status: Exceptions  Following Commands: Follows multistep commands with increased time;Follows multistep commands with repitition  Attention Span: Appears intact  Safety Judgement: Decreased awareness of need for safety  Problem Solving: Assistance required to implement solutions;Assistance required to generate solutions  Insights: Decreased awareness of deficits  Cognition Comment: needs extra time to problem solve for transfers/mobility tasks  Orientation  Overall Orientation Status: Within Functional Limits    Functional Mobility  Transfers  Surface: Wheelchair  Additional Factors: Increased time to complete;Set-up;Verbal cues  Device: Cane (LBQC)  Sit to Stand  Assistance Level: Contact guard assist;Stand by assist  Skilled Clinical Factors: CGA first time, then close SBA with extra time from w/c and armchair  Stand to Sit  Assistance Level: Stand by assist  Skilled Clinical Factors: Good hand placement and technique  Car Transfer  Assistance Level: Minimal assistance  Skilled Clinical Factors: min A to lift R LE into car, pt able to get both legs out of car but needed min A to stand from car.  Pt reports his car is much higher; will schedule trasnfer with his car next week.      Environmental Mobility  Ambulation  Surface: Level surface;Carpet  Device: Quad Cane  Distance: 90' through kitchen and ADL room, through 3 doorways and over 2 surface transitions, at least 4 turns,

## 2024-02-16 NOTE — PLAN OF CARE
Problem: Discharge Planning  Goal: Discharge to home or other facility with appropriate resources  Outcome: Progressing  Flowsheets (Taken 2/16/2024 0139)  Discharge to home or other facility with appropriate resources:   Identify barriers to discharge with patient and caregiver   Identify discharge learning needs (meds, wound care, etc)     Problem: Safety - Adult  Goal: Free from fall injury  Outcome: Progressing  Note: Fall risk band on patient. Orange light on outside of room. Non skid footwear in place. Alarms used appropriately. Patient instructed to call and wait for staff before getting up. Rounding done to anticipate needs. Appropriate safety devices used for transfers.       Problem: ABCDS Injury Assessment  Goal: Absence of physical injury  Outcome: Progressing  Flowsheets (Taken 2/16/2024 0139)  Absence of Physical Injury: Implement safety measures based on patient assessment     Problem: Pain  Goal: Verbalizes/displays adequate comfort level or baseline comfort level  Outcome: Progressing  Flowsheets (Taken 2/16/2024 0139)  Verbalizes/displays adequate comfort level or baseline comfort level:   Encourage patient to monitor pain and request assistance   Assess pain using appropriate pain scale   Administer analgesics based on type and severity of pain and evaluate response   Implement non-pharmacological measures as appropriate and evaluate response     Problem: Skin/Tissue Integrity  Goal: Absence of new skin breakdown  Description: 1.  Monitor for areas of redness and/or skin breakdown  2.  Assess vascular access sites hourly  3.  Every 4-6 hours minimum:  Change oxygen saturation probe site  4.  Every 4-6 hours:  If on nasal continuous positive airway pressure, respiratory therapy assess nares and determine need for appliance change or resting period.  Outcome: Progressing  Note: Skin assessment completed on admission and every shift. Barrier wipes used in the event of incontinence. Pressure relief  techniques used as needed while in chair and in bed. Position changes encouraged at least every two hours while in bed.        Problem: Musculoskeletal - Adult  Goal: Return mobility to safest level of function  Outcome: Progressing  Flowsheets (Taken 2/16/2024 0139)  Return Mobility to Safest Level of Function:   Assess patient stability and activity tolerance for standing, transferring and ambulating with or without assistive devices   Assist with transfers and ambulation using safe patient handling equipment as needed     Problem: Musculoskeletal - Adult  Goal: Maintain proper alignment of affected body part  Outcome: Progressing  Flowsheets (Taken 2/16/2024 0139)  Maintain proper alignment of affected body part:   Support and protect limb and body alignment per provider's orders   Instruct and reinforce with patient and family use of appropriate assistive device and precautions (e.g. spinal or hip dislocation precautions)     Problem: Musculoskeletal - Adult  Goal: Return ADL status to a safe level of function  Outcome: Progressing  Flowsheets (Taken 2/16/2024 0139)  Return ADL Status to a Safe Level of Function:   Administer medication as ordered   Assess activities of daily living deficits and provide assistive devices as needed   Assist and instruct patient to increase activity and self care as tolerated

## 2024-02-16 NOTE — PLAN OF CARE
Problem: Discharge Planning  Goal: Discharge to home or other facility with appropriate resources  Outcome: Progressing  Flowsheets (Taken 2/16/2024 0838)  Discharge to home or other facility with appropriate resources:   Identify barriers to discharge with patient and caregiver   Arrange for needed discharge resources and transportation as appropriate   Identify discharge learning needs (meds, wound care, etc)   Refer to discharge planning if patient needs post-hospital services based on physician order or complex needs related to functional status, cognitive ability or social support system     Problem: Safety - Adult  Goal: Free from fall injury  Outcome: Progressing  Flowsheets (Taken 2/16/2024 0839)  Free From Fall Injury: Instruct family/caregiver on patient safety     Problem: ABCDS Injury Assessment  Goal: Absence of physical injury  Outcome: Progressing  Flowsheets (Taken 2/16/2024 0839)  Absence of Physical Injury: Implement safety measures based on patient assessment     Problem: Pain  Goal: Verbalizes/displays adequate comfort level or baseline comfort level  Outcome: Progressing  Flowsheets (Taken 2/16/2024 0838)  Verbalizes/displays adequate comfort level or baseline comfort level:   Encourage patient to monitor pain and request assistance   Assess pain using appropriate pain scale   Administer analgesics based on type and severity of pain and evaluate response   Implement non-pharmacological measures as appropriate and evaluate response     Problem: Nutrition Deficit:  Goal: Optimize nutritional status  Outcome: Progressing     Problem: Skin/Tissue Integrity  Goal: Absence of new skin breakdown  Description: 1.  Monitor for areas of redness and/or skin breakdown  2.  Assess vascular access sites hourly  3.  Every 4-6 hours minimum:  Change oxygen saturation probe site  4.  Every 4-6 hours:  If on nasal continuous positive airway pressure, respiratory therapy assess nares and determine need for  appliance change or resting period.  Outcome: Progressing

## 2024-02-16 NOTE — PROGRESS NOTES
Patient admitted to rehab with subarachnoid hemorrhage.  A/Ox4. Transfers with stand pivot with cane. Mobility restrictions: right side flaccid, AFO for right foot. On regular diet, tolerating well. Medications taken whole with thins. On lovenox, triston hose for DVT prophylaxis.  Skin: buttocks red. Oxygen: RA. LDA: none. Has been continent of bowel and continent of bladder, SC q8hr. LBM 2/15/24. Chair/bed alarms in use and call light in reach. Will monitor for safety. Electronically signed by Lidia Pederson RN on 2/16/2024 at 5:14 PM
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
## 2024-02-16 NOTE — PROGRESS NOTES
Physical Therapy  Facility/Department: [address] [facility]  Rehabilitation Physical Therapy Treatment Note    NAME: Eric Ovalles  : 1945 (78 y.o.)  MRN: [id number]  CODE STATUS: Full Code    Date of Service: 24       Restrictions:  Restrictions/Precautions: Aspiration Risk, Fall Risk  Position Activity Restriction  Other position/activity restrictions: intermittant catheterization as PTA     SUBJECTIVE  Subjective  Subjective: Pt with no c/o  Pain: no pain reported this morning      OBJECTIVE  Cognition  Overall Cognitive Status: Exceptions  Following Commands: Follows multistep commands with increased time;Follows multistep commands with repitition  Attention Span: Appears intact  Safety Judgement: Decreased awareness of need for safety  Problem Solving: Assistance required to implement solutions;Assistance required to generate solutions  Insights: Decreased awareness of deficits  Cognition Comment: needs extra time to problem solve for transfers/mobility tasks  Orientation  Overall Orientation Status: Within Functional Limits    Functional Mobility  Transfers  Surface: Wheelchair  Additional Factors: Increased time to complete;Set-up;Verbal cues  Device: Cane (LBQC)  Sit to Stand  Assistance Level: Contact guard assist;Stand by assist  Skilled Clinical Factors: CGA first time, then close SBA with extra time from w/c and armchair  Stand to Sit  Assistance Level: Stand by assist  Skilled Clinical Factors: Good hand placement and technique  Car Transfer  Assistance Level: Minimal assistance  Skilled Clinical Factors: min A to lift R LE into car, pt able to get both legs out of car but needed min A to stand from car.  Pt reports his car is much higher; will schedule trasnfer with his car next week.      Environmental Mobility  Ambulation  Surface: Level surface;Carpet  Device: Quad Cane  Distance: 90' through kitchen and ADL room, through 3 doorways and over 2 surface transitions, at least 4 turns,  several narrow spaces, no LOB. Second walk 75' on low pile carpet with \"figure 8\" around several tables close BA to CGA, occasional shuffling of feet on carpet.  Activity: Within Unit  Additional Factors: Right AFO;Set-up;Increased time to complete  Assistance Level: Stand by assist;Contact guard assist (initially c/o tingling in L LE but this resolved)  Gait Deviations: Slow butch;Decreased step length bilateral;Decreased weight shift right;Narrow base of support;Unsteady gait    PT Exercises  Static Standing Balance Exercises: sit to stand x 3 SBA  Dynamic Standing Balance Exercises: pt walked to kitchen, walked around in kitchen retrieving 7 cones from varying heights from top of refridgerator and upper cabinet shelf to the garbage can and a 8\" stool all with CGA and no LOB, returned to chair for a total of 75'      ASSESSMENT/PROGRESS TOWARDS GOALS       Assessment  Assessment: Mr. Ovalles presents with weakness s/p subarachnoid hemorrhage. PLOF pt lives with his wife in a 1 story home with finished basement, there is 2 JOSE through the garage and a stair lift to the lower level. Pt was able to drive, perform transfers and amb with R AFO and LBQC pta, his wife had to help him put on his socks and shoes and get in/out of bed at times. Currently patient is below baseline function, requiring SBA for transfers and amb household distances with LBQC, CGA to SBA.  Pt practiced reaching activities abd modle car transfer.  He would benefit from continued skilled PT on our ARU to maximize independence and safety with bed mobility, transfers, amb and stairs to enter/exit home prior to D/C anticipated on Friday, Feb. 23, 2024 with HHPT.  Activity Tolerance: Patient tolerated treatment well  Discharge Recommendations: Continue to assess pending progress;Patient would benefit from continued therapy after discharge;Home with Home health PT;24 hour supervision or assist  PT Equipment Recommendations  Equipment Needed:  No  Other: Will continue to assess    Goals  Patient Goals   Patient Goals : Pt wants to be able to amb independently again  Short Term Goals  Time Frame for Short Term Goals: 1 week  Short Term Goal 1: Bed mobility with CGA  Short Term Goal 2: Transfers with CGA met 2-13  Short Term Goal 3: Amb 50' with min A LBQC met 2-12  Short Term Goal 4: Amb up/down 4 steps with Min A met 2-13  Short Term Goal 5: Amb up/down curb with min A  Long Term Goals  Time Frame for Long Term Goals : 2 weeks  Long Term Goal 1: Bed mobility with MI  Long Term Goal 2: Transfers with MI  Long Term Goal 3: Amb 100' with MI LBQC  Long Term Goal 4: Amb up/down 4 steps with CGA  Long Term Goal 5: Amb up/down curb with CGA    PLAN OF CARE/SAFETY  Physical Therapy Plan  General Plan:  minutes of therapy at least 5 out of 7 days a week  Days Per Week: 5 Days  Hours Per Day: 1.5 hours  Therapy Duration: 2 Weeks  Current Treatment Recommendations: Strengthening;Balance training;Functional mobility training;Transfer training;Endurance training;Gait training;Stair training;Neuromuscular re-education;Home exercise program;Safety education & training;Patient/Caregiver education & training;Equipment evaluation, education, & procurement;Therapeutic activities    EDUCATION           Therapy Time   Individual Concurrent Group Co-treatment   Time In 0845         Time Out 0930         Minutes 45           Timed Code Treatment Minutes: 45 Minutes       Angy Marquez, PT, 02/16/24 at 9:32 AM     PM session\" pt here with wife Juliana, discussed doing a transfer into her car on Tuesday afternoon.  O: sit to stand SBA, amb 150' with LBQC CGA to close SBA.  On carpet, sit to stand from w/c SBA, amb 75' on carpet around several tables close SBA.  Standing balloon toss with CGA, one LOB backward when reaching high quickly that required mod A, one small LOB CGA to min A.  Sit to stand SBA, amb 20' to steps with LBQC SBA, up and down 4 steps L hand on rail CGA,

## 2024-02-16 NOTE — PROGRESS NOTES
Patient admitted to rehab with subarachnoid hemorrhage.  A/Ox4. Transfers with stand/pivot to  w/cane. Mobility restrictions: right side flaccid, AFO for right foot. On regular diet, tolerating well. Medications taken whole with thins. On lovenox, triston hose for DVT prophylaxis.  Skin: redness(pink) buttocks. Oxygen: RA. Straight cath q8. Last cath @ 23:00 for 725 mL. LBM 2/15/24.   Pt resting quietly in bed at this time. Respirations easy and even. Bed alarm in use and call light in reach.
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
## 2024-02-17 PROCEDURE — 51701 INSERT BLADDER CATHETER: CPT

## 2024-02-17 PROCEDURE — 6370000000 HC RX 637 (ALT 250 FOR IP): Performed by: PHYSICAL MEDICINE & REHABILITATION

## 2024-02-17 PROCEDURE — 6360000002 HC RX W HCPCS: Performed by: PHYSICAL MEDICINE & REHABILITATION

## 2024-02-17 PROCEDURE — 6370000000 HC RX 637 (ALT 250 FOR IP): Performed by: STUDENT IN AN ORGANIZED HEALTH CARE EDUCATION/TRAINING PROGRAM

## 2024-02-17 PROCEDURE — 1280000000 HC REHAB R&B

## 2024-02-17 PROCEDURE — 94760 N-INVAS EAR/PLS OXIMETRY 1: CPT

## 2024-02-17 RX ADMIN — CARVEDILOL 3.12 MG: 3.12 TABLET, FILM COATED ORAL at 16:54

## 2024-02-17 RX ADMIN — ENOXAPARIN SODIUM 80 MG: 100 INJECTION SUBCUTANEOUS at 09:12

## 2024-02-17 RX ADMIN — FOLIC ACID 1 MG: 1 TABLET ORAL at 09:35

## 2024-02-17 RX ADMIN — BACLOFEN 10 MG: 10 TABLET ORAL at 13:59

## 2024-02-17 RX ADMIN — BACLOFEN 10 MG: 10 TABLET ORAL at 09:34

## 2024-02-17 RX ADMIN — SENNOSIDES AND DOCUSATE SODIUM 1 TABLET: 8.6; 5 TABLET ORAL at 20:07

## 2024-02-17 RX ADMIN — OXYCODONE HYDROCHLORIDE 10 MG: 10 TABLET, FILM COATED, EXTENDED RELEASE ORAL at 20:08

## 2024-02-17 RX ADMIN — LAMOTRIGINE 200 MG: 100 TABLET ORAL at 09:35

## 2024-02-17 RX ADMIN — TAMSULOSIN HYDROCHLORIDE 0.4 MG: 0.4 CAPSULE ORAL at 20:07

## 2024-02-17 RX ADMIN — DICLOFENAC SODIUM 4 G: 10 GEL TOPICAL at 21:51

## 2024-02-17 RX ADMIN — PHENYTOIN SODIUM 100 MG: 100 CAPSULE ORAL at 20:08

## 2024-02-17 RX ADMIN — SENNOSIDES AND DOCUSATE SODIUM 1 TABLET: 8.6; 5 TABLET ORAL at 09:35

## 2024-02-17 RX ADMIN — PHENYTOIN SODIUM 100 MG: 100 CAPSULE ORAL at 09:35

## 2024-02-17 RX ADMIN — CARVEDILOL 3.12 MG: 3.12 TABLET, FILM COATED ORAL at 09:35

## 2024-02-17 RX ADMIN — ACETAMINOPHEN 1000 MG: 500 TABLET ORAL at 13:58

## 2024-02-17 RX ADMIN — ACETAMINOPHEN 1000 MG: 500 TABLET ORAL at 05:36

## 2024-02-17 RX ADMIN — LAMOTRIGINE 200 MG: 100 TABLET ORAL at 20:07

## 2024-02-17 RX ADMIN — ENOXAPARIN SODIUM 80 MG: 100 INJECTION SUBCUTANEOUS at 20:07

## 2024-02-17 RX ADMIN — DICLOFENAC SODIUM 4 G: 10 GEL TOPICAL at 14:01

## 2024-02-17 RX ADMIN — ACETAMINOPHEN 1000 MG: 500 TABLET ORAL at 21:50

## 2024-02-17 RX ADMIN — OXYCODONE HYDROCHLORIDE 10 MG: 10 TABLET, FILM COATED, EXTENDED RELEASE ORAL at 09:33

## 2024-02-17 RX ADMIN — Medication 3 MG: at 20:07

## 2024-02-17 RX ADMIN — PANTOPRAZOLE SODIUM 40 MG: 40 TABLET, DELAYED RELEASE ORAL at 05:36

## 2024-02-17 RX ADMIN — PANTOPRAZOLE SODIUM 40 MG: 40 TABLET, DELAYED RELEASE ORAL at 15:48

## 2024-02-17 RX ADMIN — ATORVASTATIN CALCIUM 80 MG: 80 TABLET, FILM COATED ORAL at 20:07

## 2024-02-17 RX ADMIN — DICLOFENAC SODIUM 4 G: 10 GEL TOPICAL at 09:41

## 2024-02-17 RX ADMIN — OXYCODONE 5 MG: 5 TABLET ORAL at 14:05

## 2024-02-17 RX ADMIN — Medication 400 MG: at 09:35

## 2024-02-17 RX ADMIN — PHENYTOIN SODIUM 100 MG: 100 CAPSULE ORAL at 13:59

## 2024-02-17 RX ADMIN — DICLOFENAC SODIUM 4 G: 10 GEL TOPICAL at 18:09

## 2024-02-17 RX ADMIN — BACLOFEN 10 MG: 10 TABLET ORAL at 20:08

## 2024-02-17 ASSESSMENT — PAIN DESCRIPTION - PAIN TYPE
TYPE: CHRONIC PAIN

## 2024-02-17 ASSESSMENT — PAIN DESCRIPTION - ORIENTATION
ORIENTATION: RIGHT

## 2024-02-17 ASSESSMENT — PAIN DESCRIPTION - LOCATION
LOCATION: HIP;GROIN
LOCATION: GROIN;HIP
LOCATION: HIP;GROIN

## 2024-02-17 ASSESSMENT — PAIN DESCRIPTION - DESCRIPTORS
DESCRIPTORS: ACHING
DESCRIPTORS: ACHING;DISCOMFORT
DESCRIPTORS: ACHING;DISCOMFORT

## 2024-02-17 ASSESSMENT — PAIN DESCRIPTION - FREQUENCY
FREQUENCY: CONTINUOUS
FREQUENCY: CONTINUOUS

## 2024-02-17 ASSESSMENT — PAIN SCALES - WONG BAKER
WONGBAKER_NUMERICALRESPONSE: 2
WONGBAKER_NUMERICALRESPONSE: 0

## 2024-02-17 ASSESSMENT — PAIN DESCRIPTION - ONSET
ONSET: ON-GOING
ONSET: ON-GOING

## 2024-02-17 ASSESSMENT — PAIN SCALES - GENERAL
PAINLEVEL_OUTOF10: 2
PAINLEVEL_OUTOF10: 0
PAINLEVEL_OUTOF10: 5
PAINLEVEL_OUTOF10: 5
PAINLEVEL_OUTOF10: 0
PAINLEVEL_OUTOF10: 7
PAINLEVEL_OUTOF10: 3
PAINLEVEL_OUTOF10: 5
PAINLEVEL_OUTOF10: 0

## 2024-02-17 NOTE — PROGRESS NOTES
Patient admitted to rehab with subarachnoid hemorrhage.  A/Ox4. Transfers with stand pivot with cane. Mobility restrictions: right side flaccid, AFO for right foot. On regular diet, tolerating well. Medications taken whole with thins. On lovenox, triston hose for DVT prophylaxis.  Skin: buttocks red. Oxygen: RA. LDA: none. Has been continent of bowel and continent of bladder, SC q8hr. Last straight cath performed at approx 00:30 for 900 mL. LBM 2/15/24.  No concerns voiced this shift. Pt resting quietly in bed at this time. Respirations easy and even. Bed alarm on and call light in reach.

## 2024-02-17 NOTE — PROGRESS NOTES
Awaken for straight cath for 1000 ml declined feeling full, dependent for teds, brace and lower dressing, up to chair.

## 2024-02-17 NOTE — PLAN OF CARE
Problem: Discharge Planning  Goal: Discharge to home or other facility with appropriate resources  2/17/2024 0101 by Madina Mayorga, RN  Outcome: Progressing  Flowsheets (Taken 2/17/2024 0101)  Discharge to home or other facility with appropriate resources:   Identify barriers to discharge with patient and caregiver   Identify discharge learning needs (meds, wound care, etc)     Problem: Safety - Adult  Goal: Free from fall injury  2/17/2024 0101 by Madina Mayorga, RN  Outcome: Progressing  Note: Fall risk band on patient. Orange light on outside of room. Non skid footwear in place. Alarms used appropriately. Patient instructed to call and wait for staff before getting up. Rounding done to anticipate needs. Appropriate safety devices used for transfers.       Problem: ABCDS Injury Assessment  Goal: Absence of physical injury  2/17/2024 0101 by Madina Mayorga, RN  Outcome: Progressing  Flowsheets (Taken 2/17/2024 0101)  Absence of Physical Injury: Implement safety measures based on patient assessment     Problem: Pain  Goal: Verbalizes/displays adequate comfort level or baseline comfort level  2/17/2024 0101 by Madina Mayorga, RN  Outcome: Progressing  Flowsheets (Taken 2/17/2024 0101)  Verbalizes/displays adequate comfort level or baseline comfort level:   Encourage patient to monitor pain and request assistance   Assess pain using appropriate pain scale   Administer analgesics based on type and severity of pain and evaluate response   Implement non-pharmacological measures as appropriate and evaluate response     Problem: Skin/Tissue Integrity  Goal: Absence of new skin breakdown  Description: 1.  Monitor for areas of redness and/or skin breakdown  2.  Assess vascular access sites hourly  3.  Every 4-6 hours minimum:  Change oxygen saturation probe site  4.  Every 4-6 hours:  If on nasal continuous positive airway pressure, respiratory therapy assess nares and determine need for appliance change or resting  period.  2/17/2024 0101 by Madina Mayorga, RN  Outcome: Progressing  Note: Skin assessment completed on admission and every shift. Barrier wipes used in the event of incontinence. Pressure relief techniques used as needed while in chair and in bed. Position changes encouraged at least every two hours while in bed.        Problem: Musculoskeletal - Adult  Goal: Return mobility to safest level of function  Outcome: Progressing  Flowsheets (Taken 2/17/2024 0101)  Return Mobility to Safest Level of Function:   Assess patient stability and activity tolerance for standing, transferring and ambulating with or without assistive devices   Assist with transfers and ambulation using safe patient handling equipment as needed     Problem: Musculoskeletal - Adult  Goal: Maintain proper alignment of affected body part  Outcome: Progressing     Problem: Musculoskeletal - Adult  Goal: Return ADL status to a safe level of function  Outcome: Progressing  Flowsheets (Taken 2/17/2024 0101)  Return ADL Status to a Safe Level of Function:   Administer medication as ordered   Assess activities of daily living deficits and provide assistive devices as needed

## 2024-02-17 NOTE — PROGRESS NOTES
Wife here and updated, aware of straight cath amount this am, nurse did apply glove to right hand. Patient admitted to rehab with SAH, falls.  A/Ox4. Transfers with stand pivot with of without cane. Mobility restrictions: cues for right arm, hand to keep up on pillow. On regular diet, tolerating well. Medications taken whole. On Lovenox for DVT prophylaxis.  Skin: scattered abrasions, bruising. Chair/bed alarms in use and call light in reach. Will monitor for safety. No bm thus far today, continue straight caths. Wife states gives B12 shots at home, did receive handouts on Lovenox and did explain.

## 2024-02-17 NOTE — PLAN OF CARE
Problem: Discharge Planning  Goal: Discharge to home or other facility with appropriate resources  2/17/2024 1057 by Sudha Collado RN  Outcome: Progressing  Flowsheets  Taken 2/17/2024 1057  Discharge to home or other facility with appropriate resources:   Identify barriers to discharge with patient and caregiver   Identify discharge learning needs (meds, wound care, etc)  Taken 2/17/2024 0932  Discharge to home or other facility with appropriate resources:   Identify barriers to discharge with patient and caregiver   Arrange for needed discharge resources and transportation as appropriate   Identify discharge learning needs (meds, wound care, etc)  Note: Wife is caregiver at home.  2/17/2024 0101 by Madina Mayorga, RN  Outcome: Progressing  Flowsheets (Taken 2/17/2024 0101)  Discharge to home or other facility with appropriate resources:   Identify barriers to discharge with patient and caregiver   Identify discharge learning needs (meds, wound care, etc)     Problem: Safety - Adult  Goal: Free from fall injury  2/17/2024 1057 by Sudha Collado RN  Outcome: Progressing  Flowsheets  Taken 2/17/2024 1057  Free From Fall Injury: Instruct family/caregiver on patient safety  Taken 2/17/2024 1025  Free From Fall Injury: Instruct family/caregiver on patient safety  Note: Cues for transfer, must have brace on right leg.  2/17/2024 0101 by Madina Mayorga, RN  Outcome: Progressing  Note: Fall risk band on patient. Orange light on outside of room. Non skid footwear in place. Alarms used appropriately. Patient instructed to call and wait for staff before getting up. Rounding done to anticipate needs. Appropriate safety devices used for transfers.       Problem: ABCDS Injury Assessment  Goal: Absence of physical injury  2/17/2024 1057 by Sudha Collado RN  Outcome: Progressing  Flowsheets (Taken 2/17/2024 1025)  Absence of Physical Injury: Implement safety measures based on patient  protect limb and body alignment per provider's orders  Taken 2/17/2024 0932  Maintain proper alignment of affected body part: Support and protect limb and body alignment per provider's orders  Note: Cues for right arm.  2/17/2024 0101 by Madina Mayorga, RN  Outcome: Progressing  Goal: Return ADL status to a safe level of function  2/17/2024 1057 by Sudha Collado RN  Outcome: Progressing  2/17/2024 0101 by Madina Mayorga RN  Outcome: Progressing  Flowsheets (Taken 2/17/2024 0101)  Return ADL Status to a Safe Level of Function:   Administer medication as ordered   Assess activities of daily living deficits and provide assistive devices as needed     Problem: Genitourinary - Adult  Goal: Absence of urinary retention  Outcome: Progressing  Flowsheets  Taken 2/17/2024 1057  Absence of urinary retention:   Assess patient’s ability to void and empty bladder   Monitor intake/output and perform bladder scan as needed   Place urinary catheter per Licensed Independent Practitioner order if needed  Taken 2/17/2024 0932  Absence of urinary retention:   Assess patient’s ability to void and empty bladder   Monitor intake/output and perform bladder scan as needed  Note: Wife does straight cath at home, continue to monitor, did have 1000 ml, wife reports does twice day, does not measure.

## 2024-02-18 VITALS
HEART RATE: 89 BPM | TEMPERATURE: 97.8 F | SYSTOLIC BLOOD PRESSURE: 138 MMHG | DIASTOLIC BLOOD PRESSURE: 68 MMHG | WEIGHT: 179.23 LBS | HEIGHT: 73 IN | RESPIRATION RATE: 16 BRPM | OXYGEN SATURATION: 96 % | BODY MASS INDEX: 23.75 KG/M2

## 2024-02-18 PROCEDURE — 51701 INSERT BLADDER CATHETER: CPT

## 2024-02-18 PROCEDURE — 6360000002 HC RX W HCPCS: Performed by: PHYSICAL MEDICINE & REHABILITATION

## 2024-02-18 PROCEDURE — 1280000000 HC REHAB R&B

## 2024-02-18 PROCEDURE — 94760 N-INVAS EAR/PLS OXIMETRY 1: CPT

## 2024-02-18 PROCEDURE — 6370000000 HC RX 637 (ALT 250 FOR IP): Performed by: STUDENT IN AN ORGANIZED HEALTH CARE EDUCATION/TRAINING PROGRAM

## 2024-02-18 PROCEDURE — 6370000000 HC RX 637 (ALT 250 FOR IP): Performed by: PHYSICAL MEDICINE & REHABILITATION

## 2024-02-18 RX ADMIN — DICLOFENAC SODIUM 4 G: 10 GEL TOPICAL at 08:46

## 2024-02-18 RX ADMIN — ACETAMINOPHEN 1000 MG: 500 TABLET ORAL at 05:44

## 2024-02-18 RX ADMIN — ACETAMINOPHEN 1000 MG: 500 TABLET ORAL at 13:57

## 2024-02-18 RX ADMIN — ENOXAPARIN SODIUM 80 MG: 100 INJECTION SUBCUTANEOUS at 20:45

## 2024-02-18 RX ADMIN — OXYCODONE HYDROCHLORIDE 10 MG: 10 TABLET, FILM COATED, EXTENDED RELEASE ORAL at 09:04

## 2024-02-18 RX ADMIN — PANTOPRAZOLE SODIUM 40 MG: 40 TABLET, DELAYED RELEASE ORAL at 15:31

## 2024-02-18 RX ADMIN — ACETAMINOPHEN 1000 MG: 500 TABLET ORAL at 22:37

## 2024-02-18 RX ADMIN — PHENYTOIN SODIUM 100 MG: 100 CAPSULE ORAL at 09:05

## 2024-02-18 RX ADMIN — DICLOFENAC SODIUM 4 G: 10 GEL TOPICAL at 14:01

## 2024-02-18 RX ADMIN — BACLOFEN 10 MG: 10 TABLET ORAL at 13:56

## 2024-02-18 RX ADMIN — DICLOFENAC SODIUM 4 G: 10 GEL TOPICAL at 20:47

## 2024-02-18 RX ADMIN — PANTOPRAZOLE SODIUM 40 MG: 40 TABLET, DELAYED RELEASE ORAL at 05:44

## 2024-02-18 RX ADMIN — TAMSULOSIN HYDROCHLORIDE 0.4 MG: 0.4 CAPSULE ORAL at 20:46

## 2024-02-18 RX ADMIN — LAMOTRIGINE 200 MG: 100 TABLET ORAL at 09:05

## 2024-02-18 RX ADMIN — BACLOFEN 10 MG: 10 TABLET ORAL at 20:45

## 2024-02-18 RX ADMIN — SENNOSIDES AND DOCUSATE SODIUM 1 TABLET: 8.6; 5 TABLET ORAL at 20:47

## 2024-02-18 RX ADMIN — PHENYTOIN SODIUM 100 MG: 100 CAPSULE ORAL at 13:56

## 2024-02-18 RX ADMIN — ATORVASTATIN CALCIUM 80 MG: 80 TABLET, FILM COATED ORAL at 20:45

## 2024-02-18 RX ADMIN — BACLOFEN 10 MG: 10 TABLET ORAL at 09:05

## 2024-02-18 RX ADMIN — FOLIC ACID 1 MG: 1 TABLET ORAL at 09:06

## 2024-02-18 RX ADMIN — OXYCODONE HYDROCHLORIDE 10 MG: 10 TABLET, FILM COATED, EXTENDED RELEASE ORAL at 20:45

## 2024-02-18 RX ADMIN — CARVEDILOL 3.12 MG: 3.12 TABLET, FILM COATED ORAL at 09:05

## 2024-02-18 RX ADMIN — CARVEDILOL 3.12 MG: 3.12 TABLET, FILM COATED ORAL at 16:44

## 2024-02-18 RX ADMIN — ENOXAPARIN SODIUM 80 MG: 100 INJECTION SUBCUTANEOUS at 08:37

## 2024-02-18 RX ADMIN — DICLOFENAC SODIUM 4 G: 10 GEL TOPICAL at 17:58

## 2024-02-18 RX ADMIN — Medication 3 MG: at 20:45

## 2024-02-18 RX ADMIN — LAMOTRIGINE 200 MG: 100 TABLET ORAL at 20:46

## 2024-02-18 RX ADMIN — POLYETHYLENE GLYCOL 3350 17 G: 17 POWDER, FOR SOLUTION ORAL at 09:15

## 2024-02-18 RX ADMIN — Medication 400 MG: at 09:06

## 2024-02-18 RX ADMIN — PHENYTOIN SODIUM 100 MG: 100 CAPSULE ORAL at 20:45

## 2024-02-18 RX ADMIN — SENNOSIDES AND DOCUSATE SODIUM 1 TABLET: 8.6; 5 TABLET ORAL at 09:05

## 2024-02-18 ASSESSMENT — PAIN DESCRIPTION - ORIENTATION
ORIENTATION: RIGHT

## 2024-02-18 ASSESSMENT — PAIN DESCRIPTION - DESCRIPTORS
DESCRIPTORS: ACHING;DISCOMFORT;SHARP
DESCRIPTORS: ACHING;DISCOMFORT
DESCRIPTORS: ACHING;DISCOMFORT

## 2024-02-18 ASSESSMENT — PAIN SCALES - GENERAL
PAINLEVEL_OUTOF10: 7
PAINLEVEL_OUTOF10: 0
PAINLEVEL_OUTOF10: 3
PAINLEVEL_OUTOF10: 3

## 2024-02-18 ASSESSMENT — PAIN DESCRIPTION - FREQUENCY
FREQUENCY: CONTINUOUS

## 2024-02-18 ASSESSMENT — PAIN DESCRIPTION - ONSET
ONSET: ON-GOING

## 2024-02-18 ASSESSMENT — PAIN - FUNCTIONAL ASSESSMENT
PAIN_FUNCTIONAL_ASSESSMENT: ACTIVITIES ARE NOT PREVENTED

## 2024-02-18 ASSESSMENT — PAIN SCALES - WONG BAKER
WONGBAKER_NUMERICALRESPONSE: 4
WONGBAKER_NUMERICALRESPONSE: 0

## 2024-02-18 ASSESSMENT — PAIN DESCRIPTION - PAIN TYPE
TYPE: CHRONIC PAIN

## 2024-02-18 NOTE — PROGRESS NOTES
Patient admitted to rehab with SAH, falls.  A/Ox4. Transfers with stand pivot with or without cane. Mobility restrictions: cues for right arm neglect. On regular diet, tolerating well. Medications taken whole. On Lovenox for DVT prophylaxis, wife aware will need for discharge, did review, also verbalizes understanding of handout. She reports she gives vitamin B shot at home  Skin: monitor. Last bm 02/15/2024, took prn Miralax, reviewed education to prevent constipation. Continue with straight caths, wife does at home. Chair/bed alarms in use and call light in reach. Will monitor for safety.

## 2024-02-18 NOTE — PROGRESS NOTES
Straight cath done wife here, verbalize how she does at home, did remind her may need straight cath more than twice a day. Did take several times to stand.

## 2024-02-18 NOTE — PROGRESS NOTES
Patient admitted to rehab with subarachnoid hemorrhage.  A/Ox4. Transfers with stand pivot with cane. Mobility restrictions: right side flaccid, AFO for right foot. On regular diet, tolerating well. Medications taken whole with thins. On lovenox, triston hose for DVT prophylaxis.  Skin: buttocks red. Oxygen: RA. LDA: none. Has been continent of bowel and continent of bladder, St. Cath qh. Last straight cath performed at approx 00:45 for 725 mL. Pt. Is resting and sleeping well in the bed.

## 2024-02-18 NOTE — PLAN OF CARE
Problem: Discharge Planning  Goal: Discharge to home or other facility with appropriate resources  2/17/2024 2301 by Cori Anthony RN  Outcome: Progressing  Flowsheets (Taken 2/17/2024 2000)  Discharge to home or other facility with appropriate resources: Identify barriers to discharge with patient and caregiver     Problem: Safety - Adult  Goal: Free from fall injury  2/17/2024 2301 by Cori Anthony, RN  Outcome: Progressing  Note:   Fall risk wrist band on the patient. Non-skid footwear in place. Chair alarms used appropriately.Patient calls appropriately for needs. . Appropriate safety devices used for transfer. Will continue to monitor.      Problem: Musculoskeletal - Adult  Goal: Return mobility to safest level of function  2/17/2024 2301 by Cori Anthony, RN  Outcome: Progressing  Flowsheets (Taken 2/17/2024 2000)  Return Mobility to Safest Level of Function: Assess patient stability and activity tolerance for standing, transferring and ambulating with or without assistive devices

## 2024-02-18 NOTE — PROGRESS NOTES
Department of Physical Medicine & Rehabilitation  Progress Note    Patient Identification:  Eric Ovalles  [id number]  : 1945  Admit date: 2024    Chief Complaint: Subarachnoid hemorrhage (HCC)    Subjective:   No acute events overnight.   Patient seen this am sitting up in room. Reports no new concerns. Pain level overall at his baseline.   Labs reviewed.     ROS: No f/c, n/v, cp     Objective:  Patient Vitals for the past 24 hrs:   BP Temp Temp src Pulse Resp SpO2 Weight   24 0904 -- -- -- -- 17 -- --   24 0829 123/63 97.9 °F (36.6 °C) Oral 84 19 96 % --   24 0507 -- -- -- -- -- -- 81.3 kg (179 lb 3.7 oz)   248 -- -- -- -- 16 -- --   24 132/65 97.6 °F (36.4 °C) Oral 84 16 96 % --   24 1653 126/64 -- -- 83 -- -- --     Const: Alert. No distress, pleasant.   HEENT: Normocephalic, atraumatic. Normal sclera/conjunctiva. MMM.   CV: Regular rate and rhythm.   Resp: No respiratory distress. Lungs distant/diminished at bases   Abd: Soft, nontender, nondistended, NABS+   Ext: +RUE/RLE edema  MSK: right hip ROM limited   Neuro: Alert, oriented, appropriately interactive. Mildly impaired recall.   Psych: Cooperative, appropriate mood and affect    Laboratory data: Available via EMR.   Last 24 hour lab  No results found for this or any previous visit (from the past 24 hour(s)).                Therapy progress:  Physical therapy:  Bed Mobility:  Overall Assistance Level: Contact Guard Assist  Sit>supine:  Assistance Level: Minimal assistance  Skilled Clinical Factors: with R LE only  Supine>sit:  Assistance Level: Stand by assist  Skilled Clinical Factors: with effort  Transfers:  Surface: Wheelchair  Additional Factors: Increased time to complete, Set-up, Verbal cues  Device: Cane (LBQC)  Sit>stand:  Assistance Level: Contact guard assist, Stand by assist  Skilled Clinical Factors: CGA first time, then close SBA with extra time from w/c and

## 2024-02-18 NOTE — PLAN OF CARE
Problem: Discharge Planning  Goal: Discharge to home or other facility with appropriate resources  2/18/2024 1236 by Sudha Collado RN  Outcome: Progressing  Flowsheets  Taken 2/18/2024 1236  Discharge to home or other facility with appropriate resources:   Identify barriers to discharge with patient and caregiver   Identify discharge learning needs (meds, wound care, etc)  Taken 2/18/2024 0853  Discharge to home or other facility with appropriate resources:   Identify barriers to discharge with patient and caregiver   Arrange for needed discharge resources and transportation as appropriate   Identify discharge learning needs (meds, wound care, etc)  Note: Wife caregiver at home.  2/17/2024 2301 by Cori Anthony RN  Outcome: Progressing  Flowsheets (Taken 2/17/2024 2000)  Discharge to home or other facility with appropriate resources: Identify barriers to discharge with patient and caregiver     Problem: Safety - Adult  Goal: Free from fall injury  2/18/2024 1236 by Sudha Collado RN  Outcome: Progressing  Flowsheets  Taken 2/18/2024 1206 by Sudha Collado RN  Free From Fall Injury: Instruct family/caregiver on patient safety  Taken 2/17/2024 2304 by Cori Anthony RN  Free From Fall Injury: Instruct family/caregiver on patient safety  2/17/2024 2301 by Cori Anthony RN  Outcome: Progressing  Note:   Fall risk wrist band on the patient. Non-skid footwear in place. Chair alarms used appropriately.Patient calls appropriately for needs. . Appropriate safety devices used for transfer. Will continue to monitor.      Problem: ABCDS Injury Assessment  Goal: Absence of physical injury  2/18/2024 1236 by Sudha Collado RN  Outcome: Progressing  Flowsheets  Taken 2/18/2024 1206 by Sudha Collado RN  Absence of Physical Injury: Implement safety measures based on patient assessment  Taken 2/17/2024 2304 by Cori Anthony RN  Absence of Physical Injury: Implement

## 2024-02-18 NOTE — PROGRESS NOTES
Called patient. \"Due to local telephone trouble in the area, your call cannot be completed at this time\".   Will try again later  Re: lab results  Pap still pending  Chlamydia/GC normal   Last bm 02/15/2024 did agree for Miralax for bowels.

## 2024-02-19 LAB
ANION GAP SERPL CALCULATED.3IONS-SCNC: 6 MMOL/L (ref 3–16)
BASOPHILS # BLD: 0.1 K/UL (ref 0–0.2)
BASOPHILS NFR BLD: 1.9 %
BUN SERPL-MCNC: 17 MG/DL (ref 7–20)
CALCIUM SERPL-MCNC: 8.2 MG/DL (ref 8.3–10.6)
CHLORIDE SERPL-SCNC: 107 MMOL/L (ref 99–110)
CO2 SERPL-SCNC: 30 MMOL/L (ref 21–32)
CREAT SERPL-MCNC: 0.7 MG/DL (ref 0.8–1.3)
DEPRECATED RDW RBC AUTO: 19.1 % (ref 12.4–15.4)
EOSINOPHIL # BLD: 0.2 K/UL (ref 0–0.6)
EOSINOPHIL NFR BLD: 4.9 %
GFR SERPLBLD CREATININE-BSD FMLA CKD-EPI: >60 ML/MIN/{1.73_M2}
GLUCOSE SERPL-MCNC: 107 MG/DL (ref 70–99)
HCT VFR BLD AUTO: 26.2 % (ref 40.5–52.5)
HGB BLD-MCNC: 8.6 G/DL (ref 13.5–17.5)
LYMPHOCYTES # BLD: 1.4 K/UL (ref 1–5.1)
LYMPHOCYTES NFR BLD: 31.2 %
MCH RBC QN AUTO: 32.6 PG (ref 26–34)
MCHC RBC AUTO-ENTMCNC: 32.8 G/DL (ref 31–36)
MCV RBC AUTO: 99.4 FL (ref 80–100)
MONOCYTES # BLD: 0.3 K/UL (ref 0–1.3)
MONOCYTES NFR BLD: 7.9 %
NEUTROPHILS # BLD: 2.4 K/UL (ref 1.7–7.7)
NEUTROPHILS NFR BLD: 54.1 %
PLATELET # BLD AUTO: 248 K/UL (ref 135–450)
PMV BLD AUTO: 7.6 FL (ref 5–10.5)
POTASSIUM SERPL-SCNC: 4.6 MMOL/L (ref 3.5–5.1)
RBC # BLD AUTO: 2.63 M/UL (ref 4.2–5.9)
SODIUM SERPL-SCNC: 143 MMOL/L (ref 136–145)
WBC # BLD AUTO: 4.4 K/UL (ref 4–11)

## 2024-02-19 PROCEDURE — 1280000000 HC REHAB R&B

## 2024-02-19 PROCEDURE — 6370000000 HC RX 637 (ALT 250 FOR IP): Performed by: PHYSICAL MEDICINE & REHABILITATION

## 2024-02-19 PROCEDURE — 94760 N-INVAS EAR/PLS OXIMETRY 1: CPT

## 2024-02-19 PROCEDURE — 51798 US URINE CAPACITY MEASURE: CPT

## 2024-02-19 PROCEDURE — 6360000002 HC RX W HCPCS: Performed by: PHYSICAL MEDICINE & REHABILITATION

## 2024-02-19 PROCEDURE — 80048 BASIC METABOLIC PNL TOTAL CA: CPT

## 2024-02-19 PROCEDURE — 51701 INSERT BLADDER CATHETER: CPT

## 2024-02-19 PROCEDURE — 97530 THERAPEUTIC ACTIVITIES: CPT

## 2024-02-19 PROCEDURE — 97110 THERAPEUTIC EXERCISES: CPT

## 2024-02-19 PROCEDURE — 6370000000 HC RX 637 (ALT 250 FOR IP): Performed by: STUDENT IN AN ORGANIZED HEALTH CARE EDUCATION/TRAINING PROGRAM

## 2024-02-19 PROCEDURE — 97535 SELF CARE MNGMENT TRAINING: CPT

## 2024-02-19 PROCEDURE — 97116 GAIT TRAINING THERAPY: CPT

## 2024-02-19 PROCEDURE — 36415 COLL VENOUS BLD VENIPUNCTURE: CPT

## 2024-02-19 PROCEDURE — 85025 COMPLETE CBC W/AUTO DIFF WBC: CPT

## 2024-02-19 RX ADMIN — PANTOPRAZOLE SODIUM 40 MG: 40 TABLET, DELAYED RELEASE ORAL at 05:15

## 2024-02-19 RX ADMIN — BACLOFEN 10 MG: 10 TABLET ORAL at 14:02

## 2024-02-19 RX ADMIN — ATORVASTATIN CALCIUM 80 MG: 80 TABLET, FILM COATED ORAL at 19:51

## 2024-02-19 RX ADMIN — ENOXAPARIN SODIUM 80 MG: 100 INJECTION SUBCUTANEOUS at 07:40

## 2024-02-19 RX ADMIN — TAMSULOSIN HYDROCHLORIDE 0.4 MG: 0.4 CAPSULE ORAL at 19:51

## 2024-02-19 RX ADMIN — CARVEDILOL 3.12 MG: 3.12 TABLET, FILM COATED ORAL at 07:41

## 2024-02-19 RX ADMIN — Medication 3 MG: at 21:53

## 2024-02-19 RX ADMIN — SENNOSIDES AND DOCUSATE SODIUM 1 TABLET: 8.6; 5 TABLET ORAL at 19:51

## 2024-02-19 RX ADMIN — PHENYTOIN SODIUM 100 MG: 100 CAPSULE ORAL at 20:31

## 2024-02-19 RX ADMIN — ACETAMINOPHEN 1000 MG: 500 TABLET ORAL at 21:53

## 2024-02-19 RX ADMIN — ACETAMINOPHEN 1000 MG: 500 TABLET ORAL at 14:01

## 2024-02-19 RX ADMIN — DICLOFENAC SODIUM 4 G: 10 GEL TOPICAL at 07:44

## 2024-02-19 RX ADMIN — DICLOFENAC SODIUM 4 G: 10 GEL TOPICAL at 19:53

## 2024-02-19 RX ADMIN — Medication 400 MG: at 07:41

## 2024-02-19 RX ADMIN — PANTOPRAZOLE SODIUM 40 MG: 40 TABLET, DELAYED RELEASE ORAL at 16:06

## 2024-02-19 RX ADMIN — BACLOFEN 10 MG: 10 TABLET ORAL at 19:51

## 2024-02-19 RX ADMIN — DICLOFENAC SODIUM 4 G: 10 GEL TOPICAL at 14:02

## 2024-02-19 RX ADMIN — CARVEDILOL 3.12 MG: 3.12 TABLET, FILM COATED ORAL at 16:06

## 2024-02-19 RX ADMIN — FOLIC ACID 1 MG: 1 TABLET ORAL at 07:41

## 2024-02-19 RX ADMIN — LAMOTRIGINE 200 MG: 100 TABLET ORAL at 20:31

## 2024-02-19 RX ADMIN — ENOXAPARIN SODIUM 80 MG: 100 INJECTION SUBCUTANEOUS at 20:31

## 2024-02-19 RX ADMIN — PHENYTOIN SODIUM 100 MG: 100 CAPSULE ORAL at 07:41

## 2024-02-19 RX ADMIN — BACLOFEN 10 MG: 10 TABLET ORAL at 07:41

## 2024-02-19 RX ADMIN — OXYCODONE HYDROCHLORIDE 10 MG: 10 TABLET, FILM COATED, EXTENDED RELEASE ORAL at 07:40

## 2024-02-19 RX ADMIN — LAMOTRIGINE 200 MG: 100 TABLET ORAL at 07:40

## 2024-02-19 RX ADMIN — ACETAMINOPHEN 1000 MG: 500 TABLET ORAL at 05:15

## 2024-02-19 RX ADMIN — PHENYTOIN SODIUM 100 MG: 100 CAPSULE ORAL at 14:02

## 2024-02-19 RX ADMIN — SENNOSIDES AND DOCUSATE SODIUM 1 TABLET: 8.6; 5 TABLET ORAL at 07:40

## 2024-02-19 RX ADMIN — OXYCODONE HYDROCHLORIDE 10 MG: 10 TABLET, FILM COATED, EXTENDED RELEASE ORAL at 19:53

## 2024-02-19 ASSESSMENT — PAIN SCALES - GENERAL
PAINLEVEL_OUTOF10: 5
PAINLEVEL_OUTOF10: 0
PAINLEVEL_OUTOF10: 5
PAINLEVEL_OUTOF10: 3
PAINLEVEL_OUTOF10: 0
PAINLEVEL_OUTOF10: 3

## 2024-02-19 ASSESSMENT — PAIN SCALES - WONG BAKER
WONGBAKER_NUMERICALRESPONSE: 0
WONGBAKER_NUMERICALRESPONSE: 4
WONGBAKER_NUMERICALRESPONSE: 0
WONGBAKER_NUMERICALRESPONSE: 2
WONGBAKER_NUMERICALRESPONSE: 0

## 2024-02-19 ASSESSMENT — PAIN DESCRIPTION - ORIENTATION
ORIENTATION: RIGHT

## 2024-02-19 ASSESSMENT — PAIN DESCRIPTION - DESCRIPTORS
DESCRIPTORS: ACHING;DISCOMFORT

## 2024-02-19 ASSESSMENT — PAIN DESCRIPTION - LOCATION
LOCATION: HIP
LOCATION: GROIN;HIP;LEG
LOCATION: GROIN;LEG;HIP

## 2024-02-19 ASSESSMENT — PAIN DESCRIPTION - PAIN TYPE: TYPE: CHRONIC PAIN

## 2024-02-19 ASSESSMENT — PAIN DESCRIPTION - FREQUENCY: FREQUENCY: CONTINUOUS

## 2024-02-19 ASSESSMENT — PAIN DESCRIPTION - ONSET: ONSET: ON-GOING

## 2024-02-19 NOTE — PROGRESS NOTES
Patient admitted to rehab with subarachnoid hemorrhage.  A/Ox4. Transfers with stand pivot with cane. Mobility restrictions: right side flaccid, AFO for right foot. On regular diet, tolerating well. Medications taken whole with thins. On lovenox, triston hose for DVT prophylaxis.  Skin: buttocks red. Oxygen: RA. LDA: none. Has been continent of bowel and continent of bladder, St. Cath qh. Last straight cath performed at 23.30 . Care on going.

## 2024-02-19 NOTE — PROGRESS NOTES
Physical Therapy  Facility/Department: [address] REHAB  Rehabilitation Physical Therapy Treatment Note    NAME: Eric Ovalles  : 1945 (78 y.o.)  MRN: [id number]  CODE STATUS: Full Code    Date of Service: 24       Restrictions:  Restrictions/Precautions: Aspiration Risk, Fall Risk  Position Activity Restriction  Other position/activity restrictions: intermittant catheterization as PTA     SUBJECTIVE  Subjective  Subjective: Pt with no c/o  Pain: no pain reported this morning     OBJECTIVE  Cognition  Overall Cognitive Status: Exceptions  Attention Span: Appears intact  Safety Judgement: Decreased awareness of need for safety  Problem Solving: Assistance required to implement solutions;Assistance required to generate solutions  Insights: Decreased awareness of deficits  Orientation  Overall Orientation Status: Within Functional Limits    Functional Mobility  Transfers  Surface: Wheelchair  Additional Factors: Increased time to complete;Set-up  Device: Cane (LBQC)  Sit to Stand  Assistance Level: Stand by assist  Skilled Clinical Factors: sometimes takes wo tries  Stand to Sit  Assistance Level: Stand by assist  Skilled Clinical Factors: Good hand placement and technique  Car Transfer  Assistance Level: Minimal assistance  Skilled Clinical Factors: SBA to get into car, SBA to get feet out of car, min A to stand from car with pt pulling up on door and pT stabilizing door, pt tends to pull himself toward wheel well of car and needs assist to direct energy upward instead of to the side      Environmental Mobility  Ambulation  Surface: Level surface  Device: Quad Cane  Distance: 84' with LBQC  Activity: Within Unit  Additional Factors: Right AFO;Set-up;Increased time to complete  Assistance Level: Stand by assist;Minimal assistance  Gait Deviations: Slow butch;Decreased step length bilateral;Decreased weight shift right;Narrow base of support;Unsteady gait  Skilled Clinical Factors: pt with uneven pace

## 2024-02-19 NOTE — PROGRESS NOTES
Patient admitted to rehab with SAH.  A/Ox 4. Transfers with stand pivot to wheel chair. Mobility restrictions: brace on right foot when OOB. On regular diet, tolerating well. Medications taken whole in thins. On Lovenox for DVT prophylaxis.  Skin: scattered bruising; blanchable redness to coccyx. Oxygen: none. LDA: none. Has been continent of bowel and straight cath for bladder. LBM 2/18. Chair/bed alarms in use and call light in reach. Will monitor for safety.

## 2024-02-19 NOTE — PATIENT CARE CONFERENCE
[facility]  Inpatient Rehabilitation  Weekly Team Conference Note      Date: 2024  Patient Name:  Eric Ovalles    MRN: [id number]  : 1945  Gender: Male  Physician: Dr. Cynthia CHUA  Diagnosis: Subarachnoid hemorrhage (HCC) [I60.9]    CASE MANAGEMENT  Assessment: Goal is home with wife, agreeable to home care services.  Does Not want anything from [facility] but [facility]      PHYSICAL THERAPY    Bed Mobility:  Overall Assistance Level: Stand By Assist  Sit>supine:  Assistance Level: Stand by assist  Skilled Clinical Factors: with R LE only  Supine>sit:  Assistance Level: Stand by assist  Skilled Clinical Factors: with effort    Transfers:  Surface: Wheelchair  Additional Factors: Increased time to complete, Set-up  Device: Cane (LBQC)  Sit>stand:  Assistance Level: Stand by assist  Skilled Clinical Factors: sometimes takes wo tries  Stand>sit:  Assistance Level: Stand by assist  Skilled Clinical Factors: Good hand placement and technique  Bed<>chair  Stand pivot with LBQC SBA  Stand Pivot:  Assistance Level: close SBA  Skilled Clinical Factors: w/c <> recliner w/ LBQC  Lateral transfer:     Car transfer:  Assistance Level: Minimal assistance  Skilled Clinical Factors: SBA to get into car, SBA to get feet out of car, min A to stand from car with pt pulling up on door and PT stabilizing door, pt tends to pull himself toward wheel well of car and needs assist to direct energy upward instead of to the side    Ambulation:  Surface: Level surface  Device: Quad Cane  Distance: 84' with LBQC  Activity: Within Unit  Additional Factors: Right AFO, Set-up, Increased time to complete  Assistance Level: Stand by assist, Minimal assistance  Gait Deviations: Slow butch, Decreased step length bilateral, Decreased weight shift right, Narrow base of support, Unsteady gait  Skilled Clinical Factors: pt with uneven pace and pattern, unable to walk a straight path, close SBA, one LOB when

## 2024-02-19 NOTE — CARE COORDINATION
Call placed to [facility] RN, Lary Acevedo of Dr Grider's office to confirm the Appt for Wed 2- at 11:00 am and that they have arranged for transport.  Informed her Dr Marie is okay with this appt as long as they are providing the transport.  She will need to call back.  NICK Milner     Case Management   [phone]    2/19/2024  4:37 PM

## 2024-02-19 NOTE — PLAN OF CARE
Problem: Safety - Adult  Goal: Free from fall injury  2/19/2024 1139 by Ayaka Barnes RN  Outcome: Progressing  Flowsheets (Taken 2/18/2024 2217 by Cori Anthony, RN)  Free From Fall Injury: Instruct family/caregiver on patient safety  2/18/2024 2215 by Cori Anthony, RN  Outcome: Progressing  Flowsheets (Taken 2/18/2024 1206 by Sudha Collado RN)  Free From Fall Injury: Instruct family/caregiver on patient safety     Problem: ABCDS Injury Assessment  Goal: Absence of physical injury  2/19/2024 1139 by Ayaka Barnes RN  Outcome: Progressing  Flowsheets (Taken 2/18/2024 2217 by Cori Anthony, RN)  Absence of Physical Injury: Implement safety measures based on patient assessment  2/18/2024 2215 by Cori Anthony, RN  Outcome: Progressing
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
## 2024-02-19 NOTE — PLAN OF CARE
Problem: Discharge Planning  Goal: Discharge to home or other facility with appropriate resources  2/18/2024 2215 by Cori Anthony, RN  Outcome: Progressing     Problem: Pain  Goal: Verbalizes/displays adequate comfort level or baseline comfort level  2/18/2024 2215 by Cori Anthony RN  Outcome: Progressing  Flowsheets (Taken 2/18/2024 1236 by Sudha Collado RN)  Verbalizes/displays adequate comfort level or baseline comfort level:   Encourage patient to monitor pain and request assistance   Administer analgesics based on type and severity of pain and evaluate response   Assess pain using appropriate pain scale   Implement non-pharmacological measures as appropriate and evaluate response

## 2024-02-20 PROCEDURE — 94760 N-INVAS EAR/PLS OXIMETRY 1: CPT

## 2024-02-20 PROCEDURE — 97110 THERAPEUTIC EXERCISES: CPT

## 2024-02-20 PROCEDURE — 51798 US URINE CAPACITY MEASURE: CPT

## 2024-02-20 PROCEDURE — 51701 INSERT BLADDER CATHETER: CPT

## 2024-02-20 PROCEDURE — 97535 SELF CARE MNGMENT TRAINING: CPT

## 2024-02-20 PROCEDURE — 6370000000 HC RX 637 (ALT 250 FOR IP): Performed by: STUDENT IN AN ORGANIZED HEALTH CARE EDUCATION/TRAINING PROGRAM

## 2024-02-20 PROCEDURE — 1280000000 HC REHAB R&B

## 2024-02-20 PROCEDURE — 6370000000 HC RX 637 (ALT 250 FOR IP): Performed by: PHYSICAL MEDICINE & REHABILITATION

## 2024-02-20 PROCEDURE — 6360000002 HC RX W HCPCS: Performed by: PHYSICAL MEDICINE & REHABILITATION

## 2024-02-20 PROCEDURE — 97530 THERAPEUTIC ACTIVITIES: CPT

## 2024-02-20 PROCEDURE — 97116 GAIT TRAINING THERAPY: CPT

## 2024-02-20 RX ORDER — FOLIC ACID 1 MG/1
1 TABLET ORAL DAILY
Qty: 30 TABLET | Refills: 0 | Status: SHIPPED | OUTPATIENT
Start: 2024-02-21 | End: 2024-02-22

## 2024-02-20 RX ORDER — PANTOPRAZOLE SODIUM 40 MG/1
40 TABLET, DELAYED RELEASE ORAL 2 TIMES DAILY
Qty: 60 TABLET | Refills: 0 | Status: SHIPPED | OUTPATIENT
Start: 2024-02-20 | End: 2024-02-22

## 2024-02-20 RX ORDER — TAMSULOSIN HYDROCHLORIDE 0.4 MG/1
0.4 CAPSULE ORAL NIGHTLY
Qty: 30 CAPSULE | Refills: 0 | Status: SHIPPED | OUTPATIENT
Start: 2024-02-20 | End: 2024-02-22

## 2024-02-20 RX ORDER — CARVEDILOL 3.12 MG/1
3.12 TABLET ORAL 2 TIMES DAILY WITH MEALS
Qty: 60 TABLET | Refills: 0 | Status: SHIPPED | OUTPATIENT
Start: 2024-02-20 | End: 2024-02-22

## 2024-02-20 RX ORDER — ENOXAPARIN SODIUM 100 MG/ML
80 INJECTION SUBCUTANEOUS 2 TIMES DAILY
Qty: 104 ML | Refills: 0 | Status: SHIPPED | OUTPATIENT
Start: 2024-02-20 | End: 2024-02-22

## 2024-02-20 RX ORDER — LANOLIN ALCOHOL/MO/W.PET/CERES
400 CREAM (GRAM) TOPICAL
Qty: 30 TABLET | Refills: 0 | Status: SHIPPED | OUTPATIENT
Start: 2024-02-21 | End: 2024-02-22

## 2024-02-20 RX ADMIN — SENNOSIDES AND DOCUSATE SODIUM 1 TABLET: 8.6; 5 TABLET ORAL at 21:50

## 2024-02-20 RX ADMIN — CARVEDILOL 3.12 MG: 3.12 TABLET, FILM COATED ORAL at 08:39

## 2024-02-20 RX ADMIN — Medication 400 MG: at 08:39

## 2024-02-20 RX ADMIN — LAMOTRIGINE 200 MG: 100 TABLET ORAL at 08:39

## 2024-02-20 RX ADMIN — SENNOSIDES AND DOCUSATE SODIUM 1 TABLET: 8.6; 5 TABLET ORAL at 08:39

## 2024-02-20 RX ADMIN — BACLOFEN 10 MG: 10 TABLET ORAL at 08:39

## 2024-02-20 RX ADMIN — PANTOPRAZOLE SODIUM 40 MG: 40 TABLET, DELAYED RELEASE ORAL at 17:57

## 2024-02-20 RX ADMIN — FOLIC ACID 1 MG: 1 TABLET ORAL at 08:39

## 2024-02-20 RX ADMIN — OXYCODONE HYDROCHLORIDE 10 MG: 10 TABLET, FILM COATED, EXTENDED RELEASE ORAL at 08:39

## 2024-02-20 RX ADMIN — OXYCODONE HYDROCHLORIDE 10 MG: 10 TABLET, FILM COATED, EXTENDED RELEASE ORAL at 21:50

## 2024-02-20 RX ADMIN — PHENYTOIN SODIUM 100 MG: 100 CAPSULE ORAL at 08:39

## 2024-02-20 RX ADMIN — CARVEDILOL 3.12 MG: 3.12 TABLET, FILM COATED ORAL at 17:57

## 2024-02-20 RX ADMIN — DICLOFENAC SODIUM 4 G: 10 GEL TOPICAL at 13:54

## 2024-02-20 RX ADMIN — ACETAMINOPHEN 1000 MG: 500 TABLET ORAL at 21:49

## 2024-02-20 RX ADMIN — ATORVASTATIN CALCIUM 80 MG: 80 TABLET, FILM COATED ORAL at 21:50

## 2024-02-20 RX ADMIN — BACLOFEN 10 MG: 10 TABLET ORAL at 13:54

## 2024-02-20 RX ADMIN — PHENYTOIN SODIUM 100 MG: 100 CAPSULE ORAL at 13:54

## 2024-02-20 RX ADMIN — TAMSULOSIN HYDROCHLORIDE 0.4 MG: 0.4 CAPSULE ORAL at 21:50

## 2024-02-20 RX ADMIN — ACETAMINOPHEN 1000 MG: 500 TABLET ORAL at 05:29

## 2024-02-20 RX ADMIN — ENOXAPARIN SODIUM 80 MG: 100 INJECTION SUBCUTANEOUS at 21:49

## 2024-02-20 RX ADMIN — DICLOFENAC SODIUM 4 G: 10 GEL TOPICAL at 08:40

## 2024-02-20 RX ADMIN — BACLOFEN 10 MG: 10 TABLET ORAL at 21:50

## 2024-02-20 RX ADMIN — ENOXAPARIN SODIUM 80 MG: 100 INJECTION SUBCUTANEOUS at 08:37

## 2024-02-20 RX ADMIN — PHENYTOIN SODIUM 100 MG: 100 CAPSULE ORAL at 21:50

## 2024-02-20 RX ADMIN — ACETAMINOPHEN 1000 MG: 500 TABLET ORAL at 13:54

## 2024-02-20 RX ADMIN — PANTOPRAZOLE SODIUM 40 MG: 40 TABLET, DELAYED RELEASE ORAL at 05:30

## 2024-02-20 RX ADMIN — LAMOTRIGINE 200 MG: 100 TABLET ORAL at 21:50

## 2024-02-20 ASSESSMENT — PAIN - FUNCTIONAL ASSESSMENT: PAIN_FUNCTIONAL_ASSESSMENT: PREVENTS OR INTERFERES WITH MANY ACTIVE NOT PASSIVE ACTIVITIES

## 2024-02-20 ASSESSMENT — PAIN SCALES - WONG BAKER
WONGBAKER_NUMERICALRESPONSE: 0
WONGBAKER_NUMERICALRESPONSE: 0
WONGBAKER_NUMERICALRESPONSE: 2

## 2024-02-20 ASSESSMENT — PAIN DESCRIPTION - DESCRIPTORS
DESCRIPTORS: ACHING;DISCOMFORT
DESCRIPTORS: ACHING;DISCOMFORT

## 2024-02-20 ASSESSMENT — PAIN DESCRIPTION - ORIENTATION: ORIENTATION: OTHER (COMMENT)

## 2024-02-20 ASSESSMENT — PAIN SCALES - GENERAL
PAINLEVEL_OUTOF10: 6
PAINLEVEL_OUTOF10: 0
PAINLEVEL_OUTOF10: 3
PAINLEVEL_OUTOF10: 3

## 2024-02-20 ASSESSMENT — PAIN DESCRIPTION - LOCATION
LOCATION: LEG;GROIN;HIP
LOCATION: GENERALIZED

## 2024-02-20 NOTE — CARE COORDINATION
Emailed written scripts from Dr Marie and Home Care orders to Lary [facility]ved[facility] per her request.  They will need to be mailed once filled to pt's home.  Updated wife to inform this was completed.  NICK Milner     Case Management   [phone]    2/20/2024  4:17 PM

## 2024-02-20 NOTE — PLAN OF CARE
Problem: Discharge Planning  Goal: Discharge to home or other facility with appropriate resources  2/20/2024 1013 by Lidia Pederson RN  Outcome: Progressing  Flowsheets (Taken 2/20/2024 0831)  Discharge to home or other facility with appropriate resources:   Identify barriers to discharge with patient and caregiver   Arrange for needed discharge resources and transportation as appropriate   Identify discharge learning needs (meds, wound care, etc)   Refer to discharge planning if patient needs post-hospital services based on physician order or complex needs related to functional status, cognitive ability or social support system     Problem: Safety - Adult  Goal: Free from fall injury  2/20/2024 1013 by Lidia Pederson RN  Outcome: Progressing  Flowsheets (Taken 2/20/2024 0832)  Free From Fall Injury: Instruct family/caregiver on patient safety     Problem: ABCDS Injury Assessment  Goal: Absence of physical injury  2/20/2024 1013 by Lidia Pederson RN  Outcome: Progressing  Flowsheets (Taken 2/20/2024 0832)  Absence of Physical Injury: Implement safety measures based on patient assessment     Problem: Pain  Goal: Verbalizes/displays adequate comfort level or baseline comfort level  2/20/2024 1013 by Lidia Pederson RN  Outcome: Progressing  Flowsheets (Taken 2/20/2024 0831)  Verbalizes/displays adequate comfort level or baseline comfort level:   Encourage patient to monitor pain and request assistance   Assess pain using appropriate pain scale   Administer analgesics based on type and severity of pain and evaluate response   Implement non-pharmacological measures as appropriate and evaluate response     Problem: Nutrition Deficit:  Goal: Optimize nutritional status  2/20/2024 1013 by Lidia Pederson RN  Outcome: Progressing     Problem: Skin/Tissue Integrity  Goal: Absence of new skin breakdown  Description: 1.  Monitor for areas of redness and/or skin breakdown  2.  Assess vascular access sites hourly  3.

## 2024-02-20 NOTE — PROGRESS NOTES
Department of Physical Medicine & Rehabilitation  Progress Note    Patient Identification:  Eric Ovalles  [id number]  : 1945  Admit date: 2024    Chief Complaint: Subarachnoid hemorrhage (HCC)    Subjective:   No acute events overnight.   Patient seen this afternoon sitting up in room.  Denies new concerns.  Reports constipation has improved.  Wife updated at the bedside.  We discussed plans for home care and medications.  Labs reviewed.     ROS: No f/c, n/v, cp     Objective:  Patient Vitals for the past 24 hrs:   BP Temp Temp src Pulse Resp SpO2 Weight   24 0955 -- -- -- -- -- 96 % --   24 0831 121/66 98.6 °F (37 °C) Oral 91 17 94 % --   24 0545 -- -- -- -- -- -- 80.8 kg (178 lb 2.1 oz)   24 -- -- -- -- 16 -- --   24 1945 139/61 99.7 °F (37.6 °C) Oral 88 16 98 % --   24 1606 137/66 -- -- 89 -- -- --     Const: Alert. No distress, pleasant.   HEENT: Normocephalic, atraumatic. Normal sclera/conjunctiva. MMM.   CV: Regular rate and rhythm.   Resp: No respiratory distress. Lungs distant/diminished at bases   Abd: Soft, nontender, nondistended, NABS+   Ext: +RUE/RLE edema  MSK: right hip ROM limited   Neuro: Alert, oriented, appropriately interactive. Mildly impaired recall. Baseline right hemiparesis.   Psych: Cooperative, appropriate mood and affect    Laboratory data: Available via EMR.   Last 24 hour lab  No results found for this or any previous visit (from the past 24 hour(s)).                Therapy progress:  Physical therapy:  Bed Mobility:  Overall Assistance Level: Stand By Assist  Sit>supine:  Assistance Level: Stand by assist  Skilled Clinical Factors: with R LE only  Supine>sit:  Assistance Level: Stand by assist  Skilled Clinical Factors: with effort  Transfers:  Surface: Wheelchair  Additional Factors: Increased time to complete, Set-up  Device: Cane (LBQC)  Sit>stand:  Assistance Level: Stand by assist, Contact guard assist  Skilled Clinical  Factors: with repetition - CGA from recliner, SBA from w/c  Stand>sit:  Assistance Level: Stand by assist  Skilled Clinical Factors: Good hand placement and technique  Bed<>chair  Skilled Clinical Factors: used tanisha lidia because pt threw up right before transfer and pt feeling weak  Stand Pivot:  Assistance Level: Minimal assistance  Skilled Clinical Factors: w/c <> recliner w/ LBQC  Lateral transfer:     Car transfer:  Assistance Level: Stand by assist  Skilled Clinical Factors: to pt's own car (SUV)  Ambulation:  Surface: Level surface, Uneven surface, Ramp (on outdoor patio)  Device: Quad Cane  Distance: 120', 75' on patio, smaller distances with transfers  Activity: Within Unit  Additional Factors: Right AFO, Set-up, Increased time to complete  Assistance Level: Stand by assist, Contact guard assist (SBA indoors, CGA on patio and ramp)  Gait Deviations: Slow butch, Decreased step length bilateral, Decreased weight shift right, Narrow base of support, Unsteady gait  Skilled Clinical Factors: good step length LLE, slides RLE w/ AFO - patient able to maintain standing balance with pausing to let someone pass in front w/o LOB  Stairs:  Stair Height: 6''  Device: One handrail (holds rail with L hand)  Number of Stairs: 4  Additional Factors: Right AFO, Non-reciprocal going up, Non-reciprocal going down, Increased time to complete  Assistance Level: Contact guard assist  Skilled Clinical Factors: CGA up and down, did not need any help to bring R foot up and down from the step, no LOB  Curb:  Curb Height: 6''  Device: Quad Cane  Number of Curbs: 1  Additional Factors: Increased time to complete, Right AFO, Verbal cues, Set-up  Assistance Level: Minimal assistance  Skilled Clinical Factors: Up with LLE and min A down with LLE and CGA  Wheelchair:  Surface: Level surface  Device: Standard wheelchair  Assistance Required to Manage Parts: Left leg rest  Assistance Level for Propulsion: Stand by assist  Propulsion

## 2024-02-20 NOTE — FLOWSHEET NOTE
Patient admitted to ARU with Dx of SAH after a mechanical fall at home.  A/Ox 4. Now transfers by  stand and pivot to wheel chair, bed and commode, using his quad cane. Mobility restrictions: brace on right foot when OOB. Right sided weakness. On regular diet, tolerating well. Medications taken whole in thins. On Lovenox for DVT prophylaxis.  Skin: scattered bruising; blanchable redness to coccyx. Oxygen: none. LDA: none. Has been continent of bowel and straight cath for bladder. Bladder scan at hs after a big BM shows only 64 cc. LBM 2/19. Chair/bed alarms in use and call light within reach.

## 2024-02-20 NOTE — PROGRESS NOTES
Physical Therapy  Facility/Department: [address] REHAB  Rehabilitation Physical Therapy Treatment Note    NAME: Eric Ovalles  : 1945 (78 y.o.)  MRN: [id number]  CODE STATUS: Full Code    Date of Service: 24       Restrictions:  Restrictions/Precautions: Aspiration Risk, Fall Risk  Position Activity Restriction  Other position/activity restrictions: intermittant catheterization as PTA     SUBJECTIVE  Subjective  Subjective: Pt reports he feels most confident with walking right now (wide open space for shorter distances at home). Pt reports family putting in ramp for 1+1 JOSE.  Pain: no pain reported this morning       OBJECTIVE  Cognition  Overall Cognitive Status: Exceptions  Attention Span: Appears intact  Safety Judgement: Decreased awareness of need for safety  Problem Solving: Assistance required to implement solutions;Assistance required to generate solutions  Insights: Decreased awareness of deficits  Orientation  Overall Orientation Status: Within Functional Limits    Functional Mobility  Balance  Sitting Balance: Supervision  Standing Balance: Stand by assistance  Transfers  Surface: Wheelchair;From chair with arms;To chair with arms  Additional Factors: Increased time to complete;Set-up  Device: Cane (LBQC)  Sit to Stand  Assistance Level: Stand by assist;Contact guard assist;Moderate assistance  Skilled Clinical Factors: with repetition - has one stand from lower chair with arms and attempts to stand from elevated table - has increased knee flexion and Mod A to recover standing balance with lean toward the Left (educated on safety with pushing from the chair and increased forward lean)  Stand to Sit  Assistance Level: Stand by assist  Skilled Clinical Factors: Good hand placement and technique      Environmental Mobility  Ambulation  Surface: Level surface  Device: Quad Cane  Distance: 38' x 2 with LBQC  Activity: Within Unit  Additional Factors: Right AFO;Set-up;Increased time to  with improved mechanics). Patient improved standing tolerance with cues for correcting upright posture while performing standing tasks and reaching toward the Right with LUE. He would benefit from continued skilled PT on our ARU to maximize independence and safety with bed mobility, transfers, amb and stairs to enter/exit home prior to D/C anticipated on Friday, Feb. 23, 2024 with HHPT.  Activity Tolerance: Patient tolerated treatment well  Discharge Recommendations: Continue to assess pending progress;Patient would benefit from continued therapy after discharge;Home with Home health PT;24 hour supervision or assist    Goals  Patient Goals   Patient Goals : Pt wants to be able to amb independently again  Short Term Goals  Time Frame for Short Term Goals: 1 week  Short Term Goal 1: Bed mobility with CGA  Short Term Goal 2: Transfers with CGA met 2-13  Short Term Goal 3: Amb 50' with min A LBQC met 2-12  Short Term Goal 4: Amb up/down 4 steps with Min A met 2-13  Short Term Goal 5: Amb up/down curb with min A  Long Term Goals  Time Frame for Long Term Goals : 2 weeks  Long Term Goal 1: Bed mobility with MI  Long Term Goal 2: Transfers with MI  Long Term Goal 3: Amb 100' with MI LBQC  Long Term Goal 4: Amb up/down 4 steps with CGA  Long Term Goal 5: Amb up/down curb with CGA    PLAN OF CARE/SAFETY  Physical Therapy Plan  General Plan:  minutes of therapy at least 5 out of 7 days a week  Days Per Week: 5 Days  Hours Per Day: 1.5 hours  Therapy Duration: 2 Weeks  Current Treatment Recommendations: Strengthening;Balance training;Functional mobility training;Transfer training;Endurance training;Gait training;Stair training;Neuromuscular re-education;Home exercise program;Safety education & training;Patient/Caregiver education & training;Equipment evaluation, education, & procurement;Therapeutic activities  Safety Devices  Type of Devices: All fall risk precautions in place;Gait belt;Patient at risk for falls;Left in

## 2024-02-20 NOTE — PROGRESS NOTES
Physical Therapy  Facility/Department: [address] REHAB  Rehabilitation Physical Therapy Treatment Note    NAME: Eric Ovalles  : 1945 (78 y.o.)  MRN: [id number]  CODE STATUS: Full Code    Date of Service: 24       Restrictions:  Restrictions/Precautions: Aspiration Risk, Fall Risk  Position Activity Restriction  Other position/activity restrictions: intermittant catheterization as PTA     SUBJECTIVE  Subjective  Pain: no pain reported this morning      OBJECTIVE  Cognition  Overall Cognitive Status: Exceptions  Attention Span: Appears intact  Safety Judgement: Decreased awareness of need for safety  Problem Solving: Assistance required to implement solutions;Assistance required to generate solutions  Insights: Decreased awareness of deficits  Orientation  Overall Orientation Status: Within Functional Limits    Functional Mobility  Transfers  Surface: Wheelchair  Sit to Stand  Assistance Level: Stand by assist;Contact guard assist  Skilled Clinical Factors: with repetition - CGA from recliner, SBA from w/c  Stand to Sit  Assistance Level: Stand by assist  Skilled Clinical Factors: Good hand placement and technique  Car Transfer  Assistance Level: Stand by assist  Skilled Clinical Factors: to pt's own car ([employer])      Environmental Mobility  Ambulation  Surface: Level surface;Uneven surface;Ramp (on outdoor patio)  Device: Quad Cane  Distance: 120', 75' on patio, smaller distances with transfers  Activity: Within Unit  Additional Factors: Right AFO;Set-up;Increased time to complete  Assistance Level: Stand by assist;Contact guard assist (SBA indoors, CGA on patio and ramp)  Gait Deviations: Slow butch;Decreased step length bilateral;Decreased weight shift right;Narrow base of support;Unsteady gait      ASSESSMENT/PROGRESS TOWARDS GOALS       Assessment  Assessment: Mr. Ovalles presents with weakness s/p subarachnoid hemorrhage. PLOF pt lives with his wife in a 1 story home with finished basement,

## 2024-02-20 NOTE — PLAN OF CARE
Problem: Discharge Planning  Goal: Discharge to home or other facility with appropriate resources  Outcome: Progressing     Problem: Safety - Adult  Goal: Free from fall injury  2/20/2024 0019 by Silvana De Jesus RN  Outcome: Progressing  2/19/2024 1139 by Ayaka Barnes RN  Outcome: Progressing  Flowsheets (Taken 2/18/2024 2217 by Cori Anthony RN)  Free From Fall Injury: Instruct family/caregiver on patient safety     Problem: ABCDS Injury Assessment  Goal: Absence of physical injury  2/20/2024 0019 by Silvana De Jesus RN  Outcome: Progressing  2/19/2024 1139 by Ayaka Barnes RN  Outcome: Progressing  Flowsheets (Taken 2/18/2024 2217 by Cori Anthony RN)  Absence of Physical Injury: Implement safety measures based on patient assessment     Problem: Pain  Goal: Verbalizes/displays adequate comfort level or baseline comfort level  Outcome: Progressing     Problem: Nutrition Deficit:  Goal: Optimize nutritional status  Outcome: Progressing     Problem: Nutrition Deficit:  Goal: Optimize nutritional status  Outcome: Progressing     Problem: Skin/Tissue Integrity  Goal: Absence of new skin breakdown  Description: 1.  Monitor for areas of redness and/or skin breakdown  2.  Assess vascular access sites hourly  3.  Every 4-6 hours minimum:  Change oxygen saturation probe site  4.  Every 4-6 hours:  If on nasal continuous positive airway pressure, respiratory therapy assess nares and determine need for appliance change or resting period.  Outcome: Progressing     Problem: Musculoskeletal - Adult  Goal: Return mobility to safest level of function  Outcome: Progressing  Goal: Maintain proper alignment of affected body part  Outcome: Progressing  Goal: Return ADL status to a safe level of function  Outcome: Progressing     Problem: Genitourinary - Adult  Goal: Absence of urinary retention  Outcome: Progressing

## 2024-02-20 NOTE — PROGRESS NOTES
prefer it.  Transfers  Surface: To chair with arms;From chair with arms;Wheelchair;Raised toilet Seat  Device: Cane  Sit to Stand  Assistance Level: Stand by assist  Stand to Sit  Assistance Level: Stand by assist       PM session  Wife present, completed car transfer with PT.  She stood with him from wheelchair, amb to bathroom, trasnferred to raised commode with toilet safety frames, returned to wheelchair all with SBA.  In room, simulated stepping into his shower stall - able to step over 4 inch ledge into shower, sit to chair using grab bar and cane appropriately.  He was unable to lift right leg far enough to step out first, but was able to step out with left.    Pt to recliner, chair alarm in place, RUE elevated on pillow.  Nursing in for catheterization      Assessment  Assessment  Assessment: Pt improved with sit/stand today - SBA from raised commode, wheelchair, chair with arms.  Amb with quad cane and SBA.  Discussed safety with carrying items in cane/bag or over his shoulder/cross body.  He stated he does not carry many things, but may consider some type of bag.  Cont with plan, discharge 2/23/24, home OT assist PRN  Activity Tolerance: Patient tolerated treatment well  Discharge Recommendations: Home with assist PRN;Home with Home health OT  OT Equipment Recommendations  Equipment Needed: Yes  Other: has equipment: sock aid, reacher, shoe horn(\"standard\" per pt, not long), walk in shower w/built in shower seat, grab bars in shower and around toilet and handicapped ht toilet. has w/c, electric scooter. Current DME needs:  portable ramps to enter home, lift chair recliner, 1/2 bedrail--[facility]  Safety Devices  Safety Devices in place: Yes  Type of devices: Gait belt (to room per transport at end of session)    Patient Education  Education  Education Given To: Patient  Education Provided: Safety;Transfer Training;Mobility Training  Education Provided Comments: cane/bag  Education Method:  Demonstration;Verbal;Teach Back  Barriers to Learning: Cognition  Education Outcome: Verbalized understanding;Continued education needed;Demonstrated understanding    Plan  Occupational Therapy Plan  Times Per Week: 5-7x/wk  Times Per Day: Twice a day  Days Per Week: 5 Days  Hours Per Day: 1.5 hours  Current Treatment Recommendations: Balance training;Functional mobility training;Endurance training;Patient/Caregiver education & training;Equipment evaluation, education, & procurement;Self-Care / ADL;Strengthening;Wheelchair mobility training;Safety education & training    Goals  Patient Goals   Patient goals : I want to be able to get strong enough so I can go home and take care of myself  Short Term Goals  Time Frame for Short Term Goals: 1 week pt will...  Short Term Goal 1: bathe with min assist and AD as needed  Short Term Goal 2: dress UB with set up, LB with mod assist and AD as needed  Short Term Goal 3: toilet with mod assist for BM, dep with straight cath for urination  Short Term Goal 4: transfer with min assist and LRAD  Short Term Goal 5: functional mobility with min assist and LRAD  Short Term Goal 6: increase activity tolerance to stand 3 min for ADL tasks  Long Term Goals  Time Frame for Long Term Goals : 2 weeks pt will..  Long Term Goal 1: bathe with SBA and AD as needed  Long Term Goal 2: dress UB indep, LB with max assist with footwear, otherwise SBA  Long Term Goal 3: toilet indep for bowels, urination NA - catheterized PTA  Long Term Goal 4: transfer indep with LRAD  Long Term Goal 5: functional mobility indep with LRAD    AM-PAC Score               Therapy Time   Individual Concurrent Group Co-treatment   Time In 1000         Time Out 1045         Minutes 45         Timed Code Treatment Minutes: 45 Minutes   Therapy Time     Individual Co-treatment   Time In 1315     Time Out 1400     Minutes 45           Pamela Schwab, SALBADORR/L [id number]

## 2024-02-20 NOTE — CARE COORDINATION
Team Conference held today.  Team reviewed progress.  DC is planned for Friday to home  Pt's appt for tomorrow has been rescheduled per wife to another time in April.  Met with patient and wife to review.  She reports she is VERY concerned about the medications getting to their home as [facility].  She is also concerned about Lovenox injections and states she has been shown how to give but has not done it as it gets it very early in the morning and late at night when she is not present.  She is agreeable to home care services and I have verified with Dr Grider's office that these services have been requested but [facility] schedules [facility].  Discussion held regarding DME.    She reports she was told by the [facility] that they will have the ramps but no idea when it will be delivered.  Pt confirmed he has done stairs in therapy and will be able to manage the two steps to enter his home.  Discussion held regarding 1/2 bed rail, reclining lift chair.  Both are coming from [facility] but they do not have a date.  [facility] for a wheelchair that is motorized in near future.    NICK Milner     Case Management   [phone]    2/20/2024  3:15 PM

## 2024-02-20 NOTE — PROGRESS NOTES
Patient admitted to rehab with subarachnoid hemorrhage.  A/Ox4. Transfers with stand pivot with cane. Mobility restrictions: right side flaccid, AFO for right foot. On regular diet, tolerating well. Medications taken whole with thins. On lovenox, triston hose for DVT prophylaxis.  Skin: blanchable redness to buttocks. Oxygen: RA. LDA: none. Has been continent of bowel and continent of bladder. LBM 2/19/24. Chair/bed alarms in use and call light in reach. Will monitor for safety. Electronically signed by Lidia Pederson RN on 2/20/2024 at 10:52 AM

## 2024-02-21 PROCEDURE — 6360000002 HC RX W HCPCS: Performed by: PHYSICAL MEDICINE & REHABILITATION

## 2024-02-21 PROCEDURE — 6370000000 HC RX 637 (ALT 250 FOR IP): Performed by: STUDENT IN AN ORGANIZED HEALTH CARE EDUCATION/TRAINING PROGRAM

## 2024-02-21 PROCEDURE — 97530 THERAPEUTIC ACTIVITIES: CPT

## 2024-02-21 PROCEDURE — 97116 GAIT TRAINING THERAPY: CPT

## 2024-02-21 PROCEDURE — 6370000000 HC RX 637 (ALT 250 FOR IP): Performed by: PHYSICAL MEDICINE & REHABILITATION

## 2024-02-21 PROCEDURE — 1280000000 HC REHAB R&B

## 2024-02-21 PROCEDURE — 97535 SELF CARE MNGMENT TRAINING: CPT

## 2024-02-21 PROCEDURE — 94760 N-INVAS EAR/PLS OXIMETRY 1: CPT

## 2024-02-21 PROCEDURE — 97110 THERAPEUTIC EXERCISES: CPT

## 2024-02-21 PROCEDURE — 51701 INSERT BLADDER CATHETER: CPT

## 2024-02-21 RX ADMIN — TAMSULOSIN HYDROCHLORIDE 0.4 MG: 0.4 CAPSULE ORAL at 21:08

## 2024-02-21 RX ADMIN — CARVEDILOL 3.12 MG: 3.12 TABLET, FILM COATED ORAL at 07:56

## 2024-02-21 RX ADMIN — DICLOFENAC SODIUM 4 G: 10 GEL TOPICAL at 17:25

## 2024-02-21 RX ADMIN — DICLOFENAC SODIUM 4 G: 10 GEL TOPICAL at 21:22

## 2024-02-21 RX ADMIN — PHENYTOIN SODIUM 100 MG: 100 CAPSULE ORAL at 13:37

## 2024-02-21 RX ADMIN — PANTOPRAZOLE SODIUM 40 MG: 40 TABLET, DELAYED RELEASE ORAL at 06:02

## 2024-02-21 RX ADMIN — ACETAMINOPHEN 1000 MG: 500 TABLET ORAL at 13:37

## 2024-02-21 RX ADMIN — DICLOFENAC SODIUM 4 G: 10 GEL TOPICAL at 12:57

## 2024-02-21 RX ADMIN — LAMOTRIGINE 200 MG: 100 TABLET ORAL at 08:00

## 2024-02-21 RX ADMIN — ENOXAPARIN SODIUM 80 MG: 100 INJECTION SUBCUTANEOUS at 07:47

## 2024-02-21 RX ADMIN — CARVEDILOL 3.12 MG: 3.12 TABLET, FILM COATED ORAL at 16:40

## 2024-02-21 RX ADMIN — DICLOFENAC SODIUM 4 G: 10 GEL TOPICAL at 08:36

## 2024-02-21 RX ADMIN — BACLOFEN 10 MG: 10 TABLET ORAL at 21:09

## 2024-02-21 RX ADMIN — Medication 400 MG: at 07:57

## 2024-02-21 RX ADMIN — LAMOTRIGINE 200 MG: 100 TABLET ORAL at 21:07

## 2024-02-21 RX ADMIN — ACETAMINOPHEN 1000 MG: 500 TABLET ORAL at 06:02

## 2024-02-21 RX ADMIN — SENNOSIDES AND DOCUSATE SODIUM 1 TABLET: 8.6; 5 TABLET ORAL at 21:07

## 2024-02-21 RX ADMIN — ACETAMINOPHEN 1000 MG: 500 TABLET ORAL at 21:08

## 2024-02-21 RX ADMIN — FOLIC ACID 1 MG: 1 TABLET ORAL at 07:56

## 2024-02-21 RX ADMIN — BACLOFEN 10 MG: 10 TABLET ORAL at 13:37

## 2024-02-21 RX ADMIN — OXYCODONE HYDROCHLORIDE 10 MG: 10 TABLET, FILM COATED, EXTENDED RELEASE ORAL at 07:54

## 2024-02-21 RX ADMIN — ENOXAPARIN SODIUM 80 MG: 100 INJECTION SUBCUTANEOUS at 21:09

## 2024-02-21 RX ADMIN — BACLOFEN 10 MG: 10 TABLET ORAL at 07:55

## 2024-02-21 RX ADMIN — PHENYTOIN SODIUM 100 MG: 100 CAPSULE ORAL at 21:09

## 2024-02-21 RX ADMIN — ATORVASTATIN CALCIUM 80 MG: 80 TABLET, FILM COATED ORAL at 21:07

## 2024-02-21 RX ADMIN — SENNOSIDES AND DOCUSATE SODIUM 1 TABLET: 8.6; 5 TABLET ORAL at 07:59

## 2024-02-21 RX ADMIN — OXYCODONE HYDROCHLORIDE 10 MG: 10 TABLET, FILM COATED, EXTENDED RELEASE ORAL at 21:09

## 2024-02-21 RX ADMIN — PANTOPRAZOLE SODIUM 40 MG: 40 TABLET, DELAYED RELEASE ORAL at 15:48

## 2024-02-21 RX ADMIN — PHENYTOIN SODIUM 100 MG: 100 CAPSULE ORAL at 07:59

## 2024-02-21 ASSESSMENT — PAIN DESCRIPTION - LOCATION
LOCATION: BACK
LOCATION: BACK
LOCATION: GENERALIZED

## 2024-02-21 ASSESSMENT — PAIN SCALES - GENERAL
PAINLEVEL_OUTOF10: 0
PAINLEVEL_OUTOF10: 2
PAINLEVEL_OUTOF10: 5
PAINLEVEL_OUTOF10: 6
PAINLEVEL_OUTOF10: 3
PAINLEVEL_OUTOF10: 6
PAINLEVEL_OUTOF10: 0

## 2024-02-21 ASSESSMENT — PAIN DESCRIPTION - DESCRIPTORS
DESCRIPTORS: ACHING

## 2024-02-21 ASSESSMENT — PAIN DESCRIPTION - PAIN TYPE
TYPE: CHRONIC PAIN
TYPE: CHRONIC PAIN

## 2024-02-21 ASSESSMENT — PAIN DESCRIPTION - ORIENTATION
ORIENTATION: LOWER
ORIENTATION: LOWER

## 2024-02-21 NOTE — PROGRESS NOTES
Physical Therapy  Facility/Department: [address] REHAB  Rehabilitation Physical Therapy Treatment Note    NAME: Eric Ovalles  : 1945 (78 y.o.)  MRN: [id number]  CODE STATUS: Full Code    Date of Service: 24       Restrictions:  Restrictions/Precautions: Aspiration Risk, Fall Risk  Position Activity Restriction  Other position/activity restrictions: intermittant catheterization as PTA     SUBJECTIVE  Subjective  Subjective: Pt states that PT yesterday afternoon addressed mobility and any concerns with family education (spouse) - no additional concerns.  Pain: none          OBJECTIVE  Cognition  Overall Cognitive Status: Exceptions  Attention Span: Appears intact  Safety Judgement: Decreased awareness of need for safety  Problem Solving: Assistance required to implement solutions;Assistance required to generate solutions  Insights: Decreased awareness of deficits  Orientation  Overall Orientation Status: Within Functional Limits    Functional Mobility  Sit to Supine  Assistance Level: Minimal assistance  Skilled Clinical Factors: attempts hooking RLE with LLE - but unable to lift BLEs onto mat and requires Min A for RLE  Supine to Sit  Assistance Level: Minimal assistance  Skilled Clinical Factors: with effort; slides BLE off and uses momentum at trunk with up to Min A  Scooting  Assistance Level: Stand by assist  Balance  Sitting Balance: Supervision  Standing Balance: Stand by assistance  Transfers  Surface: Wheelchair;To mat;From mat  Additional Factors: Increased time to complete;Set-up;Verbal cues  Device: Cane (LBQC)  Sit to Stand  Assistance Level: Stand by assist  Skilled Clinical Factors: requires repetition and increasing forward lean  Stand to Sit  Assistance Level: Stand by assist  Skilled Clinical Factors: Good hand placement and technique  Bed To/From Chair  Technique: Stand step  Assistance Level: Stand by assist  Skilled Clinical Factors: w/c <> mat table w/ LBQC - cues for avoiding  SURGERY  Operative Note :  JOSE  Hernia Repair    Erick Quiñones  1966    Procedure Date: 01/19/24    Pre-op Diagnosis:   Right inguinal hernia with tiny umbilical hernia    Post-op Diagnosis:  Right inguinal hernia, indirect, wide-based sac  Small umbilical hernia  Procedure:   Laparoscopic right inguinal hernia repair, total extraperitoneal approach, with mesh  Open umbilical hernia repair    Surgeon: Jose    Assistant: Judy Tanner    Indications:  As above, with symptoms since 2020    Associated Issues:  Hypothyroidism  HTN on lisinopril, inderal  Heavy menses on oral iron  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    Findings:   As above    Recommendations:   Routine recuperation    EBL: minimal    Specimens:   none    Technique:     Gen. anesthetic was induced, IV abx (Kefzol) given, abdomen prepped with chloroprep and draped sterilely.  There was no Mason catheter placed.  SCD garments were in place.    Incision was made below the umbilicus, semicircular in fashion, using a 15 blade, to begin approach to the umbilical hernia.  Since were using I dissected down until fatty tissue was encountered, lifted up and resected.  I then made an effort to go at least 2 cm inferior to that to make my incision on the anterior rectus for exposure to the extraperitoneal space.  This was done transversely, with an 11 blade, beginning at the midline and extending to the right side.  S retractors were used to dissect through the subcutaneous fatty tissue to the fascia, which was then opened with an 11 blade from the midline out laterally.  S retractors were placed in order to pull the muscle out laterally from the midline, and then the second S retractor placed posterior to the muscle and used to slide down into the extraperitoneal space to begin dissection in that plain.    Then the moistened extrawide autosuture dissecting balloon was placed thru that opening, in the medial aspect, posterior to the  "muscle in the extraperitoneal space down to the pubic bone.  It was then insufflated under direct vision.  There was good visualization of the epigastric vessels coursing anteriorly, no recognition of the pubic tubercle and the pelvic bone.  Dissection was stopped prior to visualization of the bone in order to diminish distraction of the anterior tissues including the rectus muscles.  Dissection carried out by the balloon was good and extended to the right well, but none to the left.    We then exchanged the dissecting balloon for a 10 mm balloon trocar, and insufflated the space with 15 mmHg.  Two additional trocars were placed below that in the midline, 5 mm each.    We began dissection at the pubic tubercle and extended out laterally, using the blunt dissectors with quick visualization of the pubic tubercle and the pelvic bone.   There was not a direct component.  Dissection of the peritoneal reflection was tedious, there being a wide-based component, ultimately with complete dissection back until I got to the round ligament the junction of the epigastric vessels and the iliac vessels was dissected free.  The pelvic space ultimately was completely clear other than the round ligament which was hemalocked on either side, then divided.    3-D mid weight contour mesh was selected, the large size, and placed through the 10 mm trocar site, and oriented, large size the mesh was then secured at the pubic tubercle and out laterally, with 6 Absorba Tacks along the pelvic bone.  Laterally, along the musculature, 3 additional tacs were placed, taking care to make sure that the peritoneum was pulled superior and lateral to all edges of the mesh.  My intent is to have a \"hammock\" type arrangement of the mesh, to cradle the bowel contents such that the anterior aspect would cover the defect and the more lateral and posterior aspect prevent the bowel and retroperitoneum from creeping under the mesh.  The mesh in this case laid " very nicely, my confirming that the sac would lay cephalad to the mesh upon removing the air prior to doing so.  The mesh was watched as we released the air in the extraperitoneal space to ensure that it retained the desired orientation.    All trocars were removed.  There was no abdominal pneumoperitoneum and thus no angiocath was used thru the fascia posterior to the infraumbilical trochar site for decompression.  The 10 mm rectus fascial opening was closed with a 0 Vicryl figure-of-eight.    I then dissected more cephalad until I came to the umbilical stalk, lifted that, and identified the site of the umbilical hernia.  I dissected all of fat around that and cleaned up the fascial edge, that defect being about 2 cm.  That was then closed with interrupted 0 Ethibonds transversely.  The umbilical stalk was tacked back down.  The subcutaneous tissue was reapproximated with interrupted 3-0 Vicryl's, dermis with 3-0 Vicryl's, skin with 5-0 Vicryl.  Exofin .    Frances Garcia MD    09:39 EST      Assistant was responsible for performing the following activities: Retraction, Suturing, Closing, Placing Dressing, and Held/Positioned Camera and their skilled assistance was necessary for the success of this case.

## 2024-02-21 NOTE — PROGRESS NOTES
Patient admitted to rehab with subarachnoid hemorrhage.  A/Ox4. Transfers with stand pivot with cane. Mobility restrictions: right side flaccid, AFO for right foot. On regular diet, tolerating well. Medications taken whole with thins. On lovenox, triston hose for DVT prophylaxis.  Skin: buttocks red. Oxygen: RA. LDA: none. Has been continent of bowel and SC q 8 hr for bladder. Last straight cath performed at approx 22:00 for 500 mL. LBM 2/19/24.  No concerns voiced this shift. Pt resting quietly in bed at this time. Respirations easy and even. Bed alarm on and call light in reach.

## 2024-02-21 NOTE — PLAN OF CARE
Problem: Discharge Planning  Goal: Discharge to home or other facility with appropriate resources  Outcome: Progressing  Flowsheets (Taken 2/21/2024 0045)  Discharge to home or other facility with appropriate resources:   Identify barriers to discharge with patient and caregiver   Identify discharge learning needs (meds, wound care, etc)     Problem: Safety - Adult  Goal: Free from fall injury  Outcome: Progressing  Note: Fall risk band on patient. Orange light on outside of room. Non skid footwear in place. Alarms used appropriately. Patient instructed to call and wait for staff before getting up. Rounding done to anticipate needs. Appropriate safety devices used for transfers.       Problem: ABCDS Injury Assessment  Goal: Absence of physical injury  Outcome: Progressing  Flowsheets (Taken 2/21/2024 0045)  Absence of Physical Injury: Implement safety measures based on patient assessment     Problem: Pain  Goal: Verbalizes/displays adequate comfort level or baseline comfort level  Outcome: Progressing  Flowsheets (Taken 2/21/2024 0045)  Verbalizes/displays adequate comfort level or baseline comfort level:   Encourage patient to monitor pain and request assistance   Assess pain using appropriate pain scale   Administer analgesics based on type and severity of pain and evaluate response   Implement non-pharmacological measures as appropriate and evaluate response     Problem: Skin/Tissue Integrity  Goal: Absence of new skin breakdown  Description: 1.  Monitor for areas of redness and/or skin breakdown  2.  Assess vascular access sites hourly  3.  Every 4-6 hours minimum:  Change oxygen saturation probe site  4.  Every 4-6 hours:  If on nasal continuous positive airway pressure, respiratory therapy assess nares and determine need for appliance change or resting period.  Outcome: Progressing  Note: Skin assessment completed on admission and every shift. Barrier wipes used in the event of incontinence. Pressure relief
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
## 2024-02-21 NOTE — CARE COORDINATION
Rec'd message from [facility] who reports they [facility] referral from [facility] for [facility]'s discharge.    Call placed to [facility] Coord[facility], [phone] ext [phone], Left message asking for wife's preferred home care agency be sent to [facility].  NICK Milner     Case Management   [phone]    2/21/2024  10:05 AM

## 2024-02-21 NOTE — PROGRESS NOTES
Patient admitted to rehab with SAH, falls.  A/Ox4. Transfers with stand pivot with or without cane, at times multiple attempts to stand. Mobility restrictions: cues for right arm neglect, brace to right leg. On regular diet, tolerating well. Medications taken whole. On Lovenox for DVT prophylaxis, wife verbalizes understanding.  Skin: monitor. Has been continent of bowel, straight cath for bladder, wife does at home.  LBM today. Chair/bed alarms in use and call light in reach. Will monitor for safety.

## 2024-02-21 NOTE — PROGRESS NOTES
Department of Physical Medicine & Rehabilitation  Progress Note    Patient Identification:  Eric Ovalles  [id number]  : 1945  Admit date: 2024    Chief Complaint: Subarachnoid hemorrhage (HCC)    Subjective:   No acute events overnight.   Patient seen this am sitting up in gym.  He reports he is doing well.  Feeling more confident about discharge home at the end of the week.  Wife has been working with therapy.  Labs reviewed.     ROS: No f/c, n/v, cp     Objective:  Patient Vitals for the past 24 hrs:   BP Temp Temp src Pulse Resp SpO2 Weight   24 0919 -- -- -- -- -- 96 % --   24 0754 -- -- -- -- 17 -- --   24 0752 (!) 145/70 98.6 °F (37 °C) Oral 94 17 96 % --   24 0613 -- -- -- -- -- -- 80.8 kg (178 lb 2.1 oz)   24 2220 -- -- -- -- 17 -- --   24 2147 131/64 98.1 °F (36.7 °C) Oral 91 17 97 % --   24 1757 (!) 111/56 -- -- 98 -- -- --     Const: Alert. No distress, pleasant.   HEENT: Normocephalic, atraumatic. Normal sclera/conjunctiva. MMM.   CV: Regular rate and rhythm.   Resp: No respiratory distress. Lungs distant/diminished at bases   Abd: Soft, nontender, nondistended, NABS+   Ext: +RUE/RLE edema  MSK: right hip ROM limited   Neuro: Alert, oriented, appropriately interactive. Mildly impaired recall. Baseline right hemiparesis.   Psych: Cooperative, appropriate mood and affect    Laboratory data: Available via EMR.   Last 24 hour lab  No results found for this or any previous visit (from the past 24 hour(s)).                Therapy progress:  Physical therapy:  Bed Mobility:  Overall Assistance Level: Stand By Assist  Sit>supine:  Assistance Level: Stand by assist  Skilled Clinical Factors: with R LE only  Supine>sit:  Assistance Level: Stand by assist  Skilled Clinical Factors: with effort  Transfers:  Surface: Wheelchair, From chair with arms, To chair with arms  Additional Factors: Increased time to complete, Set-up  Device: Cane  scheduled Q6 hours  -continue Flomax  -consider resuming home oxybutynin if having symptoms     Neurogenic Bowel: constipation  -senna+colace BID, PRN miralax, MoM, and bisacodyl supp.     Safety   -fall and aspiration precautions     PPx  -DVT: therapeutic lovenox for PEs as above  -GI: pantoprazole    Rehab Progress:  Making progress. Overall SBA-CGA for transfers and ambulation, SBA for wheelchair propulsion, Wanda- Karina for ADLs. Barriers include: pain, baseline right hemiparesis and sensory deficit, endurance, cognition.  Anticipated Dispo: home with spouse  Services:  PT, OT, RN  DME: ramp, 1/2 bed rail, transport wheelchair  ELOS: 2/23      Padmini Marie MD 2/21/2024, 10:08 AM

## 2024-02-21 NOTE — PLAN OF CARE
Problem: Discharge Planning  Goal: Discharge to home or other facility with appropriate resources  2/21/2024 1114 by Sudha Collado RN  Outcome: Progressing  Flowsheets  Taken 2/21/2024 1114  Discharge to home or other facility with appropriate resources:   Identify barriers to discharge with patient and caregiver   Identify discharge learning needs (meds, wound care, etc)  Taken 2/21/2024 0752  Discharge to home or other facility with appropriate resources: Identify barriers to discharge with patient and caregiver  Note: Dc planned for 02/23/2024 wife does straight caths and gives medications.  2/21/2024 0045 by Madina Mayorga, RN  Outcome: Progressing  Flowsheets (Taken 2/21/2024 0045)  Discharge to home or other facility with appropriate resources:   Identify barriers to discharge with patient and caregiver   Identify discharge learning needs (meds, wound care, etc)     Problem: Safety - Adult  Goal: Free from fall injury  2/21/2024 1114 by Sudha Collado RN  Outcome: Progressing  Flowsheets (Taken 2/21/2024 1045)  Free From Fall Injury: Instruct family/caregiver on patient safety  2/21/2024 0045 by Madina Mayorga, RN  Outcome: Progressing  Note: Fall risk band on patient. Orange light on outside of room. Non skid footwear in place. Alarms used appropriately. Patient instructed to call and wait for staff before getting up. Rounding done to anticipate needs. Appropriate safety devices used for transfers.       Problem: ABCDS Injury Assessment  Goal: Absence of physical injury  2/21/2024 1114 by Sudha Collado RN  Outcome: Progressing  Flowsheets (Taken 2/21/2024 1045)  Absence of Physical Injury: Implement safety measures based on patient assessment  2/21/2024 0045 by Madina Mayorga RN  Outcome: Progressing  Flowsheets (Taken 2/21/2024 0045)  Absence of Physical Injury: Implement safety measures based on patient assessment     Problem: Pain  Goal: Verbalizes/displays adequate
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
## 2024-02-21 NOTE — CARE COORDINATION
Rec'd call back from [facility] who states they will be the facility to provide home care per the [facility].    NICK Milner     Case Management   [phone]    2/21/2024  3:06 PM

## 2024-02-21 NOTE — CARE COORDINATION
[facility]    Referral received from  to follow for home care services.   I will follow for needs, and speak with patient to verify demos.    Andrés Bright RN, BSN CTN  [facility] [phone]

## 2024-02-22 LAB
ANION GAP SERPL CALCULATED.3IONS-SCNC: 1 MMOL/L (ref 3–16)
BASOPHILS # BLD: 0 K/UL (ref 0–0.2)
BASOPHILS NFR BLD: 1 %
BUN SERPL-MCNC: 20 MG/DL (ref 7–20)
CALCIUM SERPL-MCNC: 8.1 MG/DL (ref 8.3–10.6)
CHLORIDE SERPL-SCNC: 106 MMOL/L (ref 99–110)
CO2 SERPL-SCNC: 34 MMOL/L (ref 21–32)
CREAT SERPL-MCNC: 0.7 MG/DL (ref 0.8–1.3)
DEPRECATED RDW RBC AUTO: 18.1 % (ref 12.4–15.4)
EOSINOPHIL # BLD: 0.3 K/UL (ref 0–0.6)
EOSINOPHIL NFR BLD: 5.9 %
GFR SERPLBLD CREATININE-BSD FMLA CKD-EPI: >60 ML/MIN/{1.73_M2}
GLUCOSE SERPL-MCNC: 92 MG/DL (ref 70–99)
HCT VFR BLD AUTO: 26.7 % (ref 40.5–52.5)
HGB BLD-MCNC: 8.8 G/DL (ref 13.5–17.5)
LYMPHOCYTES # BLD: 1.7 K/UL (ref 1–5.1)
LYMPHOCYTES NFR BLD: 35.7 %
MCH RBC QN AUTO: 32.7 PG (ref 26–34)
MCHC RBC AUTO-ENTMCNC: 32.9 G/DL (ref 31–36)
MCV RBC AUTO: 99.3 FL (ref 80–100)
MONOCYTES # BLD: 0.4 K/UL (ref 0–1.3)
MONOCYTES NFR BLD: 7.8 %
NEUTROPHILS # BLD: 2.3 K/UL (ref 1.7–7.7)
NEUTROPHILS NFR BLD: 49.6 %
PLATELET # BLD AUTO: 234 K/UL (ref 135–450)
PMV BLD AUTO: 7.8 FL (ref 5–10.5)
POTASSIUM SERPL-SCNC: 4.1 MMOL/L (ref 3.5–5.1)
RBC # BLD AUTO: 2.69 M/UL (ref 4.2–5.9)
SODIUM SERPL-SCNC: 141 MMOL/L (ref 136–145)
WBC # BLD AUTO: 4.7 K/UL (ref 4–11)

## 2024-02-22 PROCEDURE — 85025 COMPLETE CBC W/AUTO DIFF WBC: CPT

## 2024-02-22 PROCEDURE — 97116 GAIT TRAINING THERAPY: CPT

## 2024-02-22 PROCEDURE — 97530 THERAPEUTIC ACTIVITIES: CPT

## 2024-02-22 PROCEDURE — 6370000000 HC RX 637 (ALT 250 FOR IP): Performed by: PHYSICAL MEDICINE & REHABILITATION

## 2024-02-22 PROCEDURE — 6360000002 HC RX W HCPCS: Performed by: PHYSICAL MEDICINE & REHABILITATION

## 2024-02-22 PROCEDURE — 97535 SELF CARE MNGMENT TRAINING: CPT

## 2024-02-22 PROCEDURE — 97110 THERAPEUTIC EXERCISES: CPT

## 2024-02-22 PROCEDURE — 6370000000 HC RX 637 (ALT 250 FOR IP): Performed by: STUDENT IN AN ORGANIZED HEALTH CARE EDUCATION/TRAINING PROGRAM

## 2024-02-22 PROCEDURE — 51798 US URINE CAPACITY MEASURE: CPT

## 2024-02-22 PROCEDURE — 80048 BASIC METABOLIC PNL TOTAL CA: CPT

## 2024-02-22 PROCEDURE — 51701 INSERT BLADDER CATHETER: CPT

## 2024-02-22 PROCEDURE — 36415 COLL VENOUS BLD VENIPUNCTURE: CPT

## 2024-02-22 PROCEDURE — 1280000000 HC REHAB R&B

## 2024-02-22 PROCEDURE — 97542 WHEELCHAIR MNGMENT TRAINING: CPT

## 2024-02-22 RX ORDER — LANOLIN ALCOHOL/MO/W.PET/CERES
400 CREAM (GRAM) TOPICAL
Qty: 5 TABLET | Refills: 0 | Status: SHIPPED | OUTPATIENT
Start: 2024-02-22

## 2024-02-22 RX ORDER — ENOXAPARIN SODIUM 100 MG/ML
80 INJECTION SUBCUTANEOUS 2 TIMES DAILY
Qty: 8 ML | Refills: 0 | Status: SHIPPED | OUTPATIENT
Start: 2024-02-22 | End: 2024-02-27

## 2024-02-22 RX ORDER — PANTOPRAZOLE SODIUM 40 MG/1
40 TABLET, DELAYED RELEASE ORAL 2 TIMES DAILY
Qty: 10 TABLET | Refills: 0 | Status: SHIPPED | OUTPATIENT
Start: 2024-02-22

## 2024-02-22 RX ORDER — CARVEDILOL 3.12 MG/1
3.12 TABLET ORAL 2 TIMES DAILY WITH MEALS
Qty: 10 TABLET | Refills: 0 | Status: SHIPPED | OUTPATIENT
Start: 2024-02-22

## 2024-02-22 RX ORDER — TAMSULOSIN HYDROCHLORIDE 0.4 MG/1
0.4 CAPSULE ORAL NIGHTLY
Qty: 5 CAPSULE | Refills: 0 | Status: SHIPPED | OUTPATIENT
Start: 2024-02-22

## 2024-02-22 RX ORDER — FOLIC ACID 1 MG/1
1 TABLET ORAL DAILY
Qty: 5 TABLET | Refills: 0 | Status: SHIPPED | OUTPATIENT
Start: 2024-02-22

## 2024-02-22 RX ADMIN — TAMSULOSIN HYDROCHLORIDE 0.4 MG: 0.4 CAPSULE ORAL at 19:57

## 2024-02-22 RX ADMIN — PHENYTOIN SODIUM 100 MG: 100 CAPSULE ORAL at 20:01

## 2024-02-22 RX ADMIN — BACLOFEN 10 MG: 10 TABLET ORAL at 19:57

## 2024-02-22 RX ADMIN — ACETAMINOPHEN 1000 MG: 500 TABLET ORAL at 13:58

## 2024-02-22 RX ADMIN — DICLOFENAC SODIUM 4 G: 10 GEL TOPICAL at 09:56

## 2024-02-22 RX ADMIN — PANTOPRAZOLE SODIUM 40 MG: 40 TABLET, DELAYED RELEASE ORAL at 16:14

## 2024-02-22 RX ADMIN — LAMOTRIGINE 200 MG: 100 TABLET ORAL at 19:57

## 2024-02-22 RX ADMIN — SENNOSIDES AND DOCUSATE SODIUM 1 TABLET: 8.6; 5 TABLET ORAL at 09:39

## 2024-02-22 RX ADMIN — Medication 3 MG: at 19:57

## 2024-02-22 RX ADMIN — BACLOFEN 10 MG: 10 TABLET ORAL at 09:30

## 2024-02-22 RX ADMIN — ENOXAPARIN SODIUM 80 MG: 100 INJECTION SUBCUTANEOUS at 09:59

## 2024-02-22 RX ADMIN — LAMOTRIGINE 200 MG: 100 TABLET ORAL at 09:31

## 2024-02-22 RX ADMIN — PANTOPRAZOLE SODIUM 40 MG: 40 TABLET, DELAYED RELEASE ORAL at 05:49

## 2024-02-22 RX ADMIN — SENNOSIDES AND DOCUSATE SODIUM 1 TABLET: 8.6; 5 TABLET ORAL at 20:01

## 2024-02-22 RX ADMIN — PHENYTOIN SODIUM 100 MG: 100 CAPSULE ORAL at 13:58

## 2024-02-22 RX ADMIN — OXYCODONE HYDROCHLORIDE 10 MG: 10 TABLET, FILM COATED, EXTENDED RELEASE ORAL at 09:29

## 2024-02-22 RX ADMIN — Medication 400 MG: at 09:31

## 2024-02-22 RX ADMIN — OXYCODONE HYDROCHLORIDE 10 MG: 10 TABLET, FILM COATED, EXTENDED RELEASE ORAL at 20:01

## 2024-02-22 RX ADMIN — ACETAMINOPHEN 1000 MG: 500 TABLET ORAL at 23:02

## 2024-02-22 RX ADMIN — CARVEDILOL 3.12 MG: 3.12 TABLET, FILM COATED ORAL at 16:40

## 2024-02-22 RX ADMIN — DICLOFENAC SODIUM 4 G: 10 GEL TOPICAL at 17:52

## 2024-02-22 RX ADMIN — ENOXAPARIN SODIUM 80 MG: 100 INJECTION SUBCUTANEOUS at 20:01

## 2024-02-22 RX ADMIN — CARVEDILOL 3.12 MG: 3.12 TABLET, FILM COATED ORAL at 09:31

## 2024-02-22 RX ADMIN — DICLOFENAC SODIUM 4 G: 10 GEL TOPICAL at 12:48

## 2024-02-22 RX ADMIN — ATORVASTATIN CALCIUM 80 MG: 80 TABLET, FILM COATED ORAL at 20:01

## 2024-02-22 RX ADMIN — BACLOFEN 10 MG: 10 TABLET ORAL at 13:58

## 2024-02-22 RX ADMIN — ACETAMINOPHEN 1000 MG: 500 TABLET ORAL at 05:49

## 2024-02-22 RX ADMIN — FOLIC ACID 1 MG: 1 TABLET ORAL at 09:30

## 2024-02-22 RX ADMIN — PHENYTOIN SODIUM 100 MG: 100 CAPSULE ORAL at 09:38

## 2024-02-22 ASSESSMENT — PAIN SCALES - GENERAL
PAINLEVEL_OUTOF10: 3
PAINLEVEL_OUTOF10: 3
PAINLEVEL_OUTOF10: 0
PAINLEVEL_OUTOF10: 5
PAINLEVEL_OUTOF10: 0
PAINLEVEL_OUTOF10: 5
PAINLEVEL_OUTOF10: 5

## 2024-02-22 ASSESSMENT — PAIN DESCRIPTION - ORIENTATION
ORIENTATION: LOWER
ORIENTATION: LOWER

## 2024-02-22 ASSESSMENT — PAIN DESCRIPTION - PAIN TYPE
TYPE: CHRONIC PAIN

## 2024-02-22 ASSESSMENT — PAIN DESCRIPTION - DESCRIPTORS
DESCRIPTORS: ACHING

## 2024-02-22 ASSESSMENT — PAIN DESCRIPTION - LOCATION
LOCATION: BACK

## 2024-02-22 NOTE — CARE COORDINATION
SOCIAL WORK DISCHARGE SUMMARY        DATE OF DISCHARGE:      Friday, 2-      LOCATION:    Home    Discharging to Facility/ Agency   Name: [facility]  Address:  [address] [place] [address] [address], [place] [address]   Phone:  [phone]  Fax:  [phone]       TIME:   11 - 12 noon        PHARMACY:  [facility] for scripts for five day coverage.        DME:      none ordered.      NICK Milner     Case Management   [phone]    2/22/2024  3:02 PM

## 2024-02-22 NOTE — PROGRESS NOTES
Physical Therapy  Facility/Department: [address] REHAB  Rehabilitation Physical Therapy Treatment Note    NAME: Eric Ovalles  : 1945 (78 y.o.)  MRN: [id number]  CODE STATUS: Full Code    Date of Service: 24       Restrictions:  Restrictions/Precautions: Aspiration Risk, Fall Risk  Position Activity Restriction  Other position/activity restrictions: intermittant catheterization as PTA     SUBJECTIVE  Subjective  Pain: none     OBJECTIVE  Cognition  Overall Cognitive Status: Exceptions  Attention Span: Appears intact  Safety Judgement: Decreased awareness of need for safety  Problem Solving: Assistance required to implement solutions;Assistance required to generate solutions  Insights: Decreased awareness of deficits  Orientation  Overall Orientation Status: Within Functional Limits    Functional Mobility  Bed Mobility  Overall Assistance Level: Modified Independent  Bridging  Assistance Level: Modified independent  Skilled Clinical Factors: very small lift, able to use L LE only, able to scoot in toward the middle of the bed  Roll Left  Assistance Level: Modified independent  Roll Right  Assistance Level: Modified independent  Sit to Supine  Assistance Level: Modified independent  Supine to Sit  Assistance Level: Modified independent  Skilled Clinical Factors: with effort; slides BLE off and uses momentum at trunk  Scooting  Assistance Level: Modified independent  Balance  Sitting Balance: Modified independent   Standing Balance: Stand by assistance  Transfers  Surface: Wheelchair;To mat;From mat  Additional Factors: Increased time to complete;Set-up  Device: Cane (LBQC)  Sit to Stand  Assistance Level: Supervision  Skilled Clinical Factors: sometmes requires repetition and increasing forward lean  Stand to Sit  Assistance Level: Supervision  Skilled Clinical Factors: Good hand placement and technique  Bed To/From Chair  Technique: Stand step  Assistance Level: Stand by assist  Stand

## 2024-02-22 NOTE — PLAN OF CARE
Problem: Discharge Planning  Goal: Discharge to home or other facility with appropriate resources  Outcome: Progressing  Flowsheets (Taken 2/22/2024 0200)  Discharge to home or other facility with appropriate resources:   Identify barriers to discharge with patient and caregiver   Identify discharge learning needs (meds, wound care, etc)     Problem: Safety - Adult  Goal: Free from fall injury  Outcome: Progressing  Note: Fall risk band on patient. Orange light on outside of room. Non skid footwear in place. Alarms used appropriately. Patient instructed to call and wait for staff before getting up. Rounding done to anticipate needs. Appropriate safety devices used for transfers.       Problem: ABCDS Injury Assessment  Goal: Absence of physical injury  Outcome: Progressing  Flowsheets (Taken 2/22/2024 0200)  Absence of Physical Injury: Implement safety measures based on patient assessment     Problem: Pain  Goal: Verbalizes/displays adequate comfort level or baseline comfort level  Outcome: Progressing  Flowsheets (Taken 2/22/2024 0200)  Verbalizes/displays adequate comfort level or baseline comfort level:   Encourage patient to monitor pain and request assistance   Assess pain using appropriate pain scale   Administer analgesics based on type and severity of pain and evaluate response   Implement non-pharmacological measures as appropriate and evaluate response     Problem: Skin/Tissue Integrity  Goal: Absence of new skin breakdown  Description: 1.  Monitor for areas of redness and/or skin breakdown  2.  Assess vascular access sites hourly  3.  Every 4-6 hours minimum:  Change oxygen saturation probe site  4.  Every 4-6 hours:  If on nasal continuous positive airway pressure, respiratory therapy assess nares and determine need for appliance change or resting period.  Outcome: Progressing  Note: Skin assessment completed on admission and every shift. Barrier wipes used in the event of incontinence. Pressure relief  techniques used as needed while in chair and in bed. Position changes encouraged at least every two hours while in bed.        Problem: Musculoskeletal - Adult  Goal: Return mobility to safest level of function  Outcome: Progressing  Flowsheets (Taken 2/22/2024 0200)  Return Mobility to Safest Level of Function:   Assess patient stability and activity tolerance for standing, transferring and ambulating with or without assistive devices   Assist with transfers and ambulation using safe patient handling equipment as needed     Problem: Musculoskeletal - Adult  Goal: Maintain proper alignment of affected body part  Outcome: Progressing  Flowsheets (Taken 2/22/2024 0200)  Maintain proper alignment of affected body part:   Support and protect limb and body alignment per provider's orders   Instruct and reinforce with patient and family use of appropriate assistive device and precautions (e.g. spinal or hip dislocation precautions)     Problem: Musculoskeletal - Adult  Goal: Return ADL status to a safe level of function  Outcome: Progressing  Flowsheets (Taken 2/22/2024 0200)  Return ADL Status to a Safe Level of Function:   Administer medication as ordered   Assess activities of daily living deficits and provide assistive devices as needed

## 2024-02-22 NOTE — PROGRESS NOTES
Patient admitted to rehab with SAH, falls.  A/Ox4. Transfers stand pivot with or without cane, may need several attempts. Mobility restrictions: cues for right arm neglect. On regular diet, tolerating well. Medications taken whole. On Lovenox for DVT prophylaxis.  Skin: monitor. Straight cath done, wife does at home. [facility] 02/22/2024. Chair/bed alarms in use and call light in reach. Will monitor for safety.

## 2024-02-22 NOTE — CARE COORDINATION
Email sent to [facility] office, to Lary Acevedo to inquire when the medications will be delivered to the home.  Informed her he will need his second dose of lovenox on Friday evening.  NICK Milner     Case Management   [phone]    2/22/2024  11:39 AM

## 2024-02-22 NOTE — PLAN OF CARE
Problem: Musculoskeletal - Adult  Goal: Maintain proper alignment of affected body part  2/22/2024 1300 by Sudha Collado RN  Outcome: Progressing  Flowsheets (Taken 2/22/2024 0940)  Maintain proper alignment of affected body part: Support and protect limb and body alignment per provider's orders  Note: Cues for right hand neglect.  2/22/2024 0200 by Madina Mayorga RN  Outcome: Progressing  Flowsheets (Taken 2/22/2024 0200)  Maintain proper alignment of affected body part:   Support and protect limb and body alignment per provider's orders   Instruct and reinforce with patient and family use of appropriate assistive device and precautions (e.g. spinal or hip dislocation precautions)     Problem: Discharge Planning  Goal: Discharge to home or other facility with appropriate resources  2/22/2024 1300 by Sudha Collado RN  Outcome: Progressing  Flowsheets  Taken 2/22/2024 1300  Discharge to home or other facility with appropriate resources:   Identify barriers to discharge with patient and caregiver   Identify discharge learning needs (meds, wound care, etc)  Taken 2/22/2024 0940  Discharge to home or other facility with appropriate resources: Identify barriers to discharge with patient and caregiver  Note: Wife here and verbalizes understanding.  2/22/2024 0200 by Madina Mayorga RN  Outcome: Progressing  Flowsheets (Taken 2/22/2024 0200)  Discharge to home or other facility with appropriate resources:   Identify barriers to discharge with patient and caregiver   Identify discharge learning needs (meds, wound care, etc)     Problem: Safety - Adult  Goal: Free from fall injury  2/22/2024 1300 by Sudha Collado RN  Outcome: Progressing  Flowsheets (Taken 2/22/2024 0952)  Free From Fall Injury: Instruct family/caregiver on patient safety  2/22/2024 0200 by Madina Mayorga RN  Outcome: Progressing  Note: Fall risk band on patient. Orange light on outside of room. Non skid footwear in
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
## 2024-02-22 NOTE — PROGRESS NOTES
Patient admitted to rehab with subarachnoid hemorrhage.  A/Ox4. Transfers with stand pivot with cane. Mobility restrictions: right side flaccid, AFO for right foot. On regular diet, tolerating well. Medications taken whole with thins. On lovenox, triston hose for DVT prophylaxis.  Skin: buttocks red but blanchable. Oxygen: RA. LDA: none. Has been continent of bowel and SC q 8 hr for bladder. Last straight cath performed at approx 21:00 for 350 mL. LBM 2/21/24.  No concerns voiced this shift. Pt resting quietly in bed at this time. Respirations easy and even. Bed alarm on and call light in reach.

## 2024-02-22 NOTE — CARE COORDINATION
Met with patient, wife and adult son to review DC for tomorrow.  Son will come to hospital around 11 - 12 noon and assist his mother in getting him home.  Wife will have scripts from [email or web address].  No DME is ordered through [facility].  [facility] is following and stated they will see him on Monday.  Imm and  letters presented.  NICK Milner     Case Management   [phone]    2/22/2024  3:08 PM
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
## 2024-02-22 NOTE — PROGRESS NOTES
Wife not here this am for Lovenox injection, did review, wife gives B vitamin shots at home, she also has handouts about Lovenox. States will be here by 10 am and can give injection 02/23/2024.

## 2024-02-22 NOTE — CARE COORDINATION
Wife informs me that the [facility] were [facility] to [facility] but we don't take her insurance and the out of pocket cost is $180.  Suggested we find out if they would have at her pharmacy.  She asked that I Call [facility] in [place].  Call placed; they don't have in stock.  Called to [facility] on [facility] and they state [facility] does not stock this med and would take 1 - 2 days to get it in.  Call placed back to wife who states they will pay the $180.  NICK Milner     Case Management   [phone]    2/22/2024  3:07 PM

## 2024-02-23 VITALS
BODY MASS INDEX: 24.66 KG/M2 | OXYGEN SATURATION: 98 % | HEART RATE: 79 BPM | RESPIRATION RATE: 17 BRPM | WEIGHT: 186.07 LBS | SYSTOLIC BLOOD PRESSURE: 158 MMHG | DIASTOLIC BLOOD PRESSURE: 76 MMHG | HEIGHT: 73 IN | TEMPERATURE: 98 F

## 2024-02-23 PROCEDURE — 6360000002 HC RX W HCPCS: Performed by: PHYSICAL MEDICINE & REHABILITATION

## 2024-02-23 PROCEDURE — 94760 N-INVAS EAR/PLS OXIMETRY 1: CPT

## 2024-02-23 PROCEDURE — 6370000000 HC RX 637 (ALT 250 FOR IP): Performed by: STUDENT IN AN ORGANIZED HEALTH CARE EDUCATION/TRAINING PROGRAM

## 2024-02-23 PROCEDURE — 6370000000 HC RX 637 (ALT 250 FOR IP): Performed by: PHYSICAL MEDICINE & REHABILITATION

## 2024-02-23 PROCEDURE — 51798 US URINE CAPACITY MEASURE: CPT

## 2024-02-23 PROCEDURE — 51701 INSERT BLADDER CATHETER: CPT

## 2024-02-23 RX ADMIN — CARVEDILOL 3.12 MG: 3.12 TABLET, FILM COATED ORAL at 08:12

## 2024-02-23 RX ADMIN — SENNOSIDES AND DOCUSATE SODIUM 1 TABLET: 8.6; 5 TABLET ORAL at 08:13

## 2024-02-23 RX ADMIN — LAMOTRIGINE 200 MG: 100 TABLET ORAL at 08:12

## 2024-02-23 RX ADMIN — PANTOPRAZOLE SODIUM 40 MG: 40 TABLET, DELAYED RELEASE ORAL at 05:33

## 2024-02-23 RX ADMIN — FOLIC ACID 1 MG: 1 TABLET ORAL at 08:13

## 2024-02-23 RX ADMIN — PHENYTOIN SODIUM 100 MG: 100 CAPSULE ORAL at 08:12

## 2024-02-23 RX ADMIN — ENOXAPARIN SODIUM 80 MG: 100 INJECTION SUBCUTANEOUS at 09:32

## 2024-02-23 RX ADMIN — OXYCODONE HYDROCHLORIDE 10 MG: 10 TABLET, FILM COATED, EXTENDED RELEASE ORAL at 08:12

## 2024-02-23 RX ADMIN — ACETAMINOPHEN 1000 MG: 500 TABLET ORAL at 05:33

## 2024-02-23 RX ADMIN — Medication 400 MG: at 08:13

## 2024-02-23 RX ADMIN — BACLOFEN 10 MG: 10 TABLET ORAL at 08:13

## 2024-02-23 ASSESSMENT — PAIN SCALES - GENERAL: PAINLEVEL_OUTOF10: 0

## 2024-02-23 NOTE — PROGRESS NOTES
Pt's wife at bedside. Lovenox injection administered by pt's wife with this RN's assistance. Teach back & demonstration education performed. Pt's wife states that she feels comfortable giving the injection.
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
## 2024-02-23 NOTE — PROGRESS NOTES
Patient admitted to rehab with SAH s/p mechanical fall.  A/Ox4. Transfers with stand pivot with cane. Mobility restrictions: R side flaccid, AFO for right foot. On Regular diet, tolerating well. Medications taken whole with thin liquids. On Lovenox for DVT prophylaxis.  Skin: pink coccyx. Oxygen: RA. LDA: None. Has been continent of bowel and SC q8H for bladder. LBM 2/21. Chair/bed alarms in use and call light in reach.

## 2024-02-23 NOTE — PROGRESS NOTES
Called retail pharmacy regarding pt's new prescriptions. [facility] states they will bring the medications up with the 10am delivery.

## 2024-02-23 NOTE — PROGRESS NOTES
Pt with active discharge order. RN went over discharge instructions with patient, pt and pt's family states understanding. Pt with no questions or concerns voiced to RN at this time.   Awaiting pt's son to come assist with discharge / transfer home. Juliana, pt's spouse, states he will be here around noon. Prescriptions delivered to bedside.

## 2024-02-23 NOTE — PROGRESS NOTES
CLINICAL PHARMACY NOTE: MEDS TO BEDS    Total # of Prescriptions Filled: 5   The following medications were delivered to the patient:  Current Discharge Medication List        START taking these medications    Details   folic acid (FOLVITE) 1 MG tablet Take 1 tablet by mouth daily  Qty: 5 tablet, Refills: 0      magnesium oxide (MAG-OX) 400 (240 Mg) MG tablet Take 1 tablet by mouth daily (with breakfast)  Qty: 5 tablet, Refills: 0           Enoxaparin   Carvedilol  Tamsulosin     Additional Documentation:

## 2024-02-23 NOTE — PROGRESS NOTES
Provision of Current Reconciled Medication List to Subsequent Provider at Discharge  [x]No, current reconciled medication list not provided to the subsequent provider.  []Yes, current reconciled medication list provided to the subsequent provider. (**Select route of transmission below**)   [] Via Electronic Health Record   []Via Health Information Exchange Organization  [] Verbal (e.g. in person, telephone, video conferencing)  []Paper-based (e.g. fax, copies, printouts)   []Other Methods (e.g. texting, email, CDs)    Provision of Current Reconciled Medication List to Patient at Discharge  []No, current reconciled medication list not provided to the patient, family and/or caregiver.   []Yes, current reconciled medication list provided to the patient, family and/or caregiver.  (**Select route of transmission below**)   [] Via Electronic Health Record (e.g., electronic access to patient portal)   [] Via Health Information Exchange Organization  [] Verbal (e.g. in person, telephone, video conferencing)  [x]Paper-based (e.g. fax, copies, printouts)   []Other Methods (e.g. texting, email, CDs)    High Risk Drug Classes:  Use and Indication    Is taking: Check if the pt is taking any medications by pharmacological classification, not how it is used, in the following classes  Indication noted: If column 1 is checked, check if there is an indication noted for all meds in the drug class Is taking  (check all that apply) Indication noted (check all that apply)   Antipsychotic [] []   Anticoagulant [x] [x]   Antibiotic [] []   Opioid [x] [x]   Antiplatelet [] []   Hypoglycemic (including insulin) [] []   None of the above []     Special Treatments, Procedures, and Programs    Check all of the following treatments, procedures, and programs that apply at discharge. At Discharge (check all that apply)   Cancer Treatments   A1. Chemotherapy []           A2. IV []           A3. Oral []           A10. Other []   B1. Radiation []

## 2024-02-23 NOTE — PROGRESS NOTES
Pt A&Ox 4 on RA. AM assessment and vitals completed and put into flowsheets. AM medications given with no s/s of aspiration. Pt with no questions or concerns voiced to RN at this time. Fall precautions in place and call light within reach.   Awaiting wife to come to bedside to educate on lovenox shot prior to discharge. Pt agreeable with care plan.

## 2024-02-23 NOTE — PLAN OF CARE
Problem: Discharge Planning  Goal: Discharge to home or other facility with appropriate resources  2/23/2024 0855 by Esther Malhotra RN  Outcome: Progressing  2/23/2024 0326 by Shalini Mae RN  Outcome: Progressing  Flowsheets (Taken 2/22/2024 1945)  Discharge to home or other facility with appropriate resources: Identify barriers to discharge with patient and caregiver     Problem: Safety - Adult  Goal: Free from fall injury  2/23/2024 0855 by Esther Malhotra RN  Outcome: Progressing  2/23/2024 0326 by Shalini Mae RN  Outcome: Progressing  Note: Fall risk band on patient. Orange light on outside of room. Non skid footwear in place. Alarms used appropriately. Patient instructed to call and wait for staff before getting up. Rounding done to anticipate needs. Appropriate safety devices used for transfers.     Problem: ABCDS Injury Assessment  Goal: Absence of physical injury  2/23/2024 0855 by Esther Malhotra RN  Outcome: Progressing  2/23/2024 0326 by Shalini Mae RN  Outcome: Progressing     Problem: Pain  Goal: Verbalizes/displays adequate comfort level or baseline comfort level  2/23/2024 0855 by Esther Malhotra RN  Outcome: Progressing  2/23/2024 0326 by Shalini Mae RN  Outcome: Progressing     Problem: Nutrition Deficit:  Goal: Optimize nutritional status  2/23/2024 0855 by Esther Malhotra RN  Outcome: Progressing  2/23/2024 0326 by Shalini Mae RN  Outcome: Progressing     Problem: Skin/Tissue Integrity  Goal: Absence of new skin breakdown  Description: 1.  Monitor for areas of redness and/or skin breakdown  2.  Assess vascular access sites hourly  3.  Every 4-6 hours minimum:  Change oxygen saturation probe site  4.  Every 4-6 hours:  If on nasal continuous positive airway pressure, respiratory therapy assess nares and determine need for appliance change or resting period.  2/23/2024 0855 by Esther Malhotra RN  Outcome: Progressing  2/23/2024 0326 by Shalini Mae RN  Outcome: Progressing     Problem: Musculoskeletal -  Adult  Goal: Return mobility to safest level of function  2/23/2024 0855 by Esther Malhotra RN  Outcome: Progressing  2/23/2024 0326 by Shalini Mae RN  Outcome: Progressing  Goal: Maintain proper alignment of affected body part  2/23/2024 0855 by Esther Malhotra RN  Outcome: Progressing  2/23/2024 0326 by Shalini Mae RN  Outcome: Progressing  Goal: Return ADL status to a safe level of function  2/23/2024 0855 by Esther Malhotra RN  Outcome: Progressing  2/23/2024 0326 by Shalini Mae RN  Outcome: Progressing     Problem: Genitourinary - Adult  Goal: Absence of urinary retention  2/23/2024 0855 by Esther Malhotra RN  Outcome: Progressing  2/23/2024 0326 by Shalini Mae RN  Outcome: Progressing

## 2024-11-02 ENCOUNTER — APPOINTMENT (OUTPATIENT)
Dept: GENERAL RADIOLOGY | Age: 79
DRG: 291 | End: 2024-11-02
Payer: MEDICARE

## 2024-11-02 ENCOUNTER — HOSPITAL ENCOUNTER (INPATIENT)
Age: 79
LOS: 3 days | Discharge: HOME HEALTH | DRG: 291 | End: 2024-11-05
Attending: EMERGENCY MEDICINE | Admitting: HOSPITALIST
Payer: MEDICARE

## 2024-11-02 ENCOUNTER — APPOINTMENT (OUTPATIENT)
Dept: CT IMAGING | Age: 79
DRG: 291 | End: 2024-11-02
Payer: MEDICARE

## 2024-11-02 DIAGNOSIS — J44.1 COPD EXACERBATION (HCC): ICD-10-CM

## 2024-11-02 DIAGNOSIS — J90 BILATERAL PLEURAL EFFUSION: ICD-10-CM

## 2024-11-02 DIAGNOSIS — I50.21 ACUTE SYSTOLIC CONGESTIVE HEART FAILURE (HCC): ICD-10-CM

## 2024-11-02 DIAGNOSIS — J96.02 ACUTE RESPIRATORY FAILURE WITH HYPOXIA AND HYPERCAPNIA: Primary | ICD-10-CM

## 2024-11-02 DIAGNOSIS — J96.01 ACUTE RESPIRATORY FAILURE WITH HYPOXIA AND HYPERCAPNIA: Primary | ICD-10-CM

## 2024-11-02 PROBLEM — J96.00 ACUTE RESPIRATORY FAILURE: Status: ACTIVE | Noted: 2024-11-02

## 2024-11-02 LAB
ALBUMIN SERPL-MCNC: 3.6 G/DL (ref 3.4–5)
ALBUMIN/GLOB SERPL: 1.1 {RATIO} (ref 1.1–2.2)
ALP SERPL-CCNC: 173 U/L (ref 40–129)
ALT SERPL-CCNC: 13 U/L (ref 10–40)
ANION GAP SERPL CALCULATED.3IONS-SCNC: 11 MMOL/L (ref 3–16)
AST SERPL-CCNC: 15 U/L (ref 15–37)
BASE EXCESS BLDV CALC-SCNC: 0.6 MMOL/L
BASE EXCESS BLDV CALC-SCNC: 1.1 MMOL/L
BASE EXCESS BLDV CALC-SCNC: 3.2 MMOL/L
BASOPHILS # BLD: 0.1 K/UL (ref 0–0.2)
BASOPHILS NFR BLD: 0.9 %
BILIRUB SERPL-MCNC: 0.3 MG/DL (ref 0–1)
BUN SERPL-MCNC: 22 MG/DL (ref 7–20)
CALCIUM SERPL-MCNC: 9.1 MG/DL (ref 8.3–10.6)
CHLORIDE SERPL-SCNC: 103 MMOL/L (ref 99–110)
CO2 BLDV-SCNC: 28 MMOL/L
CO2 BLDV-SCNC: 31 MMOL/L
CO2 BLDV-SCNC: 32 MMOL/L
CO2 SERPL-SCNC: 26 MMOL/L (ref 21–32)
COHGB MFR BLDV: 2.2 %
COHGB MFR BLDV: 2.3 %
COHGB MFR BLDV: 3.3 %
CREAT SERPL-MCNC: 0.9 MG/DL (ref 0.8–1.3)
D-DIMER QUANTITATIVE: 2.36 UG/ML FEU (ref 0–0.6)
DEPRECATED RDW RBC AUTO: 14.4 % (ref 12.4–15.4)
EOSINOPHIL # BLD: 0.2 K/UL (ref 0–0.6)
EOSINOPHIL NFR BLD: 1.6 %
FLUAV RNA UPPER RESP QL NAA+PROBE: NEGATIVE
FLUBV AG NPH QL: NEGATIVE
GFR SERPLBLD CREATININE-BSD FMLA CKD-EPI: 88 ML/MIN/{1.73_M2}
GLUCOSE SERPL-MCNC: 263 MG/DL (ref 70–99)
HCO3 BLDV-SCNC: 27 MMOL/L (ref 23–29)
HCO3 BLDV-SCNC: 29 MMOL/L (ref 23–29)
HCO3 BLDV-SCNC: 30 MMOL/L (ref 23–29)
HCT VFR BLD AUTO: 36 % (ref 40.5–52.5)
HGB BLD-MCNC: 12.2 G/DL (ref 13.5–17.5)
LACTATE BLDV-SCNC: 1 MMOL/L (ref 0.4–1.9)
LACTATE BLDV-SCNC: 1.7 MMOL/L (ref 0.4–1.9)
LYMPHOCYTES # BLD: 2.6 K/UL (ref 1–5.1)
LYMPHOCYTES NFR BLD: 26.3 %
MCH RBC QN AUTO: 32.1 PG (ref 26–34)
MCHC RBC AUTO-ENTMCNC: 33.8 G/DL (ref 31–36)
MCV RBC AUTO: 95.1 FL (ref 80–100)
METHGB MFR BLDV: 0.5 %
METHGB MFR BLDV: 0.8 %
METHGB MFR BLDV: 0.9 %
MONOCYTES # BLD: 0.5 K/UL (ref 0–1.3)
MONOCYTES NFR BLD: 5.5 %
NEUTROPHILS # BLD: 6.5 K/UL (ref 1.7–7.7)
NEUTROPHILS NFR BLD: 65.7 %
NT-PROBNP SERPL-MCNC: ABNORMAL PG/ML (ref 0–449)
O2 THERAPY: ABNORMAL
PCO2 BLDV: 48.4 MMHG (ref 40–50)
PCO2 BLDV: 55 MMHG (ref 40–50)
PCO2 BLDV: 59.6 MMHG (ref 40–50)
PH BLDV: 7.29 [PH] (ref 7.35–7.45)
PH BLDV: 7.34 [PH] (ref 7.35–7.45)
PH BLDV: 7.35 [PH] (ref 7.35–7.45)
PLATELET # BLD AUTO: 293 K/UL (ref 135–450)
PMV BLD AUTO: 9.3 FL (ref 5–10.5)
PO2 BLDV: 34 MMHG
PO2 BLDV: 48 MMHG
PO2 BLDV: 73 MMHG
POTASSIUM SERPL-SCNC: 4.1 MMOL/L (ref 3.5–5.1)
PROT SERPL-MCNC: 7 G/DL (ref 6.4–8.2)
RBC # BLD AUTO: 3.79 M/UL (ref 4.2–5.9)
SAO2 % BLDV: 57 %
SAO2 % BLDV: 80 %
SAO2 % BLDV: 93 %
SARS-COV-2 RDRP RESP QL NAA+PROBE: NOT DETECTED
SODIUM SERPL-SCNC: 140 MMOL/L (ref 136–145)
TROPONIN, HIGH SENSITIVITY: 34 NG/L (ref 0–22)
TROPONIN, HIGH SENSITIVITY: 34 NG/L (ref 0–22)
WBC # BLD AUTO: 10 K/UL (ref 4–11)

## 2024-11-02 PROCEDURE — 5A09357 ASSISTANCE WITH RESPIRATORY VENTILATION, LESS THAN 24 CONSECUTIVE HOURS, CONTINUOUS POSITIVE AIRWAY PRESSURE: ICD-10-PCS | Performed by: STUDENT IN AN ORGANIZED HEALTH CARE EDUCATION/TRAINING PROGRAM

## 2024-11-02 PROCEDURE — 6370000000 HC RX 637 (ALT 250 FOR IP): Performed by: HOSPITALIST

## 2024-11-02 PROCEDURE — 2700000000 HC OXYGEN THERAPY PER DAY

## 2024-11-02 PROCEDURE — 87635 SARS-COV-2 COVID-19 AMP PRB: CPT

## 2024-11-02 PROCEDURE — 85025 COMPLETE CBC W/AUTO DIFF WBC: CPT

## 2024-11-02 PROCEDURE — 87804 INFLUENZA ASSAY W/OPTIC: CPT

## 2024-11-02 PROCEDURE — 82803 BLOOD GASES ANY COMBINATION: CPT

## 2024-11-02 PROCEDURE — 71045 X-RAY EXAM CHEST 1 VIEW: CPT

## 2024-11-02 PROCEDURE — 99285 EMERGENCY DEPT VISIT HI MDM: CPT

## 2024-11-02 PROCEDURE — 6370000000 HC RX 637 (ALT 250 FOR IP): Performed by: EMERGENCY MEDICINE

## 2024-11-02 PROCEDURE — 94640 AIRWAY INHALATION TREATMENT: CPT

## 2024-11-02 PROCEDURE — 96365 THER/PROPH/DIAG IV INF INIT: CPT

## 2024-11-02 PROCEDURE — 6360000002 HC RX W HCPCS: Performed by: EMERGENCY MEDICINE

## 2024-11-02 PROCEDURE — 84484 ASSAY OF TROPONIN QUANT: CPT

## 2024-11-02 PROCEDURE — 6360000004 HC RX CONTRAST MEDICATION: Performed by: EMERGENCY MEDICINE

## 2024-11-02 PROCEDURE — 94761 N-INVAS EAR/PLS OXIMETRY MLT: CPT

## 2024-11-02 PROCEDURE — 87502 INFLUENZA DNA AMP PROBE: CPT

## 2024-11-02 PROCEDURE — 36415 COLL VENOUS BLD VENIPUNCTURE: CPT

## 2024-11-02 PROCEDURE — 85379 FIBRIN DEGRADATION QUANT: CPT

## 2024-11-02 PROCEDURE — 94660 CPAP INITIATION&MGMT: CPT

## 2024-11-02 PROCEDURE — 93005 ELECTROCARDIOGRAM TRACING: CPT | Performed by: EMERGENCY MEDICINE

## 2024-11-02 PROCEDURE — 2580000003 HC RX 258: Performed by: EMERGENCY MEDICINE

## 2024-11-02 PROCEDURE — 96375 TX/PRO/DX INJ NEW DRUG ADDON: CPT

## 2024-11-02 PROCEDURE — 87641 MR-STAPH DNA AMP PROBE: CPT

## 2024-11-02 PROCEDURE — 6360000002 HC RX W HCPCS: Performed by: HOSPITALIST

## 2024-11-02 PROCEDURE — 2000000000 HC ICU R&B

## 2024-11-02 PROCEDURE — 80053 COMPREHEN METABOLIC PANEL: CPT

## 2024-11-02 PROCEDURE — 71260 CT THORAX DX C+: CPT

## 2024-11-02 PROCEDURE — 2580000003 HC RX 258: Performed by: HOSPITALIST

## 2024-11-02 PROCEDURE — 83880 ASSAY OF NATRIURETIC PEPTIDE: CPT

## 2024-11-02 PROCEDURE — 87040 BLOOD CULTURE FOR BACTERIA: CPT

## 2024-11-02 PROCEDURE — 83605 ASSAY OF LACTIC ACID: CPT

## 2024-11-02 RX ORDER — FOLIC ACID 1 MG/1
1 TABLET ORAL DAILY
Status: DISCONTINUED | OUTPATIENT
Start: 2024-11-02 | End: 2024-11-05 | Stop reason: HOSPADM

## 2024-11-02 RX ORDER — IPRATROPIUM BROMIDE AND ALBUTEROL SULFATE 2.5; .5 MG/3ML; MG/3ML
1 SOLUTION RESPIRATORY (INHALATION)
Status: DISCONTINUED | OUTPATIENT
Start: 2024-11-02 | End: 2024-11-03

## 2024-11-02 RX ORDER — FUROSEMIDE 10 MG/ML
40 INJECTION INTRAMUSCULAR; INTRAVENOUS ONCE
Status: COMPLETED | OUTPATIENT
Start: 2024-11-02 | End: 2024-11-02

## 2024-11-02 RX ORDER — METHYLPREDNISOLONE SODIUM SUCCINATE 40 MG/ML
40 INJECTION INTRAMUSCULAR; INTRAVENOUS EVERY 12 HOURS
Status: DISCONTINUED | OUTPATIENT
Start: 2024-11-02 | End: 2024-11-03

## 2024-11-02 RX ORDER — ENOXAPARIN SODIUM 100 MG/ML
40 INJECTION SUBCUTANEOUS DAILY
Status: DISCONTINUED | OUTPATIENT
Start: 2024-11-03 | End: 2024-11-05 | Stop reason: HOSPADM

## 2024-11-02 RX ORDER — FLUTICASONE PROPIONATE 50 MCG
2 SPRAY, SUSPENSION (ML) NASAL DAILY
Status: DISCONTINUED | OUTPATIENT
Start: 2024-11-02 | End: 2024-11-05 | Stop reason: HOSPADM

## 2024-11-02 RX ORDER — POLYETHYLENE GLYCOL 3350 17 G/17G
17 POWDER, FOR SOLUTION ORAL DAILY PRN
Status: DISCONTINUED | OUTPATIENT
Start: 2024-11-02 | End: 2024-11-05 | Stop reason: HOSPADM

## 2024-11-02 RX ORDER — MAGNESIUM SULFATE IN WATER 40 MG/ML
2000 INJECTION, SOLUTION INTRAVENOUS PRN
Status: DISCONTINUED | OUTPATIENT
Start: 2024-11-02 | End: 2024-11-05 | Stop reason: HOSPADM

## 2024-11-02 RX ORDER — VANCOMYCIN 750 MG/150ML
750 INJECTION, SOLUTION INTRAVENOUS EVERY 12 HOURS
Status: DISCONTINUED | OUTPATIENT
Start: 2024-11-02 | End: 2024-11-03

## 2024-11-02 RX ORDER — ONDANSETRON 2 MG/ML
4 INJECTION INTRAMUSCULAR; INTRAVENOUS EVERY 6 HOURS PRN
Status: DISCONTINUED | OUTPATIENT
Start: 2024-11-02 | End: 2024-11-05 | Stop reason: HOSPADM

## 2024-11-02 RX ORDER — IOPAMIDOL 755 MG/ML
75 INJECTION, SOLUTION INTRAVASCULAR
Status: COMPLETED | OUTPATIENT
Start: 2024-11-02 | End: 2024-11-02

## 2024-11-02 RX ORDER — ATORVASTATIN CALCIUM 80 MG/1
80 TABLET, FILM COATED ORAL DAILY
Status: DISCONTINUED | OUTPATIENT
Start: 2024-11-02 | End: 2024-11-03

## 2024-11-02 RX ORDER — IPRATROPIUM BROMIDE AND ALBUTEROL SULFATE 2.5; .5 MG/3ML; MG/3ML
1 SOLUTION RESPIRATORY (INHALATION) ONCE
Status: COMPLETED | OUTPATIENT
Start: 2024-11-02 | End: 2024-11-02

## 2024-11-02 RX ORDER — SODIUM CHLORIDE 0.9 % (FLUSH) 0.9 %
5-40 SYRINGE (ML) INJECTION EVERY 12 HOURS SCHEDULED
Status: DISCONTINUED | OUTPATIENT
Start: 2024-11-02 | End: 2024-11-05 | Stop reason: HOSPADM

## 2024-11-02 RX ORDER — ONDANSETRON 4 MG/1
4 TABLET, ORALLY DISINTEGRATING ORAL EVERY 8 HOURS PRN
Status: DISCONTINUED | OUTPATIENT
Start: 2024-11-02 | End: 2024-11-05 | Stop reason: HOSPADM

## 2024-11-02 RX ORDER — METHYLPREDNISOLONE SODIUM SUCCINATE 125 MG/2ML
125 INJECTION INTRAMUSCULAR; INTRAVENOUS ONCE
Status: COMPLETED | OUTPATIENT
Start: 2024-11-02 | End: 2024-11-02

## 2024-11-02 RX ORDER — PANTOPRAZOLE SODIUM 40 MG/1
40 TABLET, DELAYED RELEASE ORAL 2 TIMES DAILY
Status: DISCONTINUED | OUTPATIENT
Start: 2024-11-02 | End: 2024-11-05 | Stop reason: HOSPADM

## 2024-11-02 RX ORDER — SODIUM CHLORIDE 0.9 % (FLUSH) 0.9 %
5-40 SYRINGE (ML) INJECTION PRN
Status: DISCONTINUED | OUTPATIENT
Start: 2024-11-02 | End: 2024-11-05 | Stop reason: HOSPADM

## 2024-11-02 RX ORDER — POTASSIUM CHLORIDE 1500 MG/1
40 TABLET, EXTENDED RELEASE ORAL PRN
Status: DISCONTINUED | OUTPATIENT
Start: 2024-11-02 | End: 2024-11-05 | Stop reason: HOSPADM

## 2024-11-02 RX ORDER — LAMOTRIGINE 100 MG/1
200 TABLET ORAL 2 TIMES DAILY
Status: DISCONTINUED | OUTPATIENT
Start: 2024-11-02 | End: 2024-11-05 | Stop reason: HOSPADM

## 2024-11-02 RX ORDER — ACETAMINOPHEN 650 MG/1
650 SUPPOSITORY RECTAL EVERY 6 HOURS PRN
Status: DISCONTINUED | OUTPATIENT
Start: 2024-11-02 | End: 2024-11-05 | Stop reason: HOSPADM

## 2024-11-02 RX ORDER — ACETAMINOPHEN 325 MG/1
650 TABLET ORAL EVERY 6 HOURS PRN
Status: DISCONTINUED | OUTPATIENT
Start: 2024-11-02 | End: 2024-11-05 | Stop reason: HOSPADM

## 2024-11-02 RX ORDER — TAMSULOSIN HYDROCHLORIDE 0.4 MG/1
0.4 CAPSULE ORAL NIGHTLY
Status: DISCONTINUED | OUTPATIENT
Start: 2024-11-02 | End: 2024-11-03

## 2024-11-02 RX ORDER — SODIUM CHLORIDE 9 MG/ML
INJECTION, SOLUTION INTRAVENOUS PRN
Status: DISCONTINUED | OUTPATIENT
Start: 2024-11-02 | End: 2024-11-05 | Stop reason: HOSPADM

## 2024-11-02 RX ORDER — ALBUTEROL SULFATE 0.83 MG/ML
2.5 SOLUTION RESPIRATORY (INHALATION)
Status: DISCONTINUED | OUTPATIENT
Start: 2024-11-02 | End: 2024-11-02

## 2024-11-02 RX ORDER — ALBUTEROL SULFATE 5 MG/ML
5 SOLUTION RESPIRATORY (INHALATION) ONCE
Status: COMPLETED | OUTPATIENT
Start: 2024-11-02 | End: 2024-11-02

## 2024-11-02 RX ADMIN — VANCOMYCIN HYDROCHLORIDE 1000 MG: 1 INJECTION, POWDER, LYOPHILIZED, FOR SOLUTION INTRAVENOUS at 11:03

## 2024-11-02 RX ADMIN — FUROSEMIDE 40 MG: 10 INJECTION, SOLUTION INTRAMUSCULAR; INTRAVENOUS at 10:15

## 2024-11-02 RX ADMIN — SODIUM CHLORIDE, PRESERVATIVE FREE 10 ML: 5 INJECTION INTRAVENOUS at 21:32

## 2024-11-02 RX ADMIN — IPRATROPIUM BROMIDE AND ALBUTEROL SULFATE 1 DOSE: .5; 2.5 SOLUTION RESPIRATORY (INHALATION) at 16:21

## 2024-11-02 RX ADMIN — CEFEPIME 2000 MG: 2 INJECTION, POWDER, FOR SOLUTION INTRAVENOUS at 21:42

## 2024-11-02 RX ADMIN — CEFEPIME 2000 MG: 2 INJECTION, POWDER, FOR SOLUTION INTRAVENOUS at 10:22

## 2024-11-02 RX ADMIN — ALBUTEROL SULFATE 5 MG: 2.5 SOLUTION RESPIRATORY (INHALATION) at 08:34

## 2024-11-02 RX ADMIN — METHYLPREDNISOLONE SODIUM SUCCINATE 125 MG: 125 INJECTION INTRAMUSCULAR; INTRAVENOUS at 08:43

## 2024-11-02 RX ADMIN — IPRATROPIUM BROMIDE AND ALBUTEROL SULFATE 1 DOSE: .5; 2.5 SOLUTION RESPIRATORY (INHALATION) at 08:33

## 2024-11-02 RX ADMIN — METHYLPREDNISOLONE SODIUM SUCCINATE 40 MG: 40 INJECTION INTRAMUSCULAR; INTRAVENOUS at 21:29

## 2024-11-02 RX ADMIN — IOPAMIDOL 75 ML: 755 INJECTION, SOLUTION INTRAVENOUS at 09:25

## 2024-11-02 RX ADMIN — LAMOTRIGINE 200 MG: 100 TABLET ORAL at 21:37

## 2024-11-02 ASSESSMENT — PAIN - FUNCTIONAL ASSESSMENT
PAIN_FUNCTIONAL_ASSESSMENT: 0-10
PAIN_FUNCTIONAL_ASSESSMENT: 0-10

## 2024-11-02 ASSESSMENT — PAIN SCALES - GENERAL
PAINLEVEL_OUTOF10: 0

## 2024-11-02 NOTE — CONSULTS
Clinical Pharmacy Note  Vancomycin Consult    Eric Ovalles is a 79 y.o. male ordered vancomycin for pneumonia; consult received from Dr. Boyd to manage therapy. Also receiving cefepime.    Allergies:  Novocain [procaine]     Temp max:  Temp (24hrs), Av °F (36.7 °C), Min:97.5 °F (36.4 °C), Max:98.5 °F (36.9 °C)      Recent Labs     24  0834   WBC 10.0       Recent Labs     24  0834   BUN 22*   CREATININE 0.9         Intake/Output Summary (Last 24 hours) at 2024 1710  Last data filed at 2024 1547  Gross per 24 hour   Intake 74.78 ml   Output 2075 ml   Net -2000.22 ml       Culture Results:  pending    Ht Readings from Last 1 Encounters:   24 1.854 m (6' 1\")        Wt Readings from Last 1 Encounters:   24 59.6 kg (131 lb 6.3 oz)         Estimated Creatinine Clearance: 58 mL/min (based on SCr of 0.9 mg/dL).    Assessment/Plan:  Day # 1 of vancomycin.  Vancomycin 750 mg IV every 12 hours.    Goal -600  Predicted AUC 24hr-SS - 545      Thank you for the consult.

## 2024-11-02 NOTE — PROGRESS NOTES
4 Eyes Skin Assessment     NAME:  Eric Ovalles  YOB: 1945  MEDICAL RECORD NUMBER:  [id number]    The patient is being assessed for  Transfer to New Unit    I agree that at least one RN has performed a thorough Head to Toe Skin Assessment on the patient. ALL assessment sites listed below have been assessed.      Areas assessed by both nurses:    Head, Face, Ears, Shoulders, Back, Chest, Arms, Elbows, Hands, Sacrum. Buttock, Coccyx, Ischium, Legs. Feet and Heels, and Under Medical Devices         Does the Patient have a Wound? No noted wound(s)       Jreemi Prevention initiated by RN: Yes  Wound Care Orders initiated by RN: No    Pressure Injury (Stage 3,4, Unstageable, DTI, NWPT, and Complex wounds) if present, place Wound referral order by RN under : No    New Ostomies, if present place, Ostomy referral order under : No     Nurse 1 eSignature: Electronically signed by Adrianna Briscoe RN on 11/2/24 at 5:16 PM EDT    **SHARE this note so that the co-signing nurse can place an eSignature**    Nurse 2 eSignature: {Esignature:[id number]}
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
## 2024-11-02 NOTE — PROGRESS NOTES
Report and history received from DAVID Carrasquillo. Patient transferred to room 2108 from ED. Patient, wife, and son/DIL oriented to room, call system and unit routines. CHX bath given and preventative sacral border dressing placed to sacrum. No wounds observed.

## 2024-11-02 NOTE — CONSULTS
Clinical Pharmacy Note  Vancomycin Consult    Pharmacy consult received for one-time dose of vancomycin in the Emergency Department per Dr. Billy.    Ht Readings from Last 1 Encounters:   11/02/24 1.854 m (6' 1\")        Wt Readings from Last 1 Encounters:   11/02/24 66 kg (145 lb 8.1 oz)         Assessment/Plan:  Vancomycin 1000 mg IV x 1 in ED.  If vancomycin is to continue on admission and pharmacy is to manage dosing, please re-consult with admission orders.  Ruby Ramírez [facility],11/2/2024,10:27 AM

## 2024-11-02 NOTE — ED PROVIDER NOTES
[facility] EMERGENCY DEPARTMENT  eMERGENCY dEPARTMENT eNCOUnter      Pt Name: Eric Ovalles  MRN: [id number]  Birthdate 1945  Date of evaluation: 11/2/2024  Provider: ELOISE MCGEE MD    CHIEF COMPLAINT       Chief Complaint   Patient presents with    Shortness of Breath     Pt presents to the ED via [facility] with c/c of SOB that began this morning about an hour ago. Pt at baseline wears no oxygen, EMS found him 84% at home on room air. Pt currently on 6L nasal cannula, oxygen saturation 94%. Pt denies any pain at this time. Endorses recent diagnosis of kidney and bladder cancer, hasn't started treatment yet.         CRITICAL CARE TIME   Total Critical Care time was a minimum of 45 minutes, excluding separately reportable procedures.  There was a high probability of clinically significant/life threatening deterioration in the patient's condition which required my urgent intervention.  Critical care time includes my initial evaluation, ongoing bedside care and reassessment, review of old records, review of laboratory, EKG, chest x-ray, CT scan, consultation with the hospitalist for admission.  No procedure time was included.      HISTORY OF PRESENT ILLNESS  (Location/Symptom, Timing/Onset, Context/Setting, Quality, Duration, Modifying Factors, Severity.)   History From: Patient / EMS  Limitations to history : None    Eric Ovalles is a 79 y.o. male who presents to the emergency department complaining sudden onset shortness of breath about an hour prior to calling EMS.  Patient is a smoker.  He denies history of COPD.  He states he was recently diagnosed with kidney and gallbladder cancer.  He has not started treatment yet.  EMS reported that his oxygen saturation was 84% on room air.  They placed him on oxygen.  They tried to put him on BiPAP but he did not tolerate it.  Patient denies chest pain.  He denies fever or chills.  He denies recent illness.    Additional history

## 2024-11-02 NOTE — ED NOTES
Pt transported to 2108 via stretcher accompanied by RN and paramedic. Cardiac monitoring continued during transport.

## 2024-11-03 LAB
ANION GAP SERPL CALCULATED.3IONS-SCNC: 12 MMOL/L (ref 3–16)
BASOPHILS # BLD: 0.1 K/UL (ref 0–0.2)
BASOPHILS NFR BLD: 0.7 %
BUN SERPL-MCNC: 24 MG/DL (ref 7–20)
CALCIUM SERPL-MCNC: 9.1 MG/DL (ref 8.3–10.6)
CHLORIDE SERPL-SCNC: 103 MMOL/L (ref 99–110)
CO2 SERPL-SCNC: 25 MMOL/L (ref 21–32)
CREAT SERPL-MCNC: 0.8 MG/DL (ref 0.8–1.3)
DEPRECATED RDW RBC AUTO: 14 % (ref 12.4–15.4)
EKG ATRIAL RATE: 108 BPM
EKG DIAGNOSIS: NORMAL
EKG P AXIS: 73 DEGREES
EKG P-R INTERVAL: 142 MS
EKG Q-T INTERVAL: 346 MS
EKG QRS DURATION: 116 MS
EKG QTC CALCULATION (BAZETT): 463 MS
EKG R AXIS: 68 DEGREES
EKG T AXIS: -22 DEGREES
EKG VENTRICULAR RATE: 108 BPM
EOSINOPHIL # BLD: 0 K/UL (ref 0–0.6)
EOSINOPHIL NFR BLD: 0.2 %
GFR SERPLBLD CREATININE-BSD FMLA CKD-EPI: >90 ML/MIN/{1.73_M2}
GLUCOSE SERPL-MCNC: 110 MG/DL (ref 70–99)
HCT VFR BLD AUTO: 33.9 % (ref 40.5–52.5)
HGB BLD-MCNC: 11.6 G/DL (ref 13.5–17.5)
LYMPHOCYTES # BLD: 1.8 K/UL (ref 1–5.1)
LYMPHOCYTES NFR BLD: 18.9 %
MAGNESIUM SERPL-MCNC: 2.13 MG/DL (ref 1.8–2.4)
MCH RBC QN AUTO: 32.3 PG (ref 26–34)
MCHC RBC AUTO-ENTMCNC: 34.4 G/DL (ref 31–36)
MCV RBC AUTO: 94 FL (ref 80–100)
MONOCYTES # BLD: 0.5 K/UL (ref 0–1.3)
MONOCYTES NFR BLD: 5.3 %
MRSA DNA SPEC QL NAA+PROBE: NORMAL
NEUTROPHILS # BLD: 7 K/UL (ref 1.7–7.7)
NEUTROPHILS NFR BLD: 74.9 %
PHOSPHATE SERPL-MCNC: 5 MG/DL (ref 2.5–4.9)
PLATELET # BLD AUTO: 166 K/UL (ref 135–450)
PMV BLD AUTO: 10.5 FL (ref 5–10.5)
POTASSIUM SERPL-SCNC: 4.1 MMOL/L (ref 3.5–5.1)
RBC # BLD AUTO: 3.6 M/UL (ref 4.2–5.9)
SLIDE REVIEW: ABNORMAL
SODIUM SERPL-SCNC: 140 MMOL/L (ref 136–145)
WBC # BLD AUTO: 9.4 K/UL (ref 4–11)

## 2024-11-03 PROCEDURE — 93010 ELECTROCARDIOGRAM REPORT: CPT | Performed by: INTERNAL MEDICINE

## 2024-11-03 PROCEDURE — 2700000000 HC OXYGEN THERAPY PER DAY

## 2024-11-03 PROCEDURE — 80048 BASIC METABOLIC PNL TOTAL CA: CPT

## 2024-11-03 PROCEDURE — 6360000002 HC RX W HCPCS: Performed by: INTERNAL MEDICINE

## 2024-11-03 PROCEDURE — 85025 COMPLETE CBC W/AUTO DIFF WBC: CPT

## 2024-11-03 PROCEDURE — 94761 N-INVAS EAR/PLS OXIMETRY MLT: CPT

## 2024-11-03 PROCEDURE — 2580000003 HC RX 258

## 2024-11-03 PROCEDURE — 6360000002 HC RX W HCPCS: Performed by: NURSE PRACTITIONER

## 2024-11-03 PROCEDURE — 84100 ASSAY OF PHOSPHORUS: CPT

## 2024-11-03 PROCEDURE — 6370000000 HC RX 637 (ALT 250 FOR IP): Performed by: HOSPITALIST

## 2024-11-03 PROCEDURE — 2060000000 HC ICU INTERMEDIATE R&B

## 2024-11-03 PROCEDURE — 2580000003 HC RX 258: Performed by: HOSPITALIST

## 2024-11-03 PROCEDURE — 99291 CRITICAL CARE FIRST HOUR: CPT | Performed by: INTERNAL MEDICINE

## 2024-11-03 PROCEDURE — 83735 ASSAY OF MAGNESIUM: CPT

## 2024-11-03 PROCEDURE — 36415 COLL VENOUS BLD VENIPUNCTURE: CPT

## 2024-11-03 PROCEDURE — APPNB15 APP NON BILLABLE TIME 0-15 MINS: Performed by: NURSE PRACTITIONER

## 2024-11-03 PROCEDURE — 94640 AIRWAY INHALATION TREATMENT: CPT

## 2024-11-03 PROCEDURE — 6360000002 HC RX W HCPCS: Performed by: HOSPITALIST

## 2024-11-03 RX ORDER — METOPROLOL SUCCINATE 50 MG/1
25 TABLET, EXTENDED RELEASE ORAL DAILY
Status: ON HOLD | COMMUNITY
End: 2024-11-05

## 2024-11-03 RX ORDER — PHENYTOIN SODIUM 100 MG/1
100 CAPSULE, EXTENDED RELEASE ORAL 3 TIMES DAILY
COMMUNITY

## 2024-11-03 RX ORDER — METOPROLOL SUCCINATE 25 MG/1
25 TABLET, EXTENDED RELEASE ORAL DAILY
Status: DISCONTINUED | OUTPATIENT
Start: 2024-11-03 | End: 2024-11-04

## 2024-11-03 RX ORDER — ROSUVASTATIN CALCIUM 40 MG/1
40 TABLET, COATED ORAL DAILY
COMMUNITY

## 2024-11-03 RX ORDER — WATER 10 ML/10ML
INJECTION INTRAMUSCULAR; INTRAVENOUS; SUBCUTANEOUS
Status: COMPLETED
Start: 2024-11-03 | End: 2024-11-03

## 2024-11-03 RX ORDER — ROSUVASTATIN CALCIUM 40 MG/1
40 TABLET, COATED ORAL DAILY
Status: DISCONTINUED | OUTPATIENT
Start: 2024-11-04 | End: 2024-11-05 | Stop reason: HOSPADM

## 2024-11-03 RX ORDER — FUROSEMIDE 10 MG/ML
40 INJECTION INTRAMUSCULAR; INTRAVENOUS 2 TIMES DAILY
Status: DISCONTINUED | OUTPATIENT
Start: 2024-11-03 | End: 2024-11-04

## 2024-11-03 RX ORDER — VALSARTAN 40 MG/1
40 TABLET ORAL DAILY
COMMUNITY

## 2024-11-03 RX ORDER — ASPIRIN 81 MG/1
81 TABLET ORAL DAILY
Status: DISCONTINUED | OUTPATIENT
Start: 2024-11-03 | End: 2024-11-05 | Stop reason: HOSPADM

## 2024-11-03 RX ORDER — PHENYTOIN SODIUM 100 MG/1
100 CAPSULE, EXTENDED RELEASE ORAL 3 TIMES DAILY
Status: DISCONTINUED | OUTPATIENT
Start: 2024-11-03 | End: 2024-11-05 | Stop reason: HOSPADM

## 2024-11-03 RX ORDER — EPLERENONE 25 MG/1
12.5 TABLET, FILM COATED ORAL DAILY
Status: ON HOLD | COMMUNITY
End: 2024-11-05

## 2024-11-03 RX ORDER — IPRATROPIUM BROMIDE AND ALBUTEROL SULFATE 2.5; .5 MG/3ML; MG/3ML
1 SOLUTION RESPIRATORY (INHALATION)
Status: DISCONTINUED | OUTPATIENT
Start: 2024-11-04 | End: 2024-11-05 | Stop reason: HOSPADM

## 2024-11-03 RX ORDER — OXYCODONE HYDROCHLORIDE 5 MG/1
5 TABLET ORAL DAILY PRN
Status: DISCONTINUED | OUTPATIENT
Start: 2024-11-03 | End: 2024-11-05 | Stop reason: HOSPADM

## 2024-11-03 RX ORDER — FINASTERIDE 5 MG/1
5 TABLET, FILM COATED ORAL DAILY
Status: DISCONTINUED | OUTPATIENT
Start: 2024-11-03 | End: 2024-11-05 | Stop reason: HOSPADM

## 2024-11-03 RX ORDER — FINASTERIDE 5 MG/1
5 TABLET, FILM COATED ORAL DAILY
COMMUNITY

## 2024-11-03 RX ORDER — ASPIRIN 81 MG/1
81 TABLET ORAL DAILY
COMMUNITY

## 2024-11-03 RX ORDER — PHENYTOIN SODIUM 100 MG/1
300 CAPSULE, EXTENDED RELEASE ORAL DAILY
Status: DISCONTINUED | OUTPATIENT
Start: 2024-11-03 | End: 2024-11-03

## 2024-11-03 RX ORDER — METHYLPREDNISOLONE SODIUM SUCCINATE 40 MG/ML
40 INJECTION INTRAMUSCULAR; INTRAVENOUS DAILY
Status: DISCONTINUED | OUTPATIENT
Start: 2024-11-03 | End: 2024-11-04

## 2024-11-03 RX ORDER — IPRATROPIUM BROMIDE AND ALBUTEROL SULFATE 2.5; .5 MG/3ML; MG/3ML
1 SOLUTION RESPIRATORY (INHALATION) EVERY 4 HOURS PRN
Status: DISCONTINUED | OUTPATIENT
Start: 2024-11-03 | End: 2024-11-05 | Stop reason: HOSPADM

## 2024-11-03 RX ORDER — BACLOFEN 10 MG/1
10 TABLET ORAL 2 TIMES DAILY
Status: DISCONTINUED | OUTPATIENT
Start: 2024-11-03 | End: 2024-11-05 | Stop reason: HOSPADM

## 2024-11-03 RX ADMIN — BACLOFEN 10 MG: 10 TABLET ORAL at 20:30

## 2024-11-03 RX ADMIN — PHENYTOIN SODIUM 100 MG: 100 CAPSULE, EXTENDED RELEASE ORAL at 15:48

## 2024-11-03 RX ADMIN — IPRATROPIUM BROMIDE AND ALBUTEROL SULFATE 1 DOSE: .5; 2.5 SOLUTION RESPIRATORY (INHALATION) at 09:06

## 2024-11-03 RX ADMIN — LAMOTRIGINE 200 MG: 100 TABLET ORAL at 09:10

## 2024-11-03 RX ADMIN — CEFEPIME 2000 MG: 2 INJECTION, POWDER, FOR SOLUTION INTRAVENOUS at 09:16

## 2024-11-03 RX ADMIN — ATORVASTATIN CALCIUM 80 MG: 80 TABLET, FILM COATED ORAL at 09:10

## 2024-11-03 RX ADMIN — SODIUM CHLORIDE, PRESERVATIVE FREE 5 ML: 5 INJECTION INTRAVENOUS at 09:11

## 2024-11-03 RX ADMIN — LAMOTRIGINE 200 MG: 100 TABLET ORAL at 20:30

## 2024-11-03 RX ADMIN — WATER 1 ML: 1 INJECTION INTRAMUSCULAR; INTRAVENOUS; SUBCUTANEOUS at 10:00

## 2024-11-03 RX ADMIN — IPRATROPIUM BROMIDE AND ALBUTEROL SULFATE 1 DOSE: .5; 2.5 SOLUTION RESPIRATORY (INHALATION) at 12:29

## 2024-11-03 RX ADMIN — ENOXAPARIN SODIUM 40 MG: 100 INJECTION SUBCUTANEOUS at 09:10

## 2024-11-03 RX ADMIN — CEFEPIME 2000 MG: 2 INJECTION, POWDER, FOR SOLUTION INTRAVENOUS at 16:49

## 2024-11-03 RX ADMIN — ASPIRIN 81 MG: 81 TABLET, COATED ORAL at 15:29

## 2024-11-03 RX ADMIN — FUROSEMIDE 40 MG: 10 INJECTION, SOLUTION INTRAMUSCULAR; INTRAVENOUS at 18:32

## 2024-11-03 RX ADMIN — METHYLPREDNISOLONE SODIUM SUCCINATE 40 MG: 40 INJECTION INTRAMUSCULAR; INTRAVENOUS at 09:10

## 2024-11-03 RX ADMIN — IPRATROPIUM BROMIDE AND ALBUTEROL SULFATE 1 DOSE: .5; 2.5 SOLUTION RESPIRATORY (INHALATION) at 20:16

## 2024-11-03 RX ADMIN — METOPROLOL SUCCINATE 25 MG: 25 TABLET, EXTENDED RELEASE ORAL at 15:28

## 2024-11-03 RX ADMIN — PHENYTOIN SODIUM 100 MG: 100 CAPSULE, EXTENDED RELEASE ORAL at 20:30

## 2024-11-03 RX ADMIN — FINASTERIDE 5 MG: 5 TABLET, FILM COATED ORAL at 15:29

## 2024-11-03 RX ADMIN — VANCOMYCIN 750 MG: 750 INJECTION, SOLUTION INTRAVENOUS at 00:44

## 2024-11-03 RX ADMIN — FOLIC ACID 1 MG: 1 TABLET ORAL at 09:10

## 2024-11-03 RX ADMIN — PANTOPRAZOLE SODIUM 40 MG: 40 TABLET, DELAYED RELEASE ORAL at 09:10

## 2024-11-03 RX ADMIN — PANTOPRAZOLE SODIUM 40 MG: 40 TABLET, DELAYED RELEASE ORAL at 20:30

## 2024-11-03 RX ADMIN — FUROSEMIDE 40 MG: 10 INJECTION, SOLUTION INTRAMUSCULAR; INTRAVENOUS at 09:10

## 2024-11-03 NOTE — PROGRESS NOTES
V2.0    [facility] Progress Note      Name:  Eric Ovalles /Age/Sex: 1945  (79 y.o. male)   MRN & CSN:  [id number] & [id number] Encounter Date/Time: 11/3/2024 12:23 PM EST   Location:  [id number] PCP: Levar Grider MD     Attending:Leia Boyd MD       Hospital Day: 2    Assessment and Recommendations   Eric Ovalles is a 79 y.o. male with pmh of  who presents with Acute respiratory failure      Plan:       Clinical status improving  Telemetry monitering  Cont IV diuresis  Critical care consulted  Duonebs Q4H  Steroids : IV Solumedrol Q12  IV antibiotics : IV Vancomycin and cefeppime  Moniter O2 sat  Check procal        CT chest s/o pleural effusion      Bronchial wall thickening of the subsegmental airways in the right  lower lobe.  Dependent atelectasis in the lower lobes bilaterally.      Empiric IV antibiotics : IV vancomycin and cefepime  Follow up Blood culture x 2     Continue home meds for chronic conditions     H/o CHF , chronic ,systolic : EF 10 %    Cont lasix     DVT ppx : lovenox     Code status : patient and family wants DNR status     Diet    Took me more than 55 minutes for clinical management and coordination of care      Diet ADULT DIET; Regular; Low Fat/Low Chol/High Fiber/2 gm Na; 1500 ml   DVT Prophylaxis [] Lovenox, []  Heparin, [] SCDs, [] Ambulation,  [] Eliquis, [] Xarelto  [] Coumadin   Code Status DNR-CCA   Disposition From:   Expected Disposition:   Estimated Date of Discharge:   Patient requires continued admission due to    Surrogate Decision Maker/ POA       Personally reviewed Lab Studies and Imaging       Discussed management of the case with pulmonology , cont diuresis     Telemetry reviewed by myself no ST elevation          Drugs that require monitoring for toxicity include steroids and the method of monitoring was blood sugar check      Medical Decision Making:  The following items were considered in medical decision making:  Discussion of  PRN  ondansetron, 4 mg, Q8H PRN   Or  ondansetron, 4 mg, Q6H PRN  polyethylene glycol, 17 g, Daily PRN  acetaminophen, 650 mg, Q6H PRN   Or  acetaminophen, 650 mg, Q6H PRN        Labs and Imaging   CT CHEST PULMONARY EMBOLISM W CONTRAST    Result Date: 11/2/2024  EXAMINATION: CTA OF THE CHEST 11/2/2024 9:20 am TECHNIQUE: CTA of the chest was performed after the administration of intravenous contrast.  Multiplanar reformatted images are provided for review.  MIP images are provided for review. Automated exposure control, iterative reconstruction, and/or weight based adjustment of the mA/kV was utilized to reduce the radiation dose to as low as reasonably achievable. COMPARISON: None. HISTORY: ORDERING SYSTEM PROVIDED HISTORY: sudden onset SOB / recent diagnosis cancer TECHNOLOGIST PROVIDED HISTORY: Reason for exam:->sudden onset SOB / recent diagnosis cancer Additional Contrast?->1 Reason for Exam: sudden onset SOB / recent diagnosis cancer FINDINGS: Pulmonary Arteries: Pulmonary arteries are adequately opacified for evaluation.  No evidence of intraluminal filling defect to suggest pulmonary embolism.  Main pulmonary artery is normal in caliber. Mediastinum: Mild cardiomegaly.  No pericardial effusion.  Coronary artery atherosclerosis.  There is no evidence of hilar or mediastinal adenopathy. Lungs/pleura: Moderate right pleural effusion and small left pleural effusion.  Bronchial wall thickening of the subsegmental airways in the right lower lobe.  Dependent atelectasis in the lower lobes bilaterally.  Moderate centrilobular emphysema.  No pneumothorax.  Central tracheobronchial airways are unremarkable. Upper Abdomen: Heterogeneous mass in the posterior aspect of the right renal cortex, only partially seen on this exam.  Abdominal aorta is atherosclerotic. Soft Tissues/Bones: Sclerotic lesion in the T5 vertebral body.  Although there are some features suggestive of a vertebral body hemangioma, other features are  indeterminate.     1. No evidence of pulmonary embolus. 2. Moderate right pleural effusion and small left pleural effusion with dependent atelectasis of the lower lobes bilaterally. 3. Mild bronchial wall thickening of the subsegmental and segmental airways in the right lower lobe.  Please correlate with clinical symptoms of bronchitis. 4. Moderate centrilobular emphysema. 5. Coronary artery atherosclerosis. 6. Partial visualization of exophytic mass arising from the right renal cortex.  Based on available records, patient was recently diagnosed with renal cancer which may account for this finding. 7. Sclerotic lesion in the T5 vertebral body with some features suggestive of a hemangioma, however with other features that are indeterminate.  No prior imaging study in the system covers this portion of the body for comparison. If patient has prior CTs of the chest elsewhere, report will be addended accordingly if they become available for comparison.     XR CHEST PORTABLE    Result Date: 11/2/2024  EXAMINATION: ONE XRAY VIEW OF THE CHEST 11/2/2024 8:26 am COMPARISON: None. HISTORY: ORDERING SYSTEM PROVIDED HISTORY: sob TECHNOLOGIST PROVIDED HISTORY: Reason for exam:->sob Reason for Exam: sob FINDINGS: The lungs are hyperexpanded with bibasilar consolidation.  Heart size and vascularity are normal.  There are small bilateral pleural effusions.  There is no pneumothorax.     COPD with bibasilar atelectasis versus pneumonia.       CBC:   Recent Labs     11/02/24  0834 11/03/24  0509   WBC 10.0 9.4   HGB 12.2* 11.6*    166     BMP:    Recent Labs     11/02/24  0834 11/03/24  0509    140   K 4.1 4.1    103   CO2 26 25   BUN 22* 24*   CREATININE 0.9 0.8   GLUCOSE 263* 110*     Hepatic:   Recent Labs     11/02/24  0834   AST 15   ALT 13   BILITOT 0.3   ALKPHOS 173*     Lipids: No results found for: \"CHOL\", \"HDL\", \"TRIG\"  Hemoglobin A1C: No results found for: \"LABA1C\"  TSH: No results found for:

## 2024-11-03 NOTE — PROGRESS NOTES
Spiritual Health History and Assessment/Progress Note  [facility]    Spiritual/Emotional Needs, Rituals, Rites and Sacraments,  ,  ,      Name: Eric Ovalles MRN: [id number]    Age: 79 y.o.     Sex: male   Language: English   Zoroastrianism: Methodist   Acute respiratory failure     Date: 11/3/2024            Total Time Calculated: 10 min              Spiritual Assessment began in [facility] ICU        Referral/Consult From: Nurse   Encounter Overview/Reason: Spiritual/Emotional Needs, Rituals, Rites and Sacraments  Service Provided For: Patient    Lexii, Belief, Meaning:   Patient is connected with a lexii tradition or spiritual practice  Family/Friends No family/friends present      Importance and Influence:  Patient has spiritual/personal beliefs that influence decisions regarding their health  Family/Friends No family/friends present    Community:  Patient feels well-supported. Support system includes: Spouse/Partner, Children, and Lexii Community  Family/Friends No family/friends present    Assessment and Plan of Care:     Patient Interventions include: Facilitated expression of thoughts and feelings and Provided sacramental/Yarsanism ritual  Family/Friends Interventions include: No family/friends present    Patient Plan of Care: Spiritual Care available upon further referral  Family/Friends Plan of Care: No family/friends present    Electronically signed by CLAY Hammond on 11/3/2024 at 11:45 AM

## 2024-11-03 NOTE — RT PROTOCOL NOTE
RT Inhaler-Nebulizer Bronchodilator Protocol Note    There is a bronchodilator order in the chart from a provider indicating to follow the RT Bronchodilator Protocol and there is an “Initiate RT Inhaler-Nebulizer Bronchodilator Protocol” order as well (see protocol at bottom of note).    CXR Findings:  XR CHEST PORTABLE    Result Date: 11/2/2024  COPD with bibasilar atelectasis versus pneumonia.       The findings from the last RT Protocol Assessment were as follows:   History Pulmonary Disease: Chronic pulmonary disease  Respiratory Pattern: Regular pattern and RR 12-20 bpm  Breath Sounds: Slightly diminished and/or crackles  Cough: Strong, spontaneous, non-productive  Indication for Bronchodilator Therapy: Decreased or absent breath sounds  Bronchodilator Assessment Score: 4    Aerosolized bronchodilator medication orders have been revised according to the RT Inhaler-Nebulizer Bronchodilator Protocol below.    Respiratory Therapist to perform RT Therapy Protocol Assessment initially then follow the protocol.  Repeat RT Therapy Protocol Assessment PRN for score 0-3 or on second treatment, BID, and PRN for scores above 3.    No Indications - adjust the frequency to every 6 hours PRN wheezing or bronchospasm, if no treatments needed after 48 hours then discontinue using Per Protocol order mode.     If indication present, adjust the RT bronchodilator orders based on the Bronchodilator Assessment Score as indicated below.  Use Inhaler orders unless patient has one or more of the following: on home nebulizer, not able to hold breath for 10 seconds, is not alert and oriented, cannot activate and use MDI correctly, or respiratory rate 25 breaths per minute or more, then use the equivalent nebulizer order(s) with same Frequency and PRN reasons based on the score.  If a patient is on this medication at home then do not decrease Frequency below that used at home.    0-3 - enter or revise RT bronchodilator order(s) to

## 2024-11-03 NOTE — PROGRESS NOTES
Pharmacy Medication Reconciliation Note     List of medications patient is currently taking is complete.    Source of information:   1. Conversation with patient and patient's wife at bedside  2. [facility]    Notes regarding home medications:   Multiple changes made to home med list:  Removed oxycodone ER, tamsulosin, carvedilol, and atorvastatin  Added metoprolol, aspirin, rosuvastatin, finasteride, valsartan, eplerenone, and Jardiance  Updated lamotrigine and baclofen     Patient recently prescribed eplerenone and Jardiance - hasn't started taking PTA      Tova Nicolas PharmD  11/3/2024 1:15 PM

## 2024-11-03 NOTE — PROGRESS NOTES
Home medication list not updated in Epic.  Pharmacy called and has been working on updating medication list with family, patient and [facility].  Will call pharmacy when wife is at bedside.
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
## 2024-11-03 NOTE — PLAN OF CARE
Problem: Skin/Tissue Integrity  Goal: Absence of new skin breakdown  Description: 1.  Monitor for areas of redness and/or skin breakdown  2.  Assess vascular access sites hourly  3.  Every 4-6 hours minimum:  Change oxygen saturation probe site  4.  Every 4-6 hours:  If on nasal continuous positive airway pressure, respiratory therapy assess nares and determine need for appliance change or resting period.  Outcome: Progressing     Problem: Chronic Conditions and Co-morbidities  Goal: Patient's chronic conditions and co-morbidity symptoms are monitored and maintained or improved  Outcome: Progressing  Flowsheets (Taken 11/3/2024 0830)  Care Plan - Patient's Chronic Conditions and Co-Morbidity Symptoms are Monitored and Maintained or Improved:   Monitor and assess patient's chronic conditions and comorbid symptoms for stability, deterioration, or improvement   Collaborate with multidisciplinary team to address chronic and comorbid conditions and prevent exacerbation or deterioration   Update acute care plan with appropriate goals if chronic or comorbid symptoms are exacerbated and prevent overall improvement and discharge     Problem: Discharge Planning  Goal: Discharge to home or other facility with appropriate resources  Outcome: Progressing  Flowsheets (Taken 11/3/2024 0830)  Discharge to home or other facility with appropriate resources:   Identify barriers to discharge with patient and caregiver   Arrange for needed discharge resources and transportation as appropriate     Problem: Safety - Adult  Goal: Free from fall injury  Outcome: Progressing  Flowsheets (Taken 11/3/2024 1132)  Free From Fall Injury:   Based on caregiver fall risk screen, instruct family/caregiver to ask for assistance with transferring infant if caregiver noted to have fall risk factors   Instruct family/caregiver on patient safety     Problem: Respiratory - Adult  Goal: Achieves optimal ventilation and oxygenation  Outcome:  Progressing  Flowsheets (Taken 11/3/2024 1133)  Achieves optimal ventilation and oxygenation:   Assess for changes in respiratory status   Assess for changes in mentation and behavior   Oxygen supplementation based on oxygen saturation or arterial blood gases   Encourage broncho-pulmonary hygiene including cough, deep breathe, incentive spirometry   Respiratory therapy support as indicated    Weaned to RA.  Respiratory effort easy/ unlabored.

## 2024-11-03 NOTE — PROGRESS NOTES
NAME:  Eric Ovalles  YOB: 1945  MEDICAL RECORD NUMBER:  [id number]    Shift Summary:   Weaned to RA  Elizalde removed  Respiratory effort easy/ unlabored.   PCU transfer    Family updated: Yes:  Wife    Rhythm: Normal Sinus Rhythm  with PVCs    Most recent vitals:   Visit Vitals  /64   Pulse 70   Temp 97.9 °F (36.6 °C) (Oral)   Resp 17   Ht 1.854 m (6' 1\")   Wt 59 kg (130 lb 1.1 oz)   SpO2 98%   BMI 17.16 kg/m²            Respiratory support needed (if any):  - RA or - BiPAP   IPAP: 15 cmH20, CPAP/EPAP: 6 cmH2O  with increased work of breathing.  Did not use for my shift.  Weaned to RA    Admission weight Weight - Scale: 66 kg (145 lb 8.1 oz) (11/02/24 0823)    Today's weight    Wt Readings from Last 1 Encounters:   11/03/24 59 kg (130 lb 1.1 oz)        Elizalde need assessed each shift: Elizalde REMOVED at 0759  UOP >30ml/hr: YES  Last documented BM (in last 48 hrs):  Patient Vitals for the past 48 hrs:   Last BM (including prior to admit) Stool Occurrence   11/02/24 1500 10/31/24 --   11/02/24 1504 10/31/24 --   11/03/24 0830 11/03/24 1   11/03/24 1545 11/03/24 1                Restraints (in use currently or dc'd in last 12 hrs): No    Order current and documentation up to date? No    Lines/Drains reviewed @ bedside.  Peripheral IV 11/02/24 Left;Posterior Wrist (Active)   Number of days: 1       Peripheral IV 11/03/24 Left;Posterior Forearm (Active)   Number of days: 0         Drip rates at handoff:    sodium chloride         Lab Data:   CBC:   Recent Labs     11/02/24  0834 11/03/24  0509   WBC 10.0 9.4   HGB 12.2* 11.6*   HCT 36.0* 33.9*   MCV 95.1 94.0    166     BMP:    Recent Labs     11/02/24  0834 11/03/24  0509    140   K 4.1 4.1   CO2 26 25   BUN 22* 24*   CREATININE 0.9 0.8     LIVR:   Recent Labs     11/02/24  0834   AST 15   ALT 13     PT/INR: No results for input(s): \"INR\" in the last 72 hours.    Invalid input(s): \"PROT\"  APTT: No results for input(s): \"APTT\" in the

## 2024-11-03 NOTE — PROGRESS NOTES
NAME:  Eric Ovalles  YOB: 1945  MEDICAL RECORD NUMBER:  [id number]    Shift Summary: uneventful night. Breathing improved on O2 1 L, sating 95%. Bledsoe catheter in place draining c/y urine. Intermittent ATB. Downgrade PCU    Family updated: No    Rhythm: Normal Sinus Rhythm  PVCs    Most recent vitals:   Visit Vitals  BP (!) 120/49   Pulse 73   Temp 98 °F (36.7 °C)   Resp 16   Ht 1.854 m (6' 1\")   Wt 59 kg (130 lb 1.1 oz)   SpO2 98%   BMI 17.16 kg/m²           No data found.    No data found.      Respiratory support needed (if any):  - O2 - NC - 1 lpm    Admission weight Weight - Scale: 66 kg (145 lb 8.1 oz) (11/02/24 0823)    Today's weight    Wt Readings from Last 1 Encounters:   11/03/24 59 kg (130 lb 1.1 oz)        Bledsoe need assessed each shift: YES -  - continue bledsoe r/t - critical   UOP >30ml/hr: YES  Last documented BM (in last 48 hrs):  Patient Vitals for the past 48 hrs:   Last BM (including prior to admit)   11/02/24 1500 10/31/24   11/02/24 1504 10/31/24                Restraints (in use currently or dc'd in last 12 hrs): No    Order current and documentation up to date? No    Lines/Drains reviewed @ bedside.  Peripheral IV 11/02/24 Left;Posterior Wrist (Active)   Number of days: 0       Peripheral IV 11/03/24 Left;Posterior Forearm (Active)   Number of days: 0       Urinary Catheter 11/02/24 2 Way (Active)   Number of days: 0         Drip rates at handoff:    sodium chloride         Lab Data:   CBC:   Recent Labs     11/02/24  0834   WBC 10.0   HGB 12.2*   HCT 36.0*   MCV 95.1        BMP:    Recent Labs     11/02/24  0834 11/03/24  0509    140   K 4.1 4.1   CO2 26 25   BUN 22* 24*   CREATININE 0.9 0.8     LIVR:   Recent Labs     11/02/24  0834   AST 15   ALT 13     PT/INR: No results for input(s): \"INR\" in the last 72 hours.    Invalid input(s): \"PROT\"  APTT: No results for input(s): \"APTT\" in the last 72 hours.  ABG: No results for input(s): \"PHART\", \"[id number]\",

## 2024-11-03 NOTE — PROGRESS NOTES
NAME:  Eric Ovalles  YOB: 1945  MEDICAL RECORD NUMBER:  [id number]    Shift Summary: Patient admitted from ED for SOB, acute systolic congestive heart failure, possible PNA. Patient without distress, sats 97% on 2L. Eating cardiac diet without difficulty, 1.5L fluid restriction. UOP = 2.4L. Patient denies pain at this time.     Family updated: Yes:  family at bedside    Rhythm: Normal Sinus Rhythm     Most recent vitals:   Visit Vitals  /61   Pulse 74   Temp 98.5 °F (36.9 °C) (Oral)   Resp 15   Ht 1.854 m (6' 1\")   Wt 59.6 kg (131 lb 6.3 oz)   SpO2 99%   BMI 17.34 kg/m²           No data found.    No data found.      Respiratory support needed (if any):  - O2 - NC - 2 lpm    Admission weight Weight - Scale: 66 kg (145 lb 8.1 oz) (11/02/24 0823)    Today's weight    Wt Readings from Last 1 Encounters:   11/02/24 59.6 kg (131 lb 6.3 oz)        Bledsoe need assessed each shift: YES -  - continue bledsoe r/t - critical care patient needed for fluid volume management.  UOP >30ml/hr: YES  Last documented BM (in last 48 hrs):  Patient Vitals for the past 48 hrs:   Last BM (including prior to admit)   11/02/24 1500 10/31/24   11/02/24 1504 10/31/24                Restraints (in use currently or dc'd in last 12 hrs): No    Order current and documentation up to date? No    Lines/Drains reviewed @ bedside.  Peripheral IV 11/02/24 Left;Posterior Wrist (Active)   Number of days: 0       Urinary Catheter 11/02/24 2 Way (Active)   Number of days: 0         Drip rates at handoff:    sodium chloride         Lab Data:   CBC:   Recent Labs     11/02/24  0834   WBC 10.0   HGB 12.2*   HCT 36.0*   MCV 95.1        BMP:    Recent Labs     11/02/24  0834      K 4.1   CO2 26   BUN 22*   CREATININE 0.9     LIVR:   Recent Labs     11/02/24  0834   AST 15   ALT 13     PT/INR: No results for input(s): \"INR\" in the last 72 hours.    Invalid input(s): \"PROT\"  APTT: No results for input(s): \"APTT\" in the last 72  hours.  ABG: No results for input(s): \"PHART\", \"[id number]\", \"PO2ART\" in the last 72 hours.    Any consults during the shift? Yes: Consults received: : pulmonary critical care    Any signed and held orders to be released?  Yes admission orders        4 Eyes Skin Assessment       The patient is being assessed for  Shift Handoff    I agree that at least one RN has performed a thorough Head to Toe Skin Assessment on the patient. ALL assessment sites listed below have been assessed.      Areas assessed by both nurses: Head, Face, Ears, Shoulders, Back, Chest, Arms, Elbows, Hands, Sacrum. Buttock, Coccyx, Ischium, Legs. Feet and Heels, and Under Medical Devices         Does the Patient have a Wound? No noted wound(s)    Wound Care Orders initiated by RN: No       Jeremi Prevention initiated by RN: Yes    Pressure Injury (Stage 3,4, Unstageable, DTI, NWPT, and Complex wounds) if present, place Wound referral order by RN under : No    New Ostomies, if present place, Ostomy referral order under : No     Nurse 1 eSignature: Electronically signed by Adrianna Briscoe RN on 11/2/24 at 8:18 PM EDT    **SHARE this note so that the co-signing nurse can place an eSignature**    Nurse 2 eSignature: Electronically signed by Kassandra Chadwick RN on 11/3/24 at 2:57 AM EST

## 2024-11-03 NOTE — PROGRESS NOTES
Family presented information asking it to be added to the chart.     Patient is a patient of Anju Mercedes MD at [facility] Division of Cardiology

## 2024-11-04 LAB
ANION GAP SERPL CALCULATED.3IONS-SCNC: 10 MMOL/L (ref 3–16)
BASOPHILS # BLD: 0.1 K/UL (ref 0–0.2)
BASOPHILS NFR BLD: 1.1 %
BUN SERPL-MCNC: 29 MG/DL (ref 7–20)
CALCIUM SERPL-MCNC: 9.1 MG/DL (ref 8.3–10.6)
CHLORIDE SERPL-SCNC: 99 MMOL/L (ref 99–110)
CO2 SERPL-SCNC: 29 MMOL/L (ref 21–32)
CREAT SERPL-MCNC: 0.9 MG/DL (ref 0.8–1.3)
DEPRECATED RDW RBC AUTO: 14 % (ref 12.4–15.4)
EOSINOPHIL # BLD: 0.1 K/UL (ref 0–0.6)
EOSINOPHIL NFR BLD: 0.7 %
GFR SERPLBLD CREATININE-BSD FMLA CKD-EPI: 88 ML/MIN/{1.73_M2}
GLUCOSE SERPL-MCNC: 88 MG/DL (ref 70–99)
HCT VFR BLD AUTO: 32.5 % (ref 40.5–52.5)
HGB BLD-MCNC: 11.2 G/DL (ref 13.5–17.5)
LYMPHOCYTES # BLD: 2.3 K/UL (ref 1–5.1)
LYMPHOCYTES NFR BLD: 25.5 %
MAGNESIUM SERPL-MCNC: 1.99 MG/DL (ref 1.8–2.4)
MCH RBC QN AUTO: 32.1 PG (ref 26–34)
MCHC RBC AUTO-ENTMCNC: 34.5 G/DL (ref 31–36)
MCV RBC AUTO: 93 FL (ref 80–100)
MONOCYTES # BLD: 0.5 K/UL (ref 0–1.3)
MONOCYTES NFR BLD: 5.7 %
NEUTROPHILS # BLD: 6.1 K/UL (ref 1.7–7.7)
NEUTROPHILS NFR BLD: 67 %
PHOSPHATE SERPL-MCNC: 4.1 MG/DL (ref 2.5–4.9)
PLATELET # BLD AUTO: 216 K/UL (ref 135–450)
PMV BLD AUTO: 8.2 FL (ref 5–10.5)
POTASSIUM SERPL-SCNC: 3.9 MMOL/L (ref 3.5–5.1)
RBC # BLD AUTO: 3.49 M/UL (ref 4.2–5.9)
SODIUM SERPL-SCNC: 138 MMOL/L (ref 136–145)
WBC # BLD AUTO: 9.1 K/UL (ref 4–11)

## 2024-11-04 PROCEDURE — 84100 ASSAY OF PHOSPHORUS: CPT

## 2024-11-04 PROCEDURE — 80048 BASIC METABOLIC PNL TOTAL CA: CPT

## 2024-11-04 PROCEDURE — 94760 N-INVAS EAR/PLS OXIMETRY 1: CPT

## 2024-11-04 PROCEDURE — 99233 SBSQ HOSP IP/OBS HIGH 50: CPT | Performed by: INTERNAL MEDICINE

## 2024-11-04 PROCEDURE — 94640 AIRWAY INHALATION TREATMENT: CPT

## 2024-11-04 PROCEDURE — 85025 COMPLETE CBC W/AUTO DIFF WBC: CPT

## 2024-11-04 PROCEDURE — 97110 THERAPEUTIC EXERCISES: CPT

## 2024-11-04 PROCEDURE — 2060000000 HC ICU INTERMEDIATE R&B

## 2024-11-04 PROCEDURE — 6370000000 HC RX 637 (ALT 250 FOR IP): Performed by: HOSPITALIST

## 2024-11-04 PROCEDURE — 36415 COLL VENOUS BLD VENIPUNCTURE: CPT

## 2024-11-04 PROCEDURE — 6370000000 HC RX 637 (ALT 250 FOR IP): Performed by: INTERNAL MEDICINE

## 2024-11-04 PROCEDURE — 51798 US URINE CAPACITY MEASURE: CPT

## 2024-11-04 PROCEDURE — 51701 INSERT BLADDER CATHETER: CPT

## 2024-11-04 PROCEDURE — 6360000002 HC RX W HCPCS: Performed by: NURSE PRACTITIONER

## 2024-11-04 PROCEDURE — 97116 GAIT TRAINING THERAPY: CPT

## 2024-11-04 PROCEDURE — 2580000003 HC RX 258: Performed by: HOSPITALIST

## 2024-11-04 PROCEDURE — APPNB15 APP NON BILLABLE TIME 0-15 MINS: Performed by: NURSE PRACTITIONER

## 2024-11-04 PROCEDURE — 6360000002 HC RX W HCPCS: Performed by: HOSPITALIST

## 2024-11-04 PROCEDURE — 97530 THERAPEUTIC ACTIVITIES: CPT

## 2024-11-04 PROCEDURE — 99223 1ST HOSP IP/OBS HIGH 75: CPT | Performed by: INTERNAL MEDICINE

## 2024-11-04 PROCEDURE — 6370000000 HC RX 637 (ALT 250 FOR IP): Performed by: NURSE PRACTITIONER

## 2024-11-04 PROCEDURE — 97162 PT EVAL MOD COMPLEX 30 MIN: CPT

## 2024-11-04 PROCEDURE — 83735 ASSAY OF MAGNESIUM: CPT

## 2024-11-04 RX ORDER — VALSARTAN 80 MG/1
40 TABLET ORAL DAILY
Status: DISCONTINUED | OUTPATIENT
Start: 2024-11-05 | End: 2024-11-05 | Stop reason: HOSPADM

## 2024-11-04 RX ORDER — METOPROLOL SUCCINATE 25 MG/1
25 TABLET, EXTENDED RELEASE ORAL 2 TIMES DAILY
Status: DISCONTINUED | OUTPATIENT
Start: 2024-11-04 | End: 2024-11-05 | Stop reason: HOSPADM

## 2024-11-04 RX ORDER — TORSEMIDE 20 MG/1
20 TABLET ORAL DAILY
Status: DISCONTINUED | OUTPATIENT
Start: 2024-11-05 | End: 2024-11-05 | Stop reason: HOSPADM

## 2024-11-04 RX ORDER — PREDNISONE 20 MG/1
40 TABLET ORAL DAILY
Status: DISCONTINUED | OUTPATIENT
Start: 2024-11-04 | End: 2024-11-05 | Stop reason: HOSPADM

## 2024-11-04 RX ORDER — WATER 10 ML/10ML
INJECTION INTRAMUSCULAR; INTRAVENOUS; SUBCUTANEOUS
Status: DISPENSED
Start: 2024-11-04 | End: 2024-11-04

## 2024-11-04 RX ORDER — MAGNESIUM SULFATE IN WATER 40 MG/ML
2000 INJECTION, SOLUTION INTRAVENOUS ONCE
Status: COMPLETED | OUTPATIENT
Start: 2024-11-04 | End: 2024-11-04

## 2024-11-04 RX ORDER — EPLERENONE 25 MG/1
25 TABLET, FILM COATED ORAL DAILY
Status: DISCONTINUED | OUTPATIENT
Start: 2024-11-04 | End: 2024-11-05 | Stop reason: HOSPADM

## 2024-11-04 RX ADMIN — ENOXAPARIN SODIUM 40 MG: 100 INJECTION SUBCUTANEOUS at 09:09

## 2024-11-04 RX ADMIN — PANTOPRAZOLE SODIUM 40 MG: 40 TABLET, DELAYED RELEASE ORAL at 09:16

## 2024-11-04 RX ADMIN — CEFEPIME 2000 MG: 2 INJECTION, POWDER, FOR SOLUTION INTRAVENOUS at 16:41

## 2024-11-04 RX ADMIN — FLUTICASONE PROPIONATE 2 SPRAY: 50 SPRAY, METERED NASAL at 09:15

## 2024-11-04 RX ADMIN — ROSUVASTATIN CALCIUM 40 MG: 40 TABLET, FILM COATED ORAL at 09:16

## 2024-11-04 RX ADMIN — BACLOFEN 10 MG: 10 TABLET ORAL at 20:46

## 2024-11-04 RX ADMIN — PANTOPRAZOLE SODIUM 40 MG: 40 TABLET, DELAYED RELEASE ORAL at 20:46

## 2024-11-04 RX ADMIN — SODIUM CHLORIDE, PRESERVATIVE FREE 10 ML: 5 INJECTION INTRAVENOUS at 09:10

## 2024-11-04 RX ADMIN — METOPROLOL SUCCINATE 25 MG: 25 TABLET, EXTENDED RELEASE ORAL at 09:16

## 2024-11-04 RX ADMIN — IPRATROPIUM BROMIDE AND ALBUTEROL SULFATE 1 DOSE: .5; 2.5 SOLUTION RESPIRATORY (INHALATION) at 07:55

## 2024-11-04 RX ADMIN — SODIUM CHLORIDE, PRESERVATIVE FREE 5 ML: 5 INJECTION INTRAVENOUS at 20:07

## 2024-11-04 RX ADMIN — PHENYTOIN SODIUM 100 MG: 100 CAPSULE, EXTENDED RELEASE ORAL at 14:04

## 2024-11-04 RX ADMIN — MAGNESIUM SULFATE HEPTAHYDRATE 2000 MG: 40 INJECTION, SOLUTION INTRAVENOUS at 09:25

## 2024-11-04 RX ADMIN — PHENYTOIN SODIUM 100 MG: 100 CAPSULE, EXTENDED RELEASE ORAL at 20:47

## 2024-11-04 RX ADMIN — LAMOTRIGINE 200 MG: 100 TABLET ORAL at 20:47

## 2024-11-04 RX ADMIN — FOLIC ACID 1 MG: 1 TABLET ORAL at 09:09

## 2024-11-04 RX ADMIN — PHENYTOIN SODIUM 100 MG: 100 CAPSULE, EXTENDED RELEASE ORAL at 09:16

## 2024-11-04 RX ADMIN — EMPAGLIFLOZIN 10 MG: 10 TABLET, FILM COATED ORAL at 14:04

## 2024-11-04 RX ADMIN — FUROSEMIDE 40 MG: 10 INJECTION, SOLUTION INTRAMUSCULAR; INTRAVENOUS at 09:09

## 2024-11-04 RX ADMIN — BACLOFEN 10 MG: 10 TABLET ORAL at 09:09

## 2024-11-04 RX ADMIN — EPLERENONE 25 MG: 25 TABLET, FILM COATED ORAL at 14:04

## 2024-11-04 RX ADMIN — CEFEPIME 2000 MG: 2 INJECTION, POWDER, FOR SOLUTION INTRAVENOUS at 09:08

## 2024-11-04 RX ADMIN — METOPROLOL SUCCINATE 25 MG: 25 TABLET, EXTENDED RELEASE ORAL at 20:46

## 2024-11-04 RX ADMIN — IPRATROPIUM BROMIDE AND ALBUTEROL SULFATE 1 DOSE: .5; 2.5 SOLUTION RESPIRATORY (INHALATION) at 20:05

## 2024-11-04 RX ADMIN — PREDNISONE 40 MG: 20 TABLET ORAL at 09:18

## 2024-11-04 RX ADMIN — FINASTERIDE 5 MG: 5 TABLET, FILM COATED ORAL at 09:10

## 2024-11-04 RX ADMIN — LAMOTRIGINE 200 MG: 100 TABLET ORAL at 09:09

## 2024-11-04 RX ADMIN — CEFEPIME 2000 MG: 2 INJECTION, POWDER, FOR SOLUTION INTRAVENOUS at 02:21

## 2024-11-04 RX ADMIN — ASPIRIN 81 MG: 81 TABLET, COATED ORAL at 09:09

## 2024-11-04 ASSESSMENT — PAIN SCALES - GENERAL
PAINLEVEL_OUTOF10: 0
PAINLEVEL_OUTOF10: 0
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
## 2024-11-04 NOTE — PROGRESS NOTES
NAME:  Eric Ovalles  YOB: 1945  MEDICAL RECORD NUMBER:  [id number]    Shift Summary: ***    Family updated: {Yes Comment / No :61166}    Rhythm: {West  rhythm:91134:::0}    Most recent vitals:   Visit Vitals  /68   Pulse 68   Temp 98 °F (36.7 °C) (Oral)   Resp 16   Ht 1.854 m (6' 1\")   Wt 59 kg (130 lb 1.1 oz)   SpO2 98%   BMI 17.16 kg/m²           No data found.    No data found.      Respiratory support needed (if any):  [facility]    Admission weight Weight - Scale: 66 kg (145 lb 8.1 oz) (11/02/24 0823)    Today's weight    Wt Readings from Last 1 Encounters:   11/03/24 59 kg (130 lb 1.1 oz)        Bledsoe need assessed each shift: {bledsoe-yes/na:42106}  UOP >30ml/hr: {West UO yes/no:24766}  Last documented BM (in last 48 hrs):  Patient Vitals for the past 48 hrs:   Last BM (including prior to admit) Stool Occurrence   11/02/24 1500 10/31/24 --   11/02/24 1504 10/31/24 --   11/03/24 0830 11/03/24 1   11/03/24 1545 11/03/24 1                Restraints (in use currently or dc'd in last 12 hrs): {Yes Comment / No :84171}    Order current and documentation up to date? {Yes Comment / No :35239}    Lines/Drains reviewed @ bedside.  Peripheral IV 11/02/24 Left;Posterior Wrist (Active)   Number of days: 1       Peripheral IV 11/03/24 Left;Posterior Forearm (Active)   Number of days: 1         Drip rates at handoff:    sodium chloride         Lab Data:   CBC:   Recent Labs     11/03/24  0509 11/04/24 0426   WBC 9.4 9.1   HGB 11.6* 11.2*   HCT 33.9* 32.5*   MCV 94.0 93.0    216     BMP:    Recent Labs     11/03/24  0509 11/04/24  0426    138   K 4.1 3.9   CO2 25 29   BUN 24* 29*   CREATININE 0.8 0.9     LIVR:   Recent Labs     11/02/24  0834   AST 15   ALT 13     PT/INR: No results for input(s): \"INR\" in the last 72 hours.    Invalid input(s): \"PROT\"  APTT: No results for input(s): \"APTT\" in the last 72 hours.  ABG: No results for input(s): \"PHART\", \"[id number]\", \"PO2ART\" in

## 2024-11-04 NOTE — DISCHARGE INSTRUCTIONS
Extra Heart Failure Education/ Tools/ Resources:     https://[employer].com/publication/?z=669589   --- this is American Heart Association interactive Healthier Living with Heart Failure guidebook.  Please click hyperlink or copy / paste link into search bar. The QR Code is also available below. Use your mouse to scroll through the pages.  Lots of information about weight monitoring, diet tips, activity, meds, etc    Heart Failure Tools and Resources QR Code is below. It includes multiple resources to include symptom tracker, med tracker, further HF info, and access to a HF Support Network online Community    HF [place] Vicente  -- this is a free smart phone vicente available for iPhone and Android download.  Use your phone to track sodium / fluid intake, zone tool symptom tracking, weights, medications, etc. Click on this hyperlink  HF [place]   for QR code for easy download or the link is also found in the below HF Tools and Resources.      DASH (Dietary Approach to Stop Hypertension) diet --  https://www.nhlbi.nih.gov/education/dash-eating-plan -- this diet is a flexible eating plan that promotes heart healthy eating style.  Click on hyperlink or copy / paste link into search bar.  Lots of low sodium recipes and tips.    https://www.[employer]/recipes  -- more free recipes

## 2024-11-04 NOTE — PROGRESS NOTES
Attempted to place patient on BiPAP. Patient wore machine for 1-2 minutes before removing mask, stating, \"This isn't going to work\". Pt states machine is too loud and will keep him up all night. NIV refused.    Electronically signed by Darell Reddy RCP on 11/3/2024 at 11:59 PM

## 2024-11-04 NOTE — CARE COORDINATION
Case Management Assessment  Initial Evaluation    Date/Time of Evaluation: 11/4/2024 1:19 PM  Assessment Completed by: Lisa Alvarado RN    If patient is discharged prior to next notation, then this note serves as note for discharge by case management.    Patient Name: Eric Ovalles                   YOB: 1945  Diagnosis: Acute respiratory failure [J96.00]  COPD exacerbation (HCC) [J44.1]  Bilateral pleural effusion [J90]  Acute systolic congestive heart failure (HCC) [I50.21]  Acute respiratory failure with hypoxia and hypercapnia [J96.01, J96.02]                   Date / Time: 11/2/2024  8:10 AM    Patient Admission Status: Inpatient   Readmission Risk (Low < 19, Mod (19-27), High > 27): Readmission Risk Score: 16.2    Current PCP: Levar Grider MD  PCP verified by CM? Yes    Chart Reviewed: Yes      History Provided by: Patient, Spouse  Patient Orientation: Alert and Oriented    Patient Cognition: Alert    Hospitalization in the last 30 days (Readmission):  No    If yes, Readmission Assessment in  Navigator will be completed.    Advance Directives:      Code Status: DNR-CCA   Patient's Primary Decision Maker is: Legal Next of Kin    Primary Decision Maker: Juliana Ovalles - Spouse - [phone]    Secondary Decision Maker: Adrian Ovalles - Child - [phone]    Discharge Planning:    Patient lives with: Spouse/Significant Other Type of Home: House  Primary Care Giver: Other (Comment) (Spouse and Aide)  Patient Support Systems include: Spouse/Significant Other, Children   Current Financial resources: Medicare,  ([place])  Current community resources: ECF/Home Care ([facility])  Current services prior to admission: Home Care, [facility], Durable Medical Equipment            Current DME: Cane, Other (Comment) (4 prong cane, built in shower chair in a walk-in shower, 2 chair lifts (one to enter the home, and one to the lower level of the home0, grab bars in the shower, 1 small electric

## 2024-11-04 NOTE — PROGRESS NOTES
Pt working hard to breath. Change mode from CPAP/PS to full support, previous settings. RN aware.

## 2024-11-04 NOTE — PROGRESS NOTES
V2.0    [facility] Progress Note      Name:  Eric Ovalles /Age/Sex: 1945  (79 y.o. male)   MRN & CSN:  [id number] & [id number] Encounter Date/Time: 2024 12:23 PM EST   Location:  [id number]-4924 PCP: Levar Grider MD     Attending:Leia Boyd MD       Hospital Day: 3    Assessment and Recommendations   Eric Ovalles is a 79 y.o. male with pmh of  who presents with Acute respiratory failure      Plan:   Clinical status improving  Telemetry monitering  Cont IV diuresis  , lasix BID  Critical care consulted  Duonebs Q4H  Steroids :  on Prednisone  IV antibiotics : IV  cefeppime  Moniter O2 sat          CT chest s/o pleural effusion      Bronchial wall thickening of the subsegmental airways in the right  lower lobe.  Dependent atelectasis in the lower lobes bilaterally.      Empiric IV antibiotics  Blood cx : NGTD     Continue home meds for chronic conditions     H/o CHF , chronic ,systolic : EF 10 %    Cont lasix     DVT ppx : lovenox     Code status : patient and family wants DNR status     Diet    Took me more than 51  minutes for clinical management and coordination of care      Diet ADULT DIET; Regular; Low Fat/Low Chol/High Fiber/2 gm Na; 1500 ml   DVT Prophylaxis [] Lovenox, []  Heparin, [] SCDs, [] Ambulation,  [] Eliquis, [] Xarelto  [] Coumadin   Code Status DNR-CCA   Disposition From:   Expected Disposition:   Estimated Date of Discharge:   Patient requires continued admission due to    Surrogate Decision Maker/ POA       Personally reviewed Lab Studies and Imaging       Discussed management of the case with pulmonology , cont diuresis     Telemetry reviewed by myself no ST elevation       Drugs that require monitoring for toxicity include steroids and the method of monitoring was blood sugar check      Medical Decision Making:  The following items were considered in medical decision making:  Discussion of patient care with other providers  Reviewed clinical lab  clinical lab tests  Reviewed radiology tests  Reviewed other diagnostic tests/interventions  Independent review of radiologic images  Microbiology cultures and other micro tests reviewed            Subjective:     Chief Complaint    Eric Ovalles is a 79 y.o. male who presents with     On room air  SOB improving  Not in acute distress  Significant improvement in O2 requirement and SOB  Denies chest pain    Review of Systems:      Pertinent positives and negatives discussed in HPI    Objective:     Intake/Output Summary (Last 24 hours) at 11/4/2024 1225  Last data filed at 11/4/2024 1137  Gross per 24 hour   Intake 706.86 ml   Output 1250 ml   Net -543.14 ml      Vitals:   Vitals:    11/04/24 0745 11/04/24 0755 11/04/24 1000 11/04/24 1137   BP: (!) 121/50   123/75   Pulse: 76 71 69 82   Resp: 19 14 13 21   Temp: 98.8 °F (37.1 °C)   98.2 °F (36.8 °C)   TempSrc: Oral   Oral   SpO2: 96% 94% 97% 96%   Weight:       Height:             Physical Exam:      General: NAD  Eyes: EOMI  ENT: neck supple  Cardiovascular: Regular rate.  Respiratory: reduced air entry  Gastrointestinal: Soft, non tender  Genitourinary: no suprapubic tenderness  Musculoskeletal: No edema        Medications:   Medications:    sterile water        predniSONE  40 mg Oral Daily    furosemide  40 mg IntraVENous BID    rosuvastatin  40 mg Oral Daily    finasteride  5 mg Oral Daily    aspirin  81 mg Oral Daily    metoprolol succinate  25 mg Oral Daily    baclofen  10 mg Oral BID    cefepime  2,000 mg IntraVENous Q8H    phenytoin  100 mg Oral TID    ipratropium 0.5 mg-albuterol 2.5 mg  1 Dose Inhalation BID RT    sodium chloride flush  5-40 mL IntraVENous 2 times per day    enoxaparin  40 mg SubCUTAneous Daily    fluticasone  2 spray Nasal Daily    folic acid  1 mg Oral Daily    lamoTRIgine  200 mg Oral BID    pantoprazole  40 mg Oral BID      Infusions:    sodium chloride       PRN Meds: sterile water, ,   oxyCODONE, 5 mg, Daily PRN  ipratropium 0.5    CREATININE 0.9 0.8 0.9   GLUCOSE 263* 110* 88     Hepatic:   Recent Labs     11/02/24  0834   AST 15   ALT 13   BILITOT 0.3   ALKPHOS 173*     Lipids: No results found for: \"CHOL\", \"HDL\", \"TRIG\"  Hemoglobin A1C: No results found for: \"LABA1C\"  TSH: No results found for: \"TSH\"  Troponin: No results found for: \"TROPONINT\"  Lactic Acid: No results for input(s): \"LACTA\" in the last 72 hours.  BNP:   Recent Labs     11/02/24  0834   PROBNP 18,838*     UA:No results found for: \"NITRU\", \"COLORU\", \"PHUR\", \"LABCAST\", \"WBCUA\", \"RBCUA\", \"MUCUS\", \"TRICHOMONAS\", \"YEAST\", \"BACTERIA\", \"CLARITYU\", \"SPECGRAV\", \"LEUKOCYTESUR\", \"UROBILINOGEN\", \"BILIRUBINUR\", \"BLOODU\", \"GLUCOSEU\", \"KETUA\", \"AMORPHOUS\"  Urine Cultures: No results found for: \"LABURIN\"  Blood Cultures:   Lab Results   Component Value Date/Time    BC  11/02/2024 08:34 AM     No Growth to date.  Any change in status will be called.     Lab Results   Component Value Date/Time    BLOODCULT2  11/02/2024 10:26 AM     No Growth to date.  Any change in status will be called.     Organism: No results found for: \"ORG\"      Electronically signed by Leia Boyd MD on 11/4/2024 at 12:25 PM

## 2024-11-04 NOTE — CONSULTS
Cardiovascular Consultation     Attending Physician: Leia Boyd MD    PATIENT: Eric Ovalles  : 1945  MRN: [id number]    Reason for Consultation:   Chief Complaint   Patient presents with    Shortness of Breath     Pt presents to the ED via [facility] with c/c of SOB that began this morning about an hour ago. Pt at baseline wears no oxygen, EMS found him 84% at home on room air. Pt currently on 6L nasal cannula, oxygen saturation 94%. Pt denies any pain at this time. Endorses recent diagnosis of kidney and bladder cancer, hasn't started treatment yet.     History of present illness:   Mr. Eric Ovalles is a 79 y.o. male patient with a recent diagnosis of systolic heart failure, followed at [facility] advanced heart failure clinic, who presented 2024 by EMS with abrupt shortness of breath.  Mr. Ovalles had made his follow-up 10/24/2024 and was with New York Heart Association class I-II status at that time by chart review.  His dry weight was reported at 140 pounds.  He has remained compliant with medications without side effect concerns.  He states that he and his wife eat 2 meals a day and he does not feel that sodium is an issue.  He does admit that one of the 2 meals tends to be out at a restaurant.  Reports an estimated 66 ounces of fluid daily.  He does not weigh himself at home.  He had just returned the Holter monitor and not yet completed a cardiac MRI that was ordered.  States that his other MRI resulted and he was starting to arrange appointments for new diagnosis of renal cell carcinoma.   Shortness of breath at rest with reported orthopnea.  He denied significant peripheral edema.  No fevers, chills, cough, sick contacts.    Medical History:      Diagnosis Date    Arthritis     Bladder cancer (HCC)     Cancer of kidney (HCC)     CHF (congestive heart failure) (HCC)     COPD (chronic obstructive pulmonary disease) (HCC)     Focal seizures (HCC)      Main pulmonary artery is normal in caliber.     Mediastinum: Mild cardiomegaly.  No pericardial effusion.  Coronary artery  atherosclerosis.  There is no evidence of hilar or mediastinal adenopathy.     Lungs/pleura: Moderate right pleural effusion and small left pleural  effusion.  Bronchial wall thickening of the subsegmental airways in the right  lower lobe.  Dependent atelectasis in the lower lobes bilaterally.  Moderate  centrilobular emphysema.  No pneumothorax.  Central tracheobronchial airways  are unremarkable.     Upper Abdomen: Heterogeneous mass in the posterior aspect of the right renal  cortex, only partially seen on this exam.  Abdominal aorta is atherosclerotic.     Soft Tissues/Bones: Sclerotic lesion in the T5 vertebral body.  Although  there are some features suggestive of a vertebral body hemangioma, other  features are indeterminate.      EKG:    Sinus tachycardia  Possible Left atrial enlargement  Left ventricular hypertrophy with QRS widening  ST & T wave abnormality, consider inferior ischemia    Blood cultures NGTD   ProBNP 18,838  Tele: SR 60-70s   145-->130 lbs  -2.9L     Impression/Recommendations    Mr. Eric Ovalles is a 79 y.o. male patient of Dr. Yara Mercedes    Acute hypoxic respiratory failure   Acute on chronic systolic HF(LVEF 10-15% by 10/2024 TTE), NYHA Class IV on admit  Cardiomyopathy, mixed nonischemic/ischemic   CAD: Hx MI with PCI reported 1998 (Proximal RCA  with robust L->R collaterals by 10/4/24 [facility] report @ [facility]; no LCA disease reported)  COPD  Tobacco   Hx. PE 2/2024, completed AC 7/2024   Renal cell carcinoma  Seizure disorder on AED  Hx. SDH  DNR CCA        Mr. Ovalles was seen at [facility] 10/24/24 following initial hospitalization with new systolic HF diagnosis earlier last month.   He was continued on Metoprolol succinate and Valsartan. Eplerenone and Jardiance were started after initial limitations 2/2

## 2024-11-04 NOTE — PLAN OF CARE
Problem: Skin/Tissue Integrity  Goal: Absence of new skin breakdown  Description: 1.  Monitor for areas of redness and/or skin breakdown  2.  Assess vascular access sites hourly  3.  Every 4-6 hours minimum:  Change oxygen saturation probe site  4.  Every 4-6 hours:  If on nasal continuous positive airway pressure, respiratory therapy assess nares and determine need for appliance change or resting period.  11/3/2024 2221 by Sarah Leonard RN  Outcome: Progressing  11/3/2024 1132 by Cecilia Branham RN  Outcome: Progressing     Problem: Chronic Conditions and Co-morbidities  Goal: Patient's chronic conditions and co-morbidity symptoms are monitored and maintained or improved  11/3/2024 2221 by Sarah Leonard RN  Outcome: Progressing  11/3/2024 1132 by Cecilia Branham RN  Outcome: Progressing  Flowsheets (Taken 11/3/2024 0830)  Care Plan - Patient's Chronic Conditions and Co-Morbidity Symptoms are Monitored and Maintained or Improved:   Monitor and assess patient's chronic conditions and comorbid symptoms for stability, deterioration, or improvement   Collaborate with multidisciplinary team to address chronic and comorbid conditions and prevent exacerbation or deterioration   Update acute care plan with appropriate goals if chronic or comorbid symptoms are exacerbated and prevent overall improvement and discharge     Problem: Discharge Planning  Goal: Discharge to home or other facility with appropriate resources  11/3/2024 2221 by Sarah Leonard RN  Outcome: Progressing  11/3/2024 1132 by Cecilia Branham RN  Outcome: Progressing  Flowsheets (Taken 11/3/2024 0830)  Discharge to home or other facility with appropriate resources:   Identify barriers to discharge with patient and caregiver   Arrange for needed discharge resources and transportation as appropriate     Problem: Pain  Goal: Verbalizes/displays adequate comfort level or baseline comfort level  Outcome: Progressing     Problem: ABCDS Injury  Assessment  Goal: Absence of physical injury  Outcome: Progressing     Problem: Safety - Adult  Goal: Free from fall injury  11/3/2024 2221 by Sarah Leonard RN  Outcome: Progressing  11/3/2024 1132 by Cecilia Branham RN  Outcome: Progressing  Flowsheets (Taken 11/3/2024 1132)  Free From Fall Injury:   Based on caregiver fall risk screen, instruct family/caregiver to ask for assistance with transferring infant if caregiver noted to have fall risk factors   Instruct family/caregiver on patient safety     Problem: Respiratory - Adult  Goal: Achieves optimal ventilation and oxygenation  11/3/2024 2221 by Sarah Leonard RN  Outcome: Progressing  11/3/2024 1133 by Cecilia Branham RN  Outcome: Progressing  Flowsheets (Taken 11/3/2024 1133)  Achieves optimal ventilation and oxygenation:   Assess for changes in respiratory status   Assess for changes in mentation and behavior   Oxygen supplementation based on oxygen saturation or arterial blood gases   Encourage broncho-pulmonary hygiene including cough, deep breathe, incentive spirometry   Respiratory therapy support as indicated
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
## 2024-11-05 ENCOUNTER — APPOINTMENT (OUTPATIENT)
Dept: GENERAL RADIOLOGY | Age: 79
DRG: 291 | End: 2024-11-05
Payer: MEDICARE

## 2024-11-05 VITALS
SYSTOLIC BLOOD PRESSURE: 134 MMHG | HEIGHT: 73 IN | DIASTOLIC BLOOD PRESSURE: 54 MMHG | HEART RATE: 65 BPM | OXYGEN SATURATION: 94 % | WEIGHT: 131.84 LBS | RESPIRATION RATE: 16 BRPM | TEMPERATURE: 98.2 F | BODY MASS INDEX: 17.47 KG/M2

## 2024-11-05 LAB
ANION GAP SERPL CALCULATED.3IONS-SCNC: 10 MMOL/L (ref 3–16)
BASOPHILS # BLD: 0.1 K/UL (ref 0–0.2)
BASOPHILS NFR BLD: 0.7 %
BUN SERPL-MCNC: 34 MG/DL (ref 7–20)
CALCIUM SERPL-MCNC: 8.8 MG/DL (ref 8.3–10.6)
CHLORIDE SERPL-SCNC: 104 MMOL/L (ref 99–110)
CO2 SERPL-SCNC: 27 MMOL/L (ref 21–32)
CREAT SERPL-MCNC: 0.9 MG/DL (ref 0.8–1.3)
DEPRECATED RDW RBC AUTO: 14.1 % (ref 12.4–15.4)
EOSINOPHIL # BLD: 0.1 K/UL (ref 0–0.6)
EOSINOPHIL NFR BLD: 0.8 %
GFR SERPLBLD CREATININE-BSD FMLA CKD-EPI: 88 ML/MIN/{1.73_M2}
GLUCOSE SERPL-MCNC: 97 MG/DL (ref 70–99)
HCT VFR BLD AUTO: 32 % (ref 40.5–52.5)
HGB BLD-MCNC: 10.8 G/DL (ref 13.5–17.5)
LYMPHOCYTES # BLD: 2.1 K/UL (ref 1–5.1)
LYMPHOCYTES NFR BLD: 24.7 %
MAGNESIUM SERPL-MCNC: 2.55 MG/DL (ref 1.8–2.4)
MCH RBC QN AUTO: 31.7 PG (ref 26–34)
MCHC RBC AUTO-ENTMCNC: 33.6 G/DL (ref 31–36)
MCV RBC AUTO: 94.2 FL (ref 80–100)
MONOCYTES # BLD: 0.6 K/UL (ref 0–1.3)
MONOCYTES NFR BLD: 7.3 %
NEUTROPHILS # BLD: 5.7 K/UL (ref 1.7–7.7)
NEUTROPHILS NFR BLD: 66.5 %
PHOSPHATE SERPL-MCNC: 3.4 MG/DL (ref 2.5–4.9)
PLATELET # BLD AUTO: 215 K/UL (ref 135–450)
PMV BLD AUTO: 9.2 FL (ref 5–10.5)
POTASSIUM SERPL-SCNC: 3.7 MMOL/L (ref 3.5–5.1)
RBC # BLD AUTO: 3.4 M/UL (ref 4.2–5.9)
SODIUM SERPL-SCNC: 141 MMOL/L (ref 136–145)
WBC # BLD AUTO: 8.6 K/UL (ref 4–11)

## 2024-11-05 PROCEDURE — 83735 ASSAY OF MAGNESIUM: CPT

## 2024-11-05 PROCEDURE — 94760 N-INVAS EAR/PLS OXIMETRY 1: CPT

## 2024-11-05 PROCEDURE — 6360000002 HC RX W HCPCS: Performed by: HOSPITALIST

## 2024-11-05 PROCEDURE — 99233 SBSQ HOSP IP/OBS HIGH 50: CPT | Performed by: INTERNAL MEDICINE

## 2024-11-05 PROCEDURE — 94640 AIRWAY INHALATION TREATMENT: CPT

## 2024-11-05 PROCEDURE — 97530 THERAPEUTIC ACTIVITIES: CPT

## 2024-11-05 PROCEDURE — 51701 INSERT BLADDER CATHETER: CPT

## 2024-11-05 PROCEDURE — 71045 X-RAY EXAM CHEST 1 VIEW: CPT

## 2024-11-05 PROCEDURE — 6370000000 HC RX 637 (ALT 250 FOR IP): Performed by: INTERNAL MEDICINE

## 2024-11-05 PROCEDURE — 51798 US URINE CAPACITY MEASURE: CPT

## 2024-11-05 PROCEDURE — 6370000000 HC RX 637 (ALT 250 FOR IP): Performed by: HOSPITALIST

## 2024-11-05 PROCEDURE — 80048 BASIC METABOLIC PNL TOTAL CA: CPT

## 2024-11-05 PROCEDURE — 97116 GAIT TRAINING THERAPY: CPT

## 2024-11-05 PROCEDURE — 85025 COMPLETE CBC W/AUTO DIFF WBC: CPT

## 2024-11-05 PROCEDURE — 36415 COLL VENOUS BLD VENIPUNCTURE: CPT

## 2024-11-05 PROCEDURE — 2580000003 HC RX 258: Performed by: HOSPITALIST

## 2024-11-05 PROCEDURE — 84100 ASSAY OF PHOSPHORUS: CPT

## 2024-11-05 PROCEDURE — 6370000000 HC RX 637 (ALT 250 FOR IP): Performed by: NURSE PRACTITIONER

## 2024-11-05 RX ORDER — EPLERENONE 25 MG/1
25 TABLET, FILM COATED ORAL DAILY
Qty: 30 TABLET | Refills: 3 | Status: SHIPPED | OUTPATIENT
Start: 2024-11-05

## 2024-11-05 RX ORDER — PREDNISONE 20 MG/1
40 TABLET ORAL DAILY
Qty: 4 TABLET | Refills: 0 | Status: SHIPPED | OUTPATIENT
Start: 2024-11-06 | End: 2024-11-08

## 2024-11-05 RX ORDER — METOPROLOL SUCCINATE 50 MG/1
25 TABLET, EXTENDED RELEASE ORAL 2 TIMES DAILY
Qty: 30 TABLET | Refills: 3 | Status: SHIPPED | OUTPATIENT
Start: 2024-11-05

## 2024-11-05 RX ORDER — TORSEMIDE 20 MG/1
20 TABLET ORAL DAILY
Qty: 30 TABLET | Refills: 3 | Status: SHIPPED | OUTPATIENT
Start: 2024-11-06

## 2024-11-05 RX ADMIN — BACLOFEN 10 MG: 10 TABLET ORAL at 10:13

## 2024-11-05 RX ADMIN — CEFEPIME 2000 MG: 2 INJECTION, POWDER, FOR SOLUTION INTRAVENOUS at 10:21

## 2024-11-05 RX ADMIN — TORSEMIDE 20 MG: 20 TABLET ORAL at 10:14

## 2024-11-05 RX ADMIN — LAMOTRIGINE 200 MG: 100 TABLET ORAL at 10:13

## 2024-11-05 RX ADMIN — IPRATROPIUM BROMIDE AND ALBUTEROL SULFATE 1 DOSE: .5; 2.5 SOLUTION RESPIRATORY (INHALATION) at 07:48

## 2024-11-05 RX ADMIN — METOPROLOL SUCCINATE 25 MG: 25 TABLET, EXTENDED RELEASE ORAL at 10:14

## 2024-11-05 RX ADMIN — FOLIC ACID 1 MG: 1 TABLET ORAL at 10:13

## 2024-11-05 RX ADMIN — ASPIRIN 81 MG: 81 TABLET, COATED ORAL at 10:13

## 2024-11-05 RX ADMIN — PHENYTOIN SODIUM 100 MG: 100 CAPSULE, EXTENDED RELEASE ORAL at 10:13

## 2024-11-05 RX ADMIN — EMPAGLIFLOZIN 10 MG: 10 TABLET, FILM COATED ORAL at 10:13

## 2024-11-05 RX ADMIN — FINASTERIDE 5 MG: 5 TABLET, FILM COATED ORAL at 10:12

## 2024-11-05 RX ADMIN — SODIUM CHLORIDE: 9 INJECTION, SOLUTION INTRAVENOUS at 10:21

## 2024-11-05 RX ADMIN — VALSARTAN 40 MG: 80 TABLET ORAL at 10:14

## 2024-11-05 RX ADMIN — CEFEPIME 2000 MG: 2 INJECTION, POWDER, FOR SOLUTION INTRAVENOUS at 00:55

## 2024-11-05 RX ADMIN — ROSUVASTATIN CALCIUM 40 MG: 40 TABLET, FILM COATED ORAL at 10:12

## 2024-11-05 RX ADMIN — PREDNISONE 40 MG: 20 TABLET ORAL at 10:13

## 2024-11-05 RX ADMIN — EPLERENONE 25 MG: 25 TABLET, FILM COATED ORAL at 13:40

## 2024-11-05 RX ADMIN — PANTOPRAZOLE SODIUM 40 MG: 40 TABLET, DELAYED RELEASE ORAL at 10:12

## 2024-11-05 ASSESSMENT — PAIN SCALES - GENERAL
PAINLEVEL_OUTOF10: 0
PAINLEVEL_OUTOF10: 0

## 2024-11-05 NOTE — PLAN OF CARE
Problem: Skin/Tissue Integrity  Goal: Absence of new skin breakdown  Description: 1.  Monitor for areas of redness and/or skin breakdown  2.  Assess vascular access sites hourly  3.  Every 4-6 hours minimum:  Change oxygen saturation probe site  4.  Every 4-6 hours:  If on nasal continuous positive airway pressure, respiratory therapy assess nares and determine need for appliance change or resting period.  Outcome: Progressing     Problem: Chronic Conditions and Co-morbidities  Goal: Patient's chronic conditions and co-morbidity symptoms are monitored and maintained or improved  Outcome: Progressing  Flowsheets (Taken 11/4/2024 2000)  Care Plan - Patient's Chronic Conditions and Co-Morbidity Symptoms are Monitored and Maintained or Improved:   Monitor and assess patient's chronic conditions and comorbid symptoms for stability, deterioration, or improvement   Collaborate with multidisciplinary team to address chronic and comorbid conditions and prevent exacerbation or deterioration   Update acute care plan with appropriate goals if chronic or comorbid symptoms are exacerbated and prevent overall improvement and discharge     Problem: Discharge Planning  Goal: Discharge to home or other facility with appropriate resources  Outcome: Progressing  Flowsheets (Taken 11/4/2024 2000)  Discharge to home or other facility with appropriate resources:   Identify barriers to discharge with patient and caregiver   Arrange for needed discharge resources and transportation as appropriate   Identify discharge learning needs (meds, wound care, etc)     Problem: Pain  Goal: Verbalizes/displays adequate comfort level or baseline comfort level  Outcome: Progressing  Flowsheets (Taken 11/4/2024 2000)  Verbalizes/displays adequate comfort level or baseline comfort level:   Encourage patient to monitor pain and request assistance   Assess pain using appropriate pain scale   Administer analgesics based on type and severity of pain and

## 2024-11-05 NOTE — DISCHARGE INSTR - DIET

## 2024-11-05 NOTE — PROGRESS NOTES
Patient's wife, Juliana, called and updated on patient being transferred to new room/unit. All questions answered at this time.

## 2024-11-05 NOTE — PROGRESS NOTES
by assistance (L UE assists with use of bedrail.)  Transfers  Sit to Stand: Stand by assistance  Stand to Sit: Stand by assistance  Comment: Transfers completed from eob, toilet and recliner with Quad Cane SBA, can be effortful/few attempts to upright initially.  Ambulation  Surface: Level tile  Device: Small Base Quad Cane  Distance: Pt amb 4-5 steps eob to recliner, 25' x 2 to/from bathroom, additional 40'  with Quad Cane Slight CGA (pt does not like hands on gait belt assist).  Flexed posture, diminished step length/clearance with step to gait.  R LE hyperext during wgt bear/stance.  Narrow praful; looks very unsteady (chronic) although no specific LOB.     Balance  Comments: Pt initially at eob, tends to lean posteriorly.  Assist with socks, R AFO and Shoes.  Pt requiring assist for clean depends following use of toilet.             AM-PAC - Mobility    AM-PAC Basic Mobility - Inpatient   How much help is needed turning from your back to your side while in a flat bed without using bedrails?: None  How much help is needed moving from lying on your back to sitting on the side of a flat bed without using bedrails?: A Little  How much help is needed moving to and from a bed to a chair?: A Little  How much help is needed standing up from a chair using your arms?: A Little  How much help is needed walking in hospital room?: A Little  How much help is needed climbing 3-5 steps with a railing?: A Lot  AM-PAC Inpatient Mobility Raw Score : 18  AM-PAC Inpatient T-Scale Score : 43.63  Mobility Inpatient CMS 0-100% Score: 46.58  Mobility Inpatient CMS G-Code Modifier : CK           Goals  Short Term Goals  Time Frame for Short Term Goals: Upon d/c acute care setting.  Short Term Goal 1: Bed Mob Supervision.  Short Term Goal 2: Transfers with assist device/R AFO SBA/Supervision.  Short Term Goal 3: Amb with assist device/R AFO ' SBA.  Patient Goals   Patient Goals : Return home.       Education  Patient  Education  Education Given To: Patient  Education Provided Comments: Role of PT, POC, Need to call for assist, Safe mobility with Quad Cane.  Education Method: Verbal  Education Outcome: Continued education needed      Therapy Time   Individual Concurrent Group Co-treatment   Time In 1120         Time Out 1200         Minutes 40                 Nelida Dan, PT

## 2024-11-05 NOTE — PROGRESS NOTES
Cardiovascular Progress Note      Chief Complaint:   Chief Complaint   Patient presents with    Shortness of Breath     Pt presents to the ED via [facility] with c/c of SOB that began this morning about an hour ago. Pt at baseline wears no oxygen, EMS found him 84% at home on room air. Pt currently on 6L nasal cannula, oxygen saturation 94%. Pt denies any pain at this time. Endorses recent diagnosis of kidney and bladder cancer, hasn't started treatment yet.     Impression/Recommendations:    Mr. Eric Ovalles is a 79 y.o. male patient of Dr. Yara Mercedes     Acute hypoxic respiratory failure   Acute on chronic systolic HF(LVEF 10-15% by 10/2024 TTE), NYHA Class IV on admit  Cardiomyopathy, mixed nonischemic/ischemic   CAD: Hx MI with PCI reported 1998 (Proximal RCA  with robust L->R collaterals by 10/4/24 [facility] report @ [facility]; no LCA disease reported)  COPD  Tobacco   Hx. PE 2/2024, completed AC 7/2024   Renal cell carcinoma  Seizure disorder on AED  Hx. SDH  DNR CCA          Mr. Ovalles was seen at [facility] 10/24/24 following initial hospitalization with new systolic HF diagnosis earlier last month.   He was continued on Metoprolol succinate and Valsartan. Eplerenone and Jardiance were started after initial limitations 2/2 hypotension in hospital. He is tolerating GDMT.  Holter monitor was just returned. Cardiac MRI was not yet completed in workup.   Discussed importance of monitoring daily weights and dietary intake closer.   DC IV Lasix following initial diuresis of nearly 3 L. OK to DC home today from cardiac stance on Torsemide. Estimated dry weight 130-131 lbs. Arrange close FU at [facility].  Antibiotics/steroids per Pulmonology.     Given nonischemic etiology and lack of NSVT by monitoring here, no Lifevest at this time.      GDMT  for EF </=40%     Beta Blocker--GDMT beta blocker: Toprol XL  contraindication/beta blocker: none     ACEi/ARB -  anterior   descending gives rise to 2 diagonals and 4 septals. The left circumflex   gives rise to 3 obtuse marginals and no posterolaterals. The right coronary   gives rise to 1 RV marginal and no posterolaterals.   Left main: The left main is normal.   LAD: The LAD is calcified. minor luminal irregularities   1st diagonal:  Proximal vessel stenosis: There is a 40%stenosis. Proximal   vessel calcified vs old stent.   Left circumflex:  Minor luminal irregularities   1st obtuse marginal:  Mild diffuse disease   Right coronary: Prior intervention: stent, in the proximal RCA.  Proximal   vessel occlusion: There is a chronic total occlusion.  Proximal vessel   stenosis: There is a 50%stenosis. Collateral flow from the first septal to   the mid RCA. Collateral flow from the distalcircumflex to the mid RCA.   Collateral flow from the distalLAD to the distal RCA.   1. LM normal      LAD luminal irregularities,      D1 calcified vs prior stent proximally with 50% disease.      LCX mild luminal irregularites      RCA  proximally due to ISR with robust L>R collaterals.      LVEDP 6 mmHG.   2. Coraonary artery disease does not explain the degree of LV dysfunction.   RECOMMENDATIONS:     1. Findings conveyed to Inpatient cardiology team.      10/4/24 TTE  Study Conclusions     - Left ventricle: The cavity size is normal. Wall thickness is normal.     Systolic function is severely reduced. The estimated ejection fraction is     10-15%.   - Mitral valve: There is mild to moderate regurgitation.   - Right ventricle: Poorly visualized.   - Atrial septum: Agitated saline contrast study shows no right-to-left     atrial level shunt.       Medications:  predniSONE (DELTASONE) tablet 40 mg, Daily  metoprolol succinate (TOPROL XL) extended release tablet 25 mg, BID  empagliflozin (JARDIANCE) tablet 10 mg, Daily  valsartan (DIOVAN) tablet 40 mg, Daily  eplerenone (INSPRA) tablet 25 mg, Daily  torsemide (DEMADEX) tablet 20 mg,  Daily  rosuvastatin (CRESTOR) tablet 40 mg, Daily  finasteride (PROSCAR) tablet 5 mg, Daily  aspirin EC tablet 81 mg, Daily  oxyCODONE (ROXICODONE) immediate release tablet 5 mg, Daily PRN  baclofen (LIORESAL) tablet 10 mg, BID  ceFEPIme (MAXIPIME) 2,000 mg in sodium chloride 0.9 % 100 mL IVPB (mini-bag), Q8H  phenytoin (DILANTIN) ER capsule 100 mg, TID  ipratropium 0.5 mg-albuterol 2.5 mg (DUONEB) nebulizer solution 1 Dose, BID RT  ipratropium 0.5 mg-albuterol 2.5 mg (DUONEB) nebulizer solution 1 Dose, Q4H PRN  sodium chloride flush 0.9 % injection 5-40 mL, 2 times per day  sodium chloride flush 0.9 % injection 5-40 mL, PRN  0.9 % sodium chloride infusion, PRN  potassium chloride (KLOR-CON M) extended release tablet 40 mEq, PRN   Or  potassium bicarb-citric acid (EFFER-K) effervescent tablet 40 mEq, PRN  magnesium sulfate 2000 mg in 50 mL IVPB premix, PRN  enoxaparin (LOVENOX) injection 40 mg, Daily  ondansetron (ZOFRAN-ODT) disintegrating tablet 4 mg, Q8H PRN   Or  ondansetron (ZOFRAN) injection 4 mg, Q6H PRN  polyethylene glycol (GLYCOLAX) packet 17 g, Daily PRN  acetaminophen (TYLENOL) tablet 650 mg, Q6H PRN   Or  acetaminophen (TYLENOL) suppository 650 mg, Q6H PRN  fluticasone (FLONASE) 50 MCG/ACT nasal spray 2 spray, Daily  folic acid (FOLVITE) tablet 1 mg, Daily  lamoTRIgine (LAMICTAL) tablet 200 mg, BID  pantoprazole (PROTONIX) tablet 40 mg, BID        I/O:     Intake/Output Summary (Last 24 hours) at 11/5/2024 0820  Last data filed at 11/5/2024 0500  Gross per 24 hour   Intake 751.75 ml   Output 800 ml   Net -48.25 ml       Physical Exam:    BP (!) 134/54   Pulse 65   Temp 98.2 °F (36.8 °C) (Oral)   Resp 16   Ht 1.854 m (6' 1\")   Wt 59.8 kg (131 lb 13.4 oz)   SpO2 94%   BMI 17.39 kg/m²   Wt Readings from Last 3 Encounters:   11/05/24 59.8 kg (131 lb 13.4 oz)   02/23/24 84.4 kg (186 lb 1.1 oz)   01/06/24 87.1 kg (192 lb)       GENERAL: Well developed, well nourished, no acute distress  NEUROLOGICAL:

## 2024-11-05 NOTE — PROGRESS NOTES
NAME:  Eric Ovalles  YOB: 1945  MEDICAL RECORD NUMBER:  [id number]    Shift Summary: Patient doing well, IV to oral steroids, up to chair with therapy, medication changes, xray in the morning for treatment evaluation. Consult to cardiology for heart failure.    Family updated: Yes:       Rhythm: Normal Sinus Rhythm     Most recent vitals:   Visit Vitals  BP (!) 107/52   Pulse 75   Temp 97.9 °F (36.6 °C) (Oral)   Resp 17   Ht 1.854 m (6' 1\")   Wt 59.9 kg (132 lb 0.9 oz)   SpO2 96%   BMI 17.42 kg/m²           No data found.    No data found.      Respiratory support needed (if any):  - RA    Admission weight Weight - Scale: 66 kg (145 lb 8.1 oz) (11/02/24 0823)    Today's weight    Wt Readings from Last 1 Encounters:   11/04/24 59.9 kg (132 lb 0.9 oz)        Bledsoe need assessed each shift: N/A - no bledsoe present  UOP >30ml/hr: YES  Last documented BM (in last 48 hrs):  Patient Vitals for the past 48 hrs:   Last BM (including prior to admit) Stool Occurrence   11/03/24 0830 11/03/24 1   11/03/24 1545 11/03/24 1   11/04/24 1300 -- 1   11/04/24 1800 11/04/24 --                Restraints (in use currently or dc'd in last 12 hrs): No    Order current and documentation up to date? No    Lines/Drains reviewed @ bedside.  Peripheral IV 11/02/24 Left;Posterior Wrist (Active)   Number of days: 2       Peripheral IV 11/03/24 Left;Posterior Forearm (Active)   Number of days: 1         Drip rates at handoff:    sodium chloride         Lab Data:   CBC:   Recent Labs     11/03/24  0509 11/04/24  0426   WBC 9.4 9.1   HGB 11.6* 11.2*   HCT 33.9* 32.5*   MCV 94.0 93.0    216     BMP:    Recent Labs     11/03/24  0509 11/04/24  0426    138   K 4.1 3.9   CO2 25 29   BUN 24* 29*   CREATININE 0.8 0.9     LIVR:   Recent Labs     11/02/24  0834   AST 15   ALT 13     PT/INR: No results for input(s): \"INR\" in the last 72 hours.    Invalid input(s): \"PROT\"  APTT: No results for input(s): \"APTT\" in the last 72  hours.  ABG: No results for input(s): \"PHART\", \"SYE3KZA\", \"PO2ART\" in the last 72 hours.    Any consults during the shift? Yes: Consults received: : Cardiology    Any signed and held orders to be released?  No        4 Eyes Skin Assessment       The patient is being assessed for  Shift Handoff    I agree that at least one RN has performed a thorough Head to Toe Skin Assessment on the patient. ALL assessment sites listed below have been assessed.      Areas assessed by both nurses: Head, Face, Ears, Shoulders, Back, Chest, Arms, Elbows, Hands, Sacrum. Buttock, Coccyx, Ischium, Legs. Feet and Heels, and Under Medical Devices         Does the Patient have a Wound? No noted wound(s)    Wound Care Orders initiated by RN: No       Jeremi Prevention initiated by RN: No    Pressure Injury (Stage 3,4, Unstageable, DTI, NWPT, and Complex wounds) if present, place Wound referral order by RN under : No    New Ostomies, if present place, Ostomy referral order under : No     Nurse 1 eSignature: Electronically signed by FRANCO TATE RN on 11/4/24 at 7:11 PM EST    **SHARE this note so that the co-signing nurse can place an eSignature**    Nurse 2 eSignature: Electronically signed by Lulú Lane RN on 11/4/24 at 7:32 PM EST

## 2024-11-05 NOTE — PROGRESS NOTES
Discharge orders acknowledged by RN . Discharge teaching completed with pt and family. AVS reviewed and all questions answered. Medication regimen reviewed and pt understands schedule. Follow up appointments also reviewed with pt and resources given for discharge. Pt meds sent electronic to be filled and understands schedule. IVs removed. Bedside monitor removed from pt. 60+ minutes of education completed. Required core measures completed. Pt vitals WDL. Pt discharged with all belongings to home with wife. Pt transported off of unit via wheelchair. No complications.      Electronically signed by NORA DEL RIO RN on 11/5/2024 at 2:42 PM
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
## 2024-11-05 NOTE — PLAN OF CARE
Problem: Skin/Tissue Integrity  Goal: Absence of new skin breakdown  Description: 1.  Monitor for areas of redness and/or skin breakdown  2.  Assess vascular access sites hourly  3.  Every 4-6 hours minimum:  Change oxygen saturation probe site  4.  Every 4-6 hours:  If on nasal continuous positive airway pressure, respiratory therapy assess nares and determine need for appliance change or resting period.  11/5/2024 1323 by Christen Taveras RN  Outcome: Adequate for Discharge  11/5/2024 1322 by Christen Taveras RN  Outcome: Progressing  11/5/2024 0134 by Lulú Lane RN  Outcome: Progressing     Problem: Chronic Conditions and Co-morbidities  Goal: Patient's chronic conditions and co-morbidity symptoms are monitored and maintained or improved  11/5/2024 1323 by Christen Taveras RN  Outcome: Adequate for Discharge  11/5/2024 1322 by Christen Taveras RN  Outcome: Progressing  Flowsheets (Taken 11/5/2024 0601 by Floresita Mistry RN)  Care Plan - Patient's Chronic Conditions and Co-Morbidity Symptoms are Monitored and Maintained or Improved: Monitor and assess patient's chronic conditions and comorbid symptoms for stability, deterioration, or improvement  11/5/2024 0134 by Lulú Lane RN  Outcome: Progressing  Flowsheets (Taken 11/4/2024 2000)  Care Plan - Patient's Chronic Conditions and Co-Morbidity Symptoms are Monitored and Maintained or Improved:   Monitor and assess patient's chronic conditions and comorbid symptoms for stability, deterioration, or improvement   Collaborate with multidisciplinary team to address chronic and comorbid conditions and prevent exacerbation or deterioration   Update acute care plan with appropriate goals if chronic or comorbid symptoms are exacerbated and prevent overall improvement and discharge     Problem: Discharge Planning  Goal: Discharge to home or other facility with appropriate resources  11/5/2024 1323 by Christen Taveras RN  Outcome: Adequate for Discharge  11/5/2024 1322

## 2024-11-05 NOTE — CARE COORDINATION
Case Management Discharge Note          Date / Time of Note: 11/5/2024 12:52 PM                  Patient Name: Eric Ovalles   YOB: 1945  Diagnosis: Acute respiratory failure [J96.00]  COPD exacerbation (HCC) [J44.1]  Bilateral pleural effusion [J90]  Acute systolic congestive heart failure (HCC) [I50.21]  Acute respiratory failure with hypoxia and hypercapnia [J96.01, J96.02]   Date / Time: 11/2/2024  8:10 AM    Financial:  Payor: MEDICARE / Plan: MEDICARE PART A AND B / Product Type: *No Product type* /      Pharmacy:    [id number] - [place], [place] - [address] - P [phone] - F [phone]  [address]  [facility] [address]  Phone: [phone] Fax: [phone]      Assistance purchasing medications?: Potential Assistance Purchasing Medications: No  Assistance provided by Case Management: None at this time    DISCHARGE Disposition: Home with Home Health Care    Home Care:  Home Care ordered at discharge: Yes  Home Care Agency:  [facility]  Phone: [phone]  Fax: n/a  Orders faxed: No agency can pull orders via EPIC    Transportation:  Transportation PLAN for discharge: family   Mode of Transport: Private Car  Time of Transport: after 2pm    Transport form completed: Not Indicated    IMM Completed:   Yes, Case management has presented and reviewed IMM letter #2.       IMM Letter date given:: 11/05/24  IMM Letter time given:: 1231.   Patient and/or family/POA verbalized understanding of their medicare rights and appeal process if needed. Patient and/or family/POA has signed, initialed and placed the date and time on IMM letter #2 on the the appropriate lines. Copy of letter offered and they are aware that the original copy of IMM letter #2 is available prior to discharge from the paper chart on the unit.  Electronic documentation has been entered into epic for IMM letter #2 and original paper copy has been added to the paper chart at the

## 2024-11-05 NOTE — PROGRESS NOTES
NAME:  Eric Ovalles  YOB: 1945  MEDICAL RECORD NUMBER:  [id number]    Shift Summary: Pt AAOx4 and follows commands. Pt straight cathed twice overnight per PRN orders. Pt transferred to room 5253 overnight.     Family updated: Yes:  Pt's wife, Juliana, updated via phone and notified of transfer to new room    Rhythm: Normal Sinus Rhythm     Most recent vitals:   Visit Vitals  BP (!) 114/53   Pulse 62   Temp 97.9 °F (36.6 °C) (Oral)   Resp 16   Ht 1.854 m (6' 1\")   Wt 58 kg (127 lb 13.9 oz)   SpO2 96%   BMI 16.87 kg/m²           No data found.    No data found.      Respiratory support needed (if any):  - RA    Admission weight Weight - Scale: 66 kg (145 lb 8.1 oz) (11/02/24 0823)    Today's weight    Wt Readings from Last 1 Encounters:   11/05/24 58 kg (127 lb 13.9 oz)        Bledsoe need assessed each shift: N/A - no bledsoe present  UOP >30ml/hr: YES  Last documented BM (in last 48 hrs):  Patient Vitals for the past 48 hrs:   Last BM (including prior to admit) Stool Occurrence   11/03/24 0830 11/03/24 1   11/03/24 1545 11/03/24 1   11/04/24 1300 -- 1   11/04/24 1800 11/04/24 --                Restraints (in use currently or dc'd in last 12 hrs): No    Order current and documentation up to date? N/A    Lines/Drains reviewed @ bedside.  Peripheral IV 11/02/24 Left;Posterior Wrist (Active)   Number of days: 2       Peripheral IV 11/03/24 Left;Posterior Forearm (Active)   Number of days: 2         Drip rates at handoff:    sodium chloride         Lab Data:   CBC:   Recent Labs     11/03/24  0509 11/04/24  0426   WBC 9.4 9.1   HGB 11.6* 11.2*   HCT 33.9* 32.5*   MCV 94.0 93.0    216     BMP:    Recent Labs     11/03/24  0509 11/04/24  0426    138   K 4.1 3.9   CO2 25 29   BUN 24* 29*   CREATININE 0.8 0.9     LIVR:   Recent Labs     11/02/24  0834   AST 15   ALT 13     PT/INR: No results for input(s): \"INR\" in the last 72 hours.    Invalid input(s): \"PROT\"  APTT: No results for input(s):  \"APTT\" in the last 72 hours.  ABG: No results for input(s): \"PHART\", \"PHP4HVN\", \"PO2ART\" in the last 72 hours.    Any consults during the shift? No    Any signed and held orders to be released?  No        4 Eyes Skin Assessment       The patient is being assessed for  Transfer to New Unit    I agree that at least one RN has performed a thorough Head to Toe Skin Assessment on the patient. ALL assessment sites listed below have been assessed.      Areas assessed by both nurses: Head, Face, Ears, Shoulders, Back, Chest, Arms, Elbows, Hands, Sacrum. Buttock, Coccyx, Ischium, Legs. Feet and Heels, and Under Medical Devices         Does the Patient have a Wound? No noted wound(s)    Wound Care Orders initiated by RN: No       Jeremi Prevention initiated by RN: Yes    Pressure Injury (Stage 3,4, Unstageable, DTI, NWPT, and Complex wounds) if present, place Wound referral order by RN under : No    New Ostomies, if present place, Ostomy referral order under : No     Nurse 1 eSignature: Electronically signed by Lulú Lane RN on 11/5/24 at 5:30 AM EST    **SHARE this note so that the co-signing nurse can place an eSignature**    Nurse 2 eSignature: Electronically signed by Floresita Mistry RN on 11/5/24 at 5:53 AM EST

## 2024-11-05 NOTE — PROGRESS NOTES
Pt arrived to floor via stretcher from ICU and transferred to bed. Telemetry activated. Patient oriented to room and use of call light. Call light and personal items within reach. Admission and assessment initiated. POC and education initiated and reviewed with patient Denied further needs or questions at this time. VSS. Plan of care on going.

## 2024-11-05 NOTE — PLAN OF CARE
Problem: Skin/Tissue Integrity  Goal: Absence of new skin breakdown  Description: 1.  Monitor for areas of redness and/or skin breakdown  2.  Assess vascular access sites hourly  3.  Every 4-6 hours minimum:  Change oxygen saturation probe site  4.  Every 4-6 hours:  If on nasal continuous positive airway pressure, respiratory therapy assess nares and determine need for appliance change or resting period.  11/5/2024 1322 by Christen Taveras RN  Outcome: Progressing  11/5/2024 0134 by Lulú Lane RN  Outcome: Progressing     Problem: Chronic Conditions and Co-morbidities  Goal: Patient's chronic conditions and co-morbidity symptoms are monitored and maintained or improved  11/5/2024 1322 by Christen Taveras RN  Outcome: Progressing  Flowsheets (Taken 11/5/2024 0601 by Floresita Mistry RN)  Care Plan - Patient's Chronic Conditions and Co-Morbidity Symptoms are Monitored and Maintained or Improved: Monitor and assess patient's chronic conditions and comorbid symptoms for stability, deterioration, or improvement  11/5/2024 0134 by Lulú Lane RN  Outcome: Progressing  Flowsheets (Taken 11/4/2024 2000)  Care Plan - Patient's Chronic Conditions and Co-Morbidity Symptoms are Monitored and Maintained or Improved:   Monitor and assess patient's chronic conditions and comorbid symptoms for stability, deterioration, or improvement   Collaborate with multidisciplinary team to address chronic and comorbid conditions and prevent exacerbation or deterioration   Update acute care plan with appropriate goals if chronic or comorbid symptoms are exacerbated and prevent overall improvement and discharge     Problem: Discharge Planning  Goal: Discharge to home or other facility with appropriate resources  11/5/2024 1322 by Christen Taveras RN  Outcome: Progressing  Flowsheets (Taken 11/5/2024 0601 by Floresita Mistry RN)  Discharge to home or other facility with appropriate resources: Identify barriers to discharge with patient and  ventilation and oxygenation: Assess for changes in respiratory status  11/5/2024 0134 by Lulú Lane, RN  Outcome: Progressing  Flowsheets (Taken 11/4/2024 2000)  Achieves optimal ventilation and oxygenation:   Assess for changes in respiratory status   Assess for changes in mentation and behavior   Position to facilitate oxygenation and minimize respiratory effort   Oxygen supplementation based on oxygen saturation or arterial blood gases   Encourage broncho-pulmonary hygiene including cough, deep breathe, incentive spirometry   Assess the need for suctioning and aspirate as needed   Assess and instruct to report shortness of breath or any respiratory difficulty   Respiratory therapy support as indicated     Problem: Skin/Tissue Integrity - Adult  Goal: Skin integrity remains intact  Outcome: Progressing  Flowsheets (Taken 11/5/2024 0601 by Floresita Mistry RN)  Skin Integrity Remains Intact: Monitor for areas of redness and/or skin breakdown  Goal: Oral mucous membranes remain intact  Outcome: Progressing     Problem: Musculoskeletal - Adult  Goal: Return mobility to safest level of function  Outcome: Progressing  Goal: Return ADL status to a safe level of function  Outcome: Progressing     Problem: Gastrointestinal - Adult  Goal: Minimal or absence of nausea and vomiting  Outcome: Progressing  Goal: Maintains or returns to baseline bowel function  Outcome: Progressing  Goal: Maintains adequate nutritional intake  Outcome: Progressing     Problem: Genitourinary - Adult  Goal: Absence of urinary retention  Outcome: Progressing     Problem: Metabolic/Fluid and Electrolytes - Adult  Goal: Electrolytes maintained within normal limits  Outcome: Progressing  Goal: Hemodynamic stability and optimal renal function maintained  Outcome: Progressing  Goal: Glucose maintained within prescribed range  Outcome: Progressing

## 2024-11-05 NOTE — PROGRESS NOTES
Patient transported via bed with this RN to room 5253. Floresita RN at bedside. All questions answered at this time.
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
## 2024-11-05 NOTE — NURSE NAVIGATOR
Discharge order noted. He will be returning home with [facility] home care. That will serve as his initial follow up assessment- RN to see by 11/8/24  He has a PCP appointment scheduled with  Dr. Grider/ PCP 11/21/24@1130 am.  He already had an appointment scheduled with [facility] at [facility]/ [facility] 1/24/25 @ 1130am   All GDMT has been addressed   Dc wt 131 lbs bed scale.

## 2024-11-05 NOTE — DISCHARGE SUMMARY
V2.0  Discharge Summary    Name:  Eric Ovalles /Age/Sex: 1945 (79 y.o. male)   Admit Date: 2024  Discharge Date: 24    MRN & CSN:  [id number] & [id number] Encounter Date and Time 24 12:14 PM EST    Attending:  Samuel Mcleod DO Discharging Provider: Samuel Mcleod DO       Hospital Course:     Brief HPI: Eric Ovalles is a 79 y.o. male with a pertinent PMHx of CHF, CAD, emphysema, PE, hemorrhagic CVA, renal cell carcinoma who presented with shortness of breath.  CT chest showed bilateral pleural effusions, emphysema, and bronchitis changes.  He was admitted for acute on chronic respiratory failure with hypoxia and hypercapnia and was started on BiPAP.  He received systemic steroids and is to complete a 5-day course. His empiric antibiotics for CAP were transitioned to oral Augmentin to complete course, unfortunately pneumonia panel was never able to be collected.  He was able to wean down to room air.  Chest x-ray did show improvement in pleural effusions as well as airspace opacities.    Cardiology was consulted for his acute on chronic heart failure.  His heart failure diagnosis is relatively recent.  He was diuresed successfully and transition to oral torsemide.  He was continued on metoprolol, valsartan, as well as Jardiance and eplerenone.    Brief Problem Based Course:     Acute on chronic systolic heart failure  Acute hypoxic respiratory failure  -Chest x-ray showed improvement in bilateral opacities as well as pleural effusions  -Successfully weaned down to room air  -Diuresed with IV Lasix, transition to oral torsemide  -Empiric antibiotics transition to oral Augmentin to complete 7-day course  -Systemic steroids to complete 5-day course  -Metoprolol, losartan, Jardiance, eplerenone      The patient expressed appropriate understanding of, and agreement with the discharge recommendations, medications, and plan.     Consults this admission:  IP CONSULT TO HOSPITALIST  MG

## 2024-11-05 NOTE — PROGRESS NOTES
CLINICAL PHARMACY NOTE: MEDS TO BEDS    Total # of Prescriptions Filled: 1   The following medications were delivered to the patient:  Current Discharge Medication List        START taking these medications    Details   predniSONE (DELTASONE) 20 MG tablet Take 2 tablets by mouth daily for 2 doses  Qty: 4 tablet, Refills: 0      torsemide (DEMADEX) 20 MG tablet Take 1 tablet by mouth daily  Qty: 30 tablet, Refills: 3      amoxicillin-clavulanate (AUGMENTIN) 875-125 MG per tablet Take 1 tablet by mouth 2 times daily for 2 days  Qty: 4 tablet, Refills: 0               Additional Documentation:  Antibiotic delivered at bedside other meds transferred to home pharmacy not contracted with ins

## 2024-11-06 LAB
BACTERIA BLD CULT ORG #2: NORMAL
BACTERIA BLD CULT: NORMAL